# Patient Record
Sex: MALE | Race: WHITE | NOT HISPANIC OR LATINO | Employment: UNEMPLOYED | URBAN - METROPOLITAN AREA
[De-identification: names, ages, dates, MRNs, and addresses within clinical notes are randomized per-mention and may not be internally consistent; named-entity substitution may affect disease eponyms.]

---

## 2020-12-16 ENCOUNTER — APPOINTMENT (OUTPATIENT)
Dept: CT IMAGING | Facility: CLINIC | Age: 60
End: 2020-12-16
Attending: NURSE PRACTITIONER
Payer: COMMERCIAL

## 2020-12-16 ENCOUNTER — APPOINTMENT (OUTPATIENT)
Dept: ULTRASOUND IMAGING | Facility: CLINIC | Age: 60
End: 2020-12-16
Attending: NURSE PRACTITIONER
Payer: COMMERCIAL

## 2020-12-16 ENCOUNTER — HOSPITAL ENCOUNTER (OUTPATIENT)
Facility: CLINIC | Age: 60
Setting detail: OBSERVATION
Discharge: HOME | End: 2020-12-18
Attending: NURSE PRACTITIONER | Admitting: INTERNAL MEDICINE
Payer: COMMERCIAL

## 2020-12-16 DIAGNOSIS — Z20.828 EXPOSURE TO SARS-ASSOCIATED CORONAVIRUS: ICD-10-CM

## 2020-12-16 DIAGNOSIS — G89.18 POSTOPERATIVE PAIN: Primary | ICD-10-CM

## 2020-12-16 DIAGNOSIS — K81.0 ACUTE CHOLECYSTITIS: ICD-10-CM

## 2020-12-16 LAB
ALBUMIN SERPL-MCNC: 3.6 G/DL (ref 3.4–5)
ALBUMIN UR-MCNC: NEGATIVE MG/DL
ALP SERPL-CCNC: 83 U/L (ref 40–150)
ALT SERPL W P-5'-P-CCNC: 21 U/L (ref 0–70)
ANION GAP SERPL CALCULATED.3IONS-SCNC: 3 MMOL/L (ref 3–14)
APPEARANCE UR: CLEAR
AST SERPL W P-5'-P-CCNC: 15 U/L (ref 0–45)
BASOPHILS # BLD AUTO: 0.1 10E9/L (ref 0–0.2)
BASOPHILS NFR BLD AUTO: 0.6 %
BILIRUB SERPL-MCNC: 0.6 MG/DL (ref 0.2–1.3)
BILIRUB UR QL STRIP: NEGATIVE
BUN SERPL-MCNC: 10 MG/DL (ref 7–30)
CALCIUM SERPL-MCNC: 8.7 MG/DL (ref 8.5–10.1)
CHLORIDE SERPL-SCNC: 105 MMOL/L (ref 94–109)
CO2 SERPL-SCNC: 27 MMOL/L (ref 20–32)
COLOR UR AUTO: YELLOW
CREAT SERPL-MCNC: 0.96 MG/DL (ref 0.66–1.25)
DIFFERENTIAL METHOD BLD: ABNORMAL
EOSINOPHIL # BLD AUTO: 0.1 10E9/L (ref 0–0.7)
EOSINOPHIL NFR BLD AUTO: 0.9 %
ERYTHROCYTE [DISTWIDTH] IN BLOOD BY AUTOMATED COUNT: 11.7 % (ref 10–15)
GFR SERPL CREATININE-BSD FRML MDRD: 86 ML/MIN/{1.73_M2}
GLUCOSE SERPL-MCNC: 104 MG/DL (ref 70–99)
GLUCOSE UR STRIP-MCNC: NEGATIVE MG/DL
HCT VFR BLD AUTO: 48 % (ref 40–53)
HGB BLD-MCNC: 16.3 G/DL (ref 13.3–17.7)
HGB UR QL STRIP: ABNORMAL
IMM GRANULOCYTES # BLD: 0.1 10E9/L (ref 0–0.4)
IMM GRANULOCYTES NFR BLD: 0.4 %
KETONES UR STRIP-MCNC: NEGATIVE MG/DL
LEUKOCYTE ESTERASE UR QL STRIP: NEGATIVE
LIPASE SERPL-CCNC: 53 U/L (ref 73–393)
LYMPHOCYTES # BLD AUTO: 2 10E9/L (ref 0.8–5.3)
LYMPHOCYTES NFR BLD AUTO: 14.3 %
MCH RBC QN AUTO: 31.9 PG (ref 26.5–33)
MCHC RBC AUTO-ENTMCNC: 34 G/DL (ref 31.5–36.5)
MCV RBC AUTO: 94 FL (ref 78–100)
MONOCYTES # BLD AUTO: 1.3 10E9/L (ref 0–1.3)
MONOCYTES NFR BLD AUTO: 9.1 %
MUCOUS THREADS #/AREA URNS LPF: PRESENT /LPF
NEUTROPHILS # BLD AUTO: 10.4 10E9/L (ref 1.6–8.3)
NEUTROPHILS NFR BLD AUTO: 74.7 %
NITRATE UR QL: NEGATIVE
NRBC # BLD AUTO: 0 10*3/UL
NRBC BLD AUTO-RTO: 0 /100
PH UR STRIP: 5 PH (ref 5–7)
PLATELET # BLD AUTO: 200 10E9/L (ref 150–450)
POTASSIUM SERPL-SCNC: 3.5 MMOL/L (ref 3.4–5.3)
PROT SERPL-MCNC: 7.5 G/DL (ref 6.8–8.8)
RBC # BLD AUTO: 5.11 10E12/L (ref 4.4–5.9)
RBC #/AREA URNS AUTO: 1 /HPF (ref 0–2)
SODIUM SERPL-SCNC: 135 MMOL/L (ref 133–144)
SOURCE: ABNORMAL
SP GR UR STRIP: 1.01 (ref 1–1.03)
TROPONIN I SERPL-MCNC: <0.015 UG/L (ref 0–0.04)
UROBILINOGEN UR STRIP-MCNC: 0 MG/DL (ref 0–2)
WBC # BLD AUTO: 13.9 10E9/L (ref 4–11)
WBC #/AREA URNS AUTO: 2 /HPF (ref 0–5)

## 2020-12-16 PROCEDURE — 74177 CT ABD & PELVIS W/CONTRAST: CPT

## 2020-12-16 PROCEDURE — 83690 ASSAY OF LIPASE: CPT | Performed by: NURSE PRACTITIONER

## 2020-12-16 PROCEDURE — 85025 COMPLETE CBC W/AUTO DIFF WBC: CPT | Performed by: NURSE PRACTITIONER

## 2020-12-16 PROCEDURE — 96376 TX/PRO/DX INJ SAME DRUG ADON: CPT | Performed by: EMERGENCY MEDICINE

## 2020-12-16 PROCEDURE — U0003 INFECTIOUS AGENT DETECTION BY NUCLEIC ACID (DNA OR RNA); SEVERE ACUTE RESPIRATORY SYNDROME CORONAVIRUS 2 (SARS-COV-2) (CORONAVIRUS DISEASE [COVID-19]), AMPLIFIED PROBE TECHNIQUE, MAKING USE OF HIGH THROUGHPUT TECHNOLOGIES AS DESCRIBED BY CMS-2020-01-R: HCPCS | Performed by: NURSE PRACTITIONER

## 2020-12-16 PROCEDURE — 99285 EMERGENCY DEPT VISIT HI MDM: CPT | Mod: 25 | Performed by: EMERGENCY MEDICINE

## 2020-12-16 PROCEDURE — 84484 ASSAY OF TROPONIN QUANT: CPT | Performed by: NURSE PRACTITIONER

## 2020-12-16 PROCEDURE — 76705 ECHO EXAM OF ABDOMEN: CPT

## 2020-12-16 PROCEDURE — 250N000013 HC RX MED GY IP 250 OP 250 PS 637: Performed by: INTERNAL MEDICINE

## 2020-12-16 PROCEDURE — 250N000011 HC RX IP 250 OP 636: Performed by: NURSE PRACTITIONER

## 2020-12-16 PROCEDURE — G0378 HOSPITAL OBSERVATION PER HR: HCPCS

## 2020-12-16 PROCEDURE — 99219 PR INITIAL OBSERVATION CARE,LEVEL II: CPT | Performed by: INTERNAL MEDICINE

## 2020-12-16 PROCEDURE — 87086 URINE CULTURE/COLONY COUNT: CPT | Performed by: NURSE PRACTITIONER

## 2020-12-16 PROCEDURE — 96365 THER/PROPH/DIAG IV INF INIT: CPT | Mod: 59 | Performed by: EMERGENCY MEDICINE

## 2020-12-16 PROCEDURE — 93010 ELECTROCARDIOGRAM REPORT: CPT | Performed by: EMERGENCY MEDICINE

## 2020-12-16 PROCEDURE — 93005 ELECTROCARDIOGRAM TRACING: CPT | Performed by: EMERGENCY MEDICINE

## 2020-12-16 PROCEDURE — 81001 URINALYSIS AUTO W/SCOPE: CPT | Performed by: NURSE PRACTITIONER

## 2020-12-16 PROCEDURE — 96376 TX/PRO/DX INJ SAME DRUG ADON: CPT

## 2020-12-16 PROCEDURE — 80053 COMPREHEN METABOLIC PANEL: CPT | Performed by: NURSE PRACTITIONER

## 2020-12-16 PROCEDURE — 258N000003 HC RX IP 258 OP 636: Performed by: NURSE PRACTITIONER

## 2020-12-16 PROCEDURE — 258N000003 HC RX IP 258 OP 636: Performed by: INTERNAL MEDICINE

## 2020-12-16 PROCEDURE — 96375 TX/PRO/DX INJ NEW DRUG ADDON: CPT | Performed by: EMERGENCY MEDICINE

## 2020-12-16 PROCEDURE — C9803 HOPD COVID-19 SPEC COLLECT: HCPCS | Performed by: EMERGENCY MEDICINE

## 2020-12-16 PROCEDURE — 250N000011 HC RX IP 250 OP 636: Performed by: INTERNAL MEDICINE

## 2020-12-16 RX ORDER — PROCHLORPERAZINE 25 MG
25 SUPPOSITORY, RECTAL RECTAL EVERY 12 HOURS PRN
Status: DISCONTINUED | OUTPATIENT
Start: 2020-12-16 | End: 2020-12-18 | Stop reason: HOSPADM

## 2020-12-16 RX ORDER — SODIUM CHLORIDE 9 MG/ML
INJECTION, SOLUTION INTRAVENOUS CONTINUOUS
Status: DISCONTINUED | OUTPATIENT
Start: 2020-12-16 | End: 2020-12-18 | Stop reason: HOSPADM

## 2020-12-16 RX ORDER — SODIUM CHLORIDE, SODIUM LACTATE, POTASSIUM CHLORIDE, CALCIUM CHLORIDE 600; 310; 30; 20 MG/100ML; MG/100ML; MG/100ML; MG/100ML
INJECTION, SOLUTION INTRAVENOUS CONTINUOUS
Status: DISCONTINUED | OUTPATIENT
Start: 2020-12-16 | End: 2020-12-18 | Stop reason: HOSPADM

## 2020-12-16 RX ORDER — POLYETHYLENE GLYCOL 3350 17 G/17G
17 POWDER, FOR SOLUTION ORAL DAILY
Status: DISCONTINUED | OUTPATIENT
Start: 2020-12-17 | End: 2020-12-18 | Stop reason: HOSPADM

## 2020-12-16 RX ORDER — HYDROCODONE BITARTRATE AND ACETAMINOPHEN 5; 325 MG/1; MG/1
1-2 TABLET ORAL EVERY 4 HOURS PRN
Status: DISCONTINUED | OUTPATIENT
Start: 2020-12-16 | End: 2020-12-18 | Stop reason: HOSPADM

## 2020-12-16 RX ORDER — ONDANSETRON 4 MG/1
4 TABLET, ORALLY DISINTEGRATING ORAL EVERY 6 HOURS PRN
Status: DISCONTINUED | OUTPATIENT
Start: 2020-12-16 | End: 2020-12-16

## 2020-12-16 RX ORDER — HYDROMORPHONE HYDROCHLORIDE 1 MG/ML
0.2 INJECTION, SOLUTION INTRAMUSCULAR; INTRAVENOUS; SUBCUTANEOUS
Status: DISCONTINUED | OUTPATIENT
Start: 2020-12-16 | End: 2020-12-18 | Stop reason: HOSPADM

## 2020-12-16 RX ORDER — AMOXICILLIN 250 MG
2 CAPSULE ORAL 2 TIMES DAILY
Status: DISCONTINUED | OUTPATIENT
Start: 2020-12-16 | End: 2020-12-18 | Stop reason: HOSPADM

## 2020-12-16 RX ORDER — ONDANSETRON 2 MG/ML
4 INJECTION INTRAMUSCULAR; INTRAVENOUS EVERY 6 HOURS PRN
Status: DISCONTINUED | OUTPATIENT
Start: 2020-12-16 | End: 2020-12-16

## 2020-12-16 RX ORDER — LORAZEPAM 0.5 MG/1
0.5 TABLET ORAL EVERY 4 HOURS PRN
Status: DISCONTINUED | OUTPATIENT
Start: 2020-12-16 | End: 2020-12-18 | Stop reason: HOSPADM

## 2020-12-16 RX ORDER — ACETAMINOPHEN 325 MG/1
650 TABLET ORAL EVERY 4 HOURS PRN
Status: DISCONTINUED | OUTPATIENT
Start: 2020-12-16 | End: 2020-12-18 | Stop reason: HOSPADM

## 2020-12-16 RX ORDER — ONDANSETRON 4 MG/1
4 TABLET, ORALLY DISINTEGRATING ORAL EVERY 6 HOURS PRN
Status: DISCONTINUED | OUTPATIENT
Start: 2020-12-16 | End: 2020-12-18 | Stop reason: HOSPADM

## 2020-12-16 RX ORDER — HYDROMORPHONE HYDROCHLORIDE 1 MG/ML
0.5 INJECTION, SOLUTION INTRAMUSCULAR; INTRAVENOUS; SUBCUTANEOUS
Status: COMPLETED | OUTPATIENT
Start: 2020-12-16 | End: 2020-12-16

## 2020-12-16 RX ORDER — PROCHLORPERAZINE MALEATE 5 MG
10 TABLET ORAL EVERY 6 HOURS PRN
Status: DISCONTINUED | OUTPATIENT
Start: 2020-12-16 | End: 2020-12-18 | Stop reason: HOSPADM

## 2020-12-16 RX ORDER — NALOXONE HYDROCHLORIDE 0.4 MG/ML
0.4 INJECTION, SOLUTION INTRAMUSCULAR; INTRAVENOUS; SUBCUTANEOUS
Status: DISCONTINUED | OUTPATIENT
Start: 2020-12-16 | End: 2020-12-18 | Stop reason: HOSPADM

## 2020-12-16 RX ORDER — IOPAMIDOL 755 MG/ML
79 INJECTION, SOLUTION INTRAVASCULAR ONCE
Status: COMPLETED | OUTPATIENT
Start: 2020-12-16 | End: 2020-12-16

## 2020-12-16 RX ORDER — NALOXONE HYDROCHLORIDE 0.4 MG/ML
0.2 INJECTION, SOLUTION INTRAMUSCULAR; INTRAVENOUS; SUBCUTANEOUS
Status: DISCONTINUED | OUTPATIENT
Start: 2020-12-16 | End: 2020-12-18 | Stop reason: HOSPADM

## 2020-12-16 RX ORDER — ONDANSETRON 2 MG/ML
4 INJECTION INTRAMUSCULAR; INTRAVENOUS EVERY 6 HOURS PRN
Status: DISCONTINUED | OUTPATIENT
Start: 2020-12-16 | End: 2020-12-18 | Stop reason: HOSPADM

## 2020-12-16 RX ORDER — KETOROLAC TROMETHAMINE 15 MG/ML
15 INJECTION, SOLUTION INTRAMUSCULAR; INTRAVENOUS ONCE
Status: COMPLETED | OUTPATIENT
Start: 2020-12-16 | End: 2020-12-16

## 2020-12-16 RX ORDER — ACETAMINOPHEN 650 MG/1
650 SUPPOSITORY RECTAL EVERY 4 HOURS PRN
Status: DISCONTINUED | OUTPATIENT
Start: 2020-12-16 | End: 2020-12-18 | Stop reason: HOSPADM

## 2020-12-16 RX ORDER — ONDANSETRON 2 MG/ML
4 INJECTION INTRAMUSCULAR; INTRAVENOUS EVERY 30 MIN PRN
Status: DISCONTINUED | OUTPATIENT
Start: 2020-12-16 | End: 2020-12-16

## 2020-12-16 RX ORDER — AMOXICILLIN 250 MG
1 CAPSULE ORAL 2 TIMES DAILY
Status: DISCONTINUED | OUTPATIENT
Start: 2020-12-16 | End: 2020-12-18 | Stop reason: HOSPADM

## 2020-12-16 RX ORDER — LORAZEPAM 2 MG/ML
0.5 INJECTION INTRAMUSCULAR EVERY 4 HOURS PRN
Status: DISCONTINUED | OUTPATIENT
Start: 2020-12-16 | End: 2020-12-18 | Stop reason: HOSPADM

## 2020-12-16 RX ADMIN — IOPAMIDOL 79 ML: 755 INJECTION, SOLUTION INTRAVENOUS at 16:39

## 2020-12-16 RX ADMIN — HYDROMORPHONE HYDROCHLORIDE 0.5 MG: 1 INJECTION, SOLUTION INTRAMUSCULAR; INTRAVENOUS; SUBCUTANEOUS at 17:34

## 2020-12-16 RX ADMIN — ONDANSETRON 4 MG: 2 INJECTION INTRAMUSCULAR; INTRAVENOUS at 14:43

## 2020-12-16 RX ADMIN — HYDROCODONE BITARTRATE AND ACETAMINOPHEN 1 TABLET: 5; 325 TABLET ORAL at 21:39

## 2020-12-16 RX ADMIN — KETOROLAC TROMETHAMINE 15 MG: 15 INJECTION, SOLUTION INTRAMUSCULAR; INTRAVENOUS at 14:45

## 2020-12-16 RX ADMIN — SODIUM CHLORIDE, POTASSIUM CHLORIDE, SODIUM LACTATE AND CALCIUM CHLORIDE 1000 ML: 600; 310; 30; 20 INJECTION, SOLUTION INTRAVENOUS at 20:55

## 2020-12-16 RX ADMIN — HYDROMORPHONE HYDROCHLORIDE 0.5 MG: 1 INJECTION, SOLUTION INTRAMUSCULAR; INTRAVENOUS; SUBCUTANEOUS at 19:47

## 2020-12-16 RX ADMIN — SODIUM CHLORIDE: 9 INJECTION, SOLUTION INTRAVENOUS at 21:41

## 2020-12-16 RX ADMIN — SODIUM CHLORIDE, POTASSIUM CHLORIDE, SODIUM LACTATE AND CALCIUM CHLORIDE 1000 ML: 600; 310; 30; 20 INJECTION, SOLUTION INTRAVENOUS at 18:00

## 2020-12-16 RX ADMIN — HYDROMORPHONE HYDROCHLORIDE 0.5 MG: 1 INJECTION, SOLUTION INTRAMUSCULAR; INTRAVENOUS; SUBCUTANEOUS at 16:59

## 2020-12-16 RX ADMIN — TAZOBACTAM SODIUM AND PIPERACILLIN SODIUM 3.38 G: 375; 3 INJECTION, SOLUTION INTRAVENOUS at 17:49

## 2020-12-16 RX ADMIN — TAZOBACTAM SODIUM AND PIPERACILLIN SODIUM 3.38 G: 375; 3 INJECTION, SOLUTION INTRAVENOUS at 23:19

## 2020-12-16 ASSESSMENT — MIFFLIN-ST. JEOR
SCORE: 1499.5
SCORE: 1492.58

## 2020-12-16 NOTE — ED PROVIDER NOTES
History     Chief Complaint   Patient presents with     Abdominal Pain     c/o mid abdominal pain that radiates to his back     HPI  Iam Villanueva is a 60 year old male with past with past medical history of tobacco use who presents to the emergency department with acute onset of right and left sided upper abdominal pain last evening at 11 PM with no previous history of similar pain.  Patient reports the abdominal pain started off as sharp and steady and intermittent and rated the pain a 6 out of 10.  Patient states as the night went on the pain has become constant and severe with his sharpness and rates the pain a 9 out of 10.    Patient states that he has one episode of vomiting last evening but otherwise specifically denies diaphoresis, fevers, aches, chills, dysuria, hematuria, bright red rectal bleeding, diarrhea, constipation chest pain, shortness of air, difficulty breathing, mental confusion, dizziness, lightheadedness.  Patient reports that he tried MiraLAX this morning at 11 AM as he had a mild sensation of rectal urgency with no results.  Patient denies history of ulcerative colitis, cholecystitis, liver disease, kidney disease, Crohn's disease, diverticulitis.  Patient reports his last colonoscopy was 3 years ago and remarkable for some polyps.  Patient reports occasional to rare alcohol use and denies recreational drug use.    Patient denies ear pain, eye pain, throat pain, speech difficulty, left or right-sided body weakness, mental confusion, thoughts of harming self.    Allergies:  No Known Allergies    Problem List:    Patient Active Problem List    Diagnosis Date Noted     Acute cholecystitis 12/16/2020     Priority: Medium        Past Medical History:    No past medical history on file.    Past Surgical History:    No past surgical history on file.    Family History:    No family history on file.    Social History:  Marital Status:   [2]  Social History     Tobacco Use     Smoking  "status: Not on file   Substance Use Topics     Alcohol use: Not on file     Drug use: Not on file        Medications:    No current outpatient medications on file.      Review of Systems  As mentioned above in the history present illness. All other systems were reviewed and are negative.    Physical Exam   BP: (!) 157/85  Pulse: 75  Temp: 98.1  F (36.7  C)  Resp: 16  Height: 167.6 cm (5' 6\")  Weight: 73.5 kg (162 lb)  SpO2: 98 %      Physical Exam  Vitals signs and nursing note reviewed.   Constitutional:       General: He is in acute distress (mild distress).      Appearance: Normal appearance. He is well-developed and normal weight. He is not ill-appearing, toxic-appearing or diaphoretic.   HENT:      Head: Normocephalic and atraumatic.      Right Ear: Tympanic membrane, ear canal and external ear normal.      Left Ear: Tympanic membrane, ear canal and external ear normal.      Nose: Nose normal.      Mouth/Throat:      Pharynx: Uvula midline.   Eyes:      General:         Right eye: No discharge.         Left eye: No discharge.      Conjunctiva/sclera: Conjunctivae normal.   Neck:      Thyroid: No thyromegaly.   Cardiovascular:      Rate and Rhythm: Normal rate and regular rhythm.      Heart sounds: Normal heart sounds. No murmur. No friction rub. No gallop.    Pulmonary:      Effort: Pulmonary effort is normal.      Breath sounds: Normal breath sounds. No stridor. No wheezing.   Abdominal:      General: Abdomen is flat. Bowel sounds are normal. There is no distension.      Palpations: Abdomen is soft. There is no mass.      Tenderness: There is abdominal tenderness in the periumbilical area. There is right CVA tenderness and guarding (RUQ). There is no left CVA tenderness or rebound (LUQ). Negative signs include psoas sign.   Lymphadenopathy:      Cervical: No cervical adenopathy.   Skin:     General: Skin is warm.      Capillary Refill: Capillary refill takes less than 2 seconds.      Findings: No rash. "   Neurological:      General: No focal deficit present.      Mental Status: He is alert and oriented to person, place, and time.   Psychiatric:         Mood and Affect: Mood normal.         Behavior: Behavior is cooperative.         ED Course     ED Course as of Dec 16 2053   Wed Dec 16, 2020   1532 Reassessed patient.  Reviewed white blood cell count, troponin, EKG, comprehensive metabolic panel.  Urinalysis obtained and discussed if urinalysis reveals blood I would recommend a CT without contrast but if it does not have blood would recommend a CT with contrast.  Patient verbalized understanding.  Explained the differentials.  Patient states his pain level is currently 5 out of 10.      1945 Phone call to surgeon, Dr. Doss, reviewed case with him and requested surgical consultation.  He agreed that gallbladder needs to be removed.  He request overnight admission with antibiotics and pain control with surgery in the morning.  Hospitalist paged        Procedures       EKG Interpretation:      EKG Number: 1  Interpreted by Gisela Gao, BRIAN CNP, Norberto Rosenberg MD  Symptoms at time of EKG: abdominal pain, mid upper abdomen   Rhythm: Normal sinus   Rate: Normal  Axis: Normal  Ectopy: None  Conduction: Normal and Left anterior fasciclar block  ST Segments/ T Waves: No ST-T wave changes, No acute ischemic changes and Poor R wave progression  Q Waves: None  Comparison to prior: No old EKG available    Clinical Impression: no acute changes and non-specific EKG      Results for orders placed or performed during the hospital encounter of 12/16/20 (from the past 24 hour(s))   CBC with platelets differential   Result Value Ref Range    WBC 13.9 (H) 4.0 - 11.0 10e9/L    RBC Count 5.11 4.4 - 5.9 10e12/L    Hemoglobin 16.3 13.3 - 17.7 g/dL    Hematocrit 48.0 40.0 - 53.0 %    MCV 94 78 - 100 fl    MCH 31.9 26.5 - 33.0 pg    MCHC 34.0 31.5 - 36.5 g/dL    RDW 11.7 10.0 - 15.0 %    Platelet Count 200 150 - 450 10e9/L     Diff Method Automated Method     % Neutrophils 74.7 %    % Lymphocytes 14.3 %    % Monocytes 9.1 %    % Eosinophils 0.9 %    % Basophils 0.6 %    % Immature Granulocytes 0.4 %    Nucleated RBCs 0 0 /100    Absolute Neutrophil 10.4 (H) 1.6 - 8.3 10e9/L    Absolute Lymphocytes 2.0 0.8 - 5.3 10e9/L    Absolute Monocytes 1.3 0.0 - 1.3 10e9/L    Absolute Eosinophils 0.1 0.0 - 0.7 10e9/L    Absolute Basophils 0.1 0.0 - 0.2 10e9/L    Abs Immature Granulocytes 0.1 0 - 0.4 10e9/L    Absolute Nucleated RBC 0.0    Comprehensive metabolic panel   Result Value Ref Range    Sodium 135 133 - 144 mmol/L    Potassium 3.5 3.4 - 5.3 mmol/L    Chloride 105 94 - 109 mmol/L    Carbon Dioxide 27 20 - 32 mmol/L    Anion Gap 3 3 - 14 mmol/L    Glucose 104 (H) 70 - 99 mg/dL    Urea Nitrogen 10 7 - 30 mg/dL    Creatinine 0.96 0.66 - 1.25 mg/dL    GFR Estimate 86 >60 mL/min/[1.73_m2]    GFR Estimate If Black >90 >60 mL/min/[1.73_m2]    Calcium 8.7 8.5 - 10.1 mg/dL    Bilirubin Total 0.6 0.2 - 1.3 mg/dL    Albumin 3.6 3.4 - 5.0 g/dL    Protein Total 7.5 6.8 - 8.8 g/dL    Alkaline Phosphatase 83 40 - 150 U/L    ALT 21 0 - 70 U/L    AST 15 0 - 45 U/L   Troponin I   Result Value Ref Range    Troponin I ES <0.015 0.000 - 0.045 ug/L   Lipase   Result Value Ref Range    Lipase 53 (L) 73 - 393 U/L   UA reflex to Microscopic   Result Value Ref Range    Color Urine Yellow     Appearance Urine Clear     Glucose Urine Negative NEG^Negative mg/dL    Bilirubin Urine Negative NEG^Negative    Ketones Urine Negative NEG^Negative mg/dL    Specific Gravity Urine 1.015 1.003 - 1.035    Blood Urine Small (A) NEG^Negative    pH Urine 5.0 5.0 - 7.0 pH    Protein Albumin Urine Negative NEG^Negative mg/dL    Urobilinogen mg/dL 0.0 0.0 - 2.0 mg/dL    Nitrite Urine Negative NEG^Negative    Leukocyte Esterase Urine Negative NEG^Negative    Source Midstream Urine     RBC Urine 1 0 - 2 /HPF    WBC Urine 2 0 - 5 /HPF    Mucous Urine Present (A) NEG^Negative /LPF   CT  Abdomen Pelvis w Contrast    Narrative    CT ABDOMEN AND PELVIS WITH CONTRAST 12/16/2020 4:48 PM    CLINICAL HISTORY: Abdominal pain.    TECHNIQUE: CT scan of the abdomen and pelvis was performed following  injection of IV contrast. Multiplanar reformats were obtained. Dose  reduction techniques were used.    CONTRAST: 79 mL Isovue 370.    COMPARISON: None.    FINDINGS:   LOWER CHEST: Mild scarring and/or atelectasis at both lung bases  posteriorly. The visualized lung bases are otherwise clear. Small  hiatal hernia.    HEPATOBILIARY: There are multiple gallstones within the gallbladder.  The gallbladder appears distended, and the gallbladder wall is likely  thickened. There is mild pericholecystic inflammatory stranding.  Findings are suspicious for acute cholecystitis. The liver is  unremarkable.    PANCREAS: Normal.    SPLEEN: Normal.    ADRENAL GLANDS: Normal.    KIDNEYS/BLADDER: Unremarkable. No hydronephrosis.    BOWEL: Colonic diverticulosis. No convincing evidence for  diverticulitis. No bowel obstruction. Unremarkable appendix.    PELVIC ORGANS: Mild prostatic enlargement and central calcification.    LYMPH NODES: No enlarged lymph nodes are identified in the abdomen or  pelvis.    VASCULATURE: Mild atherosclerotic aortoiliac calcification.    ADDITIONAL FINDINGS: None.    MUSCULOSKELETAL: Unremarkable.      Impression    IMPRESSION:   1.  Gallbladder findings are suspicious for acute cholecystitis.  Consider right upper quadrant ultrasound for further characterization.  2.  Colonic diverticulosis, without convincing evidence for  diverticulitis.    MANUEL TELLEZ MD   US Abdomen Limited    Narrative    US ABDOMEN LIMITED 12/16/2020 7:26 PM    CLINICAL HISTORY: Abdominal pain. Abnormal gallbladder on CT.    TECHNIQUE: Limited abdominal ultrasound.    COMPARISON: CT of the abdomen and pelvis performed 12/16/2020.    FINDINGS:    GALLBLADDER: Stones and sludge are noted within the gallbladder.  The  gallbladder wall is mildly thickened at 0.3 cm. No pericholecystic  fluid is identified. The sonographer reports a positive sonographic  Alonso's sign. Findings are suspicious for acute cholecystitis.    BILE DUCTS: There is no biliary dilatation. The common bile duct  measures 6 mm. The entire common duct could not be visualized due to  overlying bowel gas.    LIVER: Unremarkable. No evidence for fatty infiltration of the liver.  No focal hepatic masses.    RIGHT KIDNEY: Unremarkable. No hydronephrosis.    PANCREAS: The visualized portions of the pancreas are normal.    No ascites.      Impression    IMPRESSION: Findings are suspicious for acute cholecystitis.    MANUEL TELLEZ MD       Medications   ondansetron (ZOFRAN) injection 4 mg (4 mg Intravenous Given 12/16/20 1443)   sodium chloride 0.9 % bag 500mL for CT scan flush use (has no administration in time range)   piperacillin-tazobactam (ZOSYN) infusion 3.375 g (0 g Intravenous Not Given 12/16/20 2037)   lactated ringers BOLUS 1,000 mL (has no administration in time range)     Followed by   lactated ringers BOLUS 1,000 mL (has no administration in time range)     Followed by   lactated ringers infusion (has no administration in time range)   ketorolac (TORADOL) injection 15 mg (15 mg Intravenous Given 12/16/20 1445)   iopamidol (ISOVUE-370) solution 79 mL (79 mLs Intravenous Given 12/16/20 1639)   HYDROmorphone (PF) (DILAUDID) injection 0.5 mg (0.5 mg Intravenous Given 12/16/20 1947)   piperacillin-tazobactam (ZOSYN) infusion 3.375 g (0 g Intravenous Stopped 12/16/20 1918)       Assessments & Plan (with Medical Decision Making)  Iam Villanueva is a 60 year old male with past with past medical history of tobacco use who presents to the emergency department with acute onset of right and left sided upper abdominal pain last evening at 11 PM with no previous history of similar pain without fevers, diarrhea and no history of ulcerative colitis,  diverticulitis, previous abdominal surgeries, liver disease or renal disease.  Patient on exam appears severely uncomfortable, and has pain in his right upper quadrant and right CVA there is no rebound or guarding noted.  Differential to include ureterolithiasis, pyelonephritis, infected ureteral lithiasis, cholecystitis with or without common bile duct obstruction pancreatitis with common bile duct obstruction, STEMI, NSTEMI.  CBC completed reveals slightly elevated white blood cell count and comprehensive metabolic panel completed and is unremarkable.  Lipase obtained and is no sign of pancreatitis.  Urinalysis obtained and has small blood noted but otherwise unremarkable.  CT with contrast obtained and reveals suspicion for acute cholecystitis.  Ultrasound completed and confirms cholecystitis.  Intervally patient given Zofran 3.375 g.  Surgical consultation completed and Dr. Doss recommends observation admission plans to complete surgery tomorrow after patient has had a few doses of IV antibiotics.  Dr. Ren hospitalist agrees to observation admission.  Orders placed.  Collaborative physician in care of this patient is Dr. Fredy Lezama.     I have reviewed the nursing notes.    I have reviewed the findings, diagnosis, plan and need for follow up with the patient.    New Prescriptions    No medications on file       Final diagnoses:   Acute cholecystitis       12/16/2020   [facility] EMERGENCY DEPT     Gisela Gao, APRN CNP  12/16/20 2053

## 2020-12-16 NOTE — ED NOTES
Spoke with daughter: Pt doctors at [facility]. Decreased blood flow to abdominal aorta and iliac arteries.     Daughter Padmaja - [phone] waiting in the car, she has his cell phone.

## 2020-12-17 ENCOUNTER — APPOINTMENT (OUTPATIENT)
Dept: GENERAL RADIOLOGY | Facility: CLINIC | Age: 60
End: 2020-12-17
Attending: FAMILY MEDICINE
Payer: COMMERCIAL

## 2020-12-17 ENCOUNTER — ANESTHESIA (OUTPATIENT)
Dept: SURGERY | Facility: CLINIC | Age: 60
End: 2020-12-17
Payer: COMMERCIAL

## 2020-12-17 ENCOUNTER — ANESTHESIA EVENT (OUTPATIENT)
Dept: SURGERY | Facility: CLINIC | Age: 60
End: 2020-12-17
Payer: COMMERCIAL

## 2020-12-17 LAB
BACTERIA SPEC CULT: NO GROWTH
BASOPHILS # BLD AUTO: 0.1 10E9/L (ref 0–0.2)
BASOPHILS NFR BLD AUTO: 0.6 %
DIFFERENTIAL METHOD BLD: NORMAL
EOSINOPHIL # BLD AUTO: 0.1 10E9/L (ref 0–0.7)
EOSINOPHIL NFR BLD AUTO: 0.9 %
ERYTHROCYTE [DISTWIDTH] IN BLOOD BY AUTOMATED COUNT: 11.8 % (ref 10–15)
HCT VFR BLD AUTO: 45.1 % (ref 40–53)
HGB BLD-MCNC: 15.5 G/DL (ref 13.3–17.7)
IMM GRANULOCYTES # BLD: 0.1 10E9/L (ref 0–0.4)
IMM GRANULOCYTES NFR BLD: 0.8 %
LABORATORY COMMENT REPORT: NORMAL
LYMPHOCYTES # BLD AUTO: 1.1 10E9/L (ref 0.8–5.3)
LYMPHOCYTES NFR BLD AUTO: 10.8 %
Lab: NORMAL
MCH RBC QN AUTO: 32.3 PG (ref 26.5–33)
MCHC RBC AUTO-ENTMCNC: 34.4 G/DL (ref 31.5–36.5)
MCV RBC AUTO: 94 FL (ref 78–100)
MONOCYTES # BLD AUTO: 1.1 10E9/L (ref 0–1.3)
MONOCYTES NFR BLD AUTO: 10.5 %
NEUTROPHILS # BLD AUTO: 8.1 10E9/L (ref 1.6–8.3)
NEUTROPHILS NFR BLD AUTO: 76.4 %
NRBC # BLD AUTO: 0 10*3/UL
NRBC BLD AUTO-RTO: 0 /100
PLATELET # BLD AUTO: 171 10E9/L (ref 150–450)
RBC # BLD AUTO: 4.8 10E12/L (ref 4.4–5.9)
SARS-COV-2 RNA SPEC QL NAA+PROBE: NEGATIVE
SARS-COV-2 RNA SPEC QL NAA+PROBE: NORMAL
SPECIMEN SOURCE: NORMAL
WBC # BLD AUTO: 10.5 10E9/L (ref 4–11)

## 2020-12-17 PROCEDURE — 250N000013 HC RX MED GY IP 250 OP 250 PS 637: Performed by: INTERNAL MEDICINE

## 2020-12-17 PROCEDURE — 250N000011 HC RX IP 250 OP 636: Performed by: NURSE ANESTHETIST, CERTIFIED REGISTERED

## 2020-12-17 PROCEDURE — 271N000001 HC OR GENERAL SUPPLY NON-STERILE: Performed by: SURGERY

## 2020-12-17 PROCEDURE — 258N000003 HC RX IP 258 OP 636: Performed by: NURSE ANESTHETIST, CERTIFIED REGISTERED

## 2020-12-17 PROCEDURE — 272N000001 HC OR GENERAL SUPPLY STERILE: Performed by: SURGERY

## 2020-12-17 PROCEDURE — 250N000011 HC RX IP 250 OP 636: Performed by: INTERNAL MEDICINE

## 2020-12-17 PROCEDURE — 36415 COLL VENOUS BLD VENIPUNCTURE: CPT | Performed by: NURSE PRACTITIONER

## 2020-12-17 PROCEDURE — 370N000002 HC ANESTHESIA TECHNICAL FEE, EACH ADDTL 15 MIN: Performed by: SURGERY

## 2020-12-17 PROCEDURE — 250N000009 HC RX 250: Performed by: SURGERY

## 2020-12-17 PROCEDURE — 360N000020 HC SURGERY LEVEL 3 1ST 30 MIN: Performed by: SURGERY

## 2020-12-17 PROCEDURE — 47562 LAPAROSCOPIC CHOLECYSTECTOMY: CPT | Performed by: SURGERY

## 2020-12-17 PROCEDURE — 250N000011 HC RX IP 250 OP 636: Performed by: SURGERY

## 2020-12-17 PROCEDURE — 370N000001 HC ANESTHESIA TECHNICAL FEE, 1ST 30 MIN: Performed by: SURGERY

## 2020-12-17 PROCEDURE — 761N000001 HC RECOVERY PHASE 1 LEVEL 1 FIRST HR: Performed by: SURGERY

## 2020-12-17 PROCEDURE — 99226 PR SUBSEQUENT OBSERVATION CARE,LEVEL III: CPT | Performed by: FAMILY MEDICINE

## 2020-12-17 PROCEDURE — 47562 LAPAROSCOPIC CHOLECYSTECTOMY: CPT | Mod: AS | Performed by: PHYSICIAN ASSISTANT

## 2020-12-17 PROCEDURE — 258N000003 HC RX IP 258 OP 636: Performed by: SURGERY

## 2020-12-17 PROCEDURE — 96375 TX/PRO/DX INJ NEW DRUG ADDON: CPT

## 2020-12-17 PROCEDURE — 250N000003 HC SEVOFLURANE, EA 15 MIN: Performed by: SURGERY

## 2020-12-17 PROCEDURE — 258N000001 HC RX 258: Performed by: SURGERY

## 2020-12-17 PROCEDURE — 999N000136 HC STATISTIC PRE PROC ASSESS II: Performed by: SURGERY

## 2020-12-17 PROCEDURE — 360N000021 HC SURGERY LEVEL 3 EA 15 ADDTL MIN: Performed by: SURGERY

## 2020-12-17 PROCEDURE — 250N000011 HC RX IP 250 OP 636: Performed by: NURSE PRACTITIONER

## 2020-12-17 PROCEDURE — 272N000004 HC RX 272: Performed by: SURGERY

## 2020-12-17 PROCEDURE — 250N000009 HC RX 250: Performed by: NURSE ANESTHETIST, CERTIFIED REGISTERED

## 2020-12-17 PROCEDURE — 85025 COMPLETE CBC W/AUTO DIFF WBC: CPT | Performed by: NURSE PRACTITIONER

## 2020-12-17 PROCEDURE — 258N000003 HC RX IP 258 OP 636: Performed by: INTERNAL MEDICINE

## 2020-12-17 PROCEDURE — 71045 X-RAY EXAM CHEST 1 VIEW: CPT

## 2020-12-17 PROCEDURE — 88304 TISSUE EXAM BY PATHOLOGIST: CPT | Mod: TC | Performed by: SURGERY

## 2020-12-17 PROCEDURE — G0378 HOSPITAL OBSERVATION PER HR: HCPCS

## 2020-12-17 PROCEDURE — 88304 TISSUE EXAM BY PATHOLOGIST: CPT | Mod: 26 | Performed by: PATHOLOGY

## 2020-12-17 PROCEDURE — 99207 PR CDG-CODE CATEGORY CHANGED: CPT | Performed by: FAMILY MEDICINE

## 2020-12-17 PROCEDURE — 96376 TX/PRO/DX INJ SAME DRUG ADON: CPT

## 2020-12-17 RX ORDER — NALOXONE HYDROCHLORIDE 0.4 MG/ML
0.2 INJECTION, SOLUTION INTRAMUSCULAR; INTRAVENOUS; SUBCUTANEOUS
Status: ACTIVE | OUTPATIENT
Start: 2020-12-17 | End: 2020-12-18

## 2020-12-17 RX ORDER — MEPERIDINE HYDROCHLORIDE 25 MG/ML
12.5 INJECTION INTRAMUSCULAR; INTRAVENOUS; SUBCUTANEOUS EVERY 5 MIN PRN
Status: DISCONTINUED | OUTPATIENT
Start: 2020-12-17 | End: 2020-12-18 | Stop reason: HOSPADM

## 2020-12-17 RX ORDER — NALOXONE HYDROCHLORIDE 0.4 MG/ML
0.4 INJECTION, SOLUTION INTRAMUSCULAR; INTRAVENOUS; SUBCUTANEOUS
Status: ACTIVE | OUTPATIENT
Start: 2020-12-17 | End: 2020-12-18

## 2020-12-17 RX ORDER — GABAPENTIN 300 MG/1
300 CAPSULE ORAL ONCE
Status: DISCONTINUED | OUTPATIENT
Start: 2020-12-17 | End: 2020-12-17 | Stop reason: HOSPADM

## 2020-12-17 RX ORDER — KETOROLAC TROMETHAMINE 30 MG/ML
INJECTION, SOLUTION INTRAMUSCULAR; INTRAVENOUS PRN
Status: DISCONTINUED | OUTPATIENT
Start: 2020-12-17 | End: 2020-12-17

## 2020-12-17 RX ORDER — DIMENHYDRINATE 50 MG/ML
25 INJECTION, SOLUTION INTRAMUSCULAR; INTRAVENOUS
Status: DISCONTINUED | OUTPATIENT
Start: 2020-12-17 | End: 2020-12-18 | Stop reason: HOSPADM

## 2020-12-17 RX ORDER — ONDANSETRON 2 MG/ML
4 INJECTION INTRAMUSCULAR; INTRAVENOUS EVERY 30 MIN PRN
Status: DISCONTINUED | OUTPATIENT
Start: 2020-12-17 | End: 2020-12-18 | Stop reason: HOSPADM

## 2020-12-17 RX ORDER — SODIUM CHLORIDE, SODIUM LACTATE, POTASSIUM CHLORIDE, CALCIUM CHLORIDE 600; 310; 30; 20 MG/100ML; MG/100ML; MG/100ML; MG/100ML
INJECTION, SOLUTION INTRAVENOUS CONTINUOUS
Status: DISCONTINUED | OUTPATIENT
Start: 2020-12-17 | End: 2020-12-18 | Stop reason: HOSPADM

## 2020-12-17 RX ORDER — FENTANYL CITRATE 50 UG/ML
25-50 INJECTION, SOLUTION INTRAMUSCULAR; INTRAVENOUS EVERY 5 MIN PRN
Status: DISCONTINUED | OUTPATIENT
Start: 2020-12-17 | End: 2020-12-18 | Stop reason: HOSPADM

## 2020-12-17 RX ORDER — ACETAMINOPHEN 325 MG/1
975 TABLET ORAL ONCE
Status: DISCONTINUED | OUTPATIENT
Start: 2020-12-17 | End: 2020-12-17 | Stop reason: HOSPADM

## 2020-12-17 RX ORDER — ONDANSETRON 4 MG/1
4 TABLET, ORALLY DISINTEGRATING ORAL EVERY 30 MIN PRN
Status: DISCONTINUED | OUTPATIENT
Start: 2020-12-17 | End: 2020-12-18 | Stop reason: HOSPADM

## 2020-12-17 RX ORDER — LIDOCAINE HYDROCHLORIDE 10 MG/ML
INJECTION, SOLUTION INFILTRATION; PERINEURAL PRN
Status: DISCONTINUED | OUTPATIENT
Start: 2020-12-17 | End: 2020-12-17

## 2020-12-17 RX ORDER — BUPIVACAINE HYDROCHLORIDE 5 MG/ML
INJECTION, SOLUTION PERINEURAL PRN
Status: DISCONTINUED | OUTPATIENT
Start: 2020-12-17 | End: 2020-12-17 | Stop reason: HOSPADM

## 2020-12-17 RX ORDER — SODIUM CHLORIDE, SODIUM LACTATE, POTASSIUM CHLORIDE, CALCIUM CHLORIDE 600; 310; 30; 20 MG/100ML; MG/100ML; MG/100ML; MG/100ML
INJECTION, SOLUTION INTRAVENOUS CONTINUOUS
Status: DISCONTINUED | OUTPATIENT
Start: 2020-12-17 | End: 2020-12-17 | Stop reason: HOSPADM

## 2020-12-17 RX ORDER — HYDROMORPHONE HYDROCHLORIDE 1 MG/ML
.3-.5 INJECTION, SOLUTION INTRAMUSCULAR; INTRAVENOUS; SUBCUTANEOUS EVERY 5 MIN PRN
Status: DISCONTINUED | OUTPATIENT
Start: 2020-12-17 | End: 2020-12-18 | Stop reason: HOSPADM

## 2020-12-17 RX ORDER — DEXAMETHASONE SODIUM PHOSPHATE 4 MG/ML
INJECTION, SOLUTION INTRA-ARTICULAR; INTRALESIONAL; INTRAMUSCULAR; INTRAVENOUS; SOFT TISSUE PRN
Status: DISCONTINUED | OUTPATIENT
Start: 2020-12-17 | End: 2020-12-17

## 2020-12-17 RX ORDER — HYDROCODONE BITARTRATE AND ACETAMINOPHEN 5; 325 MG/1; MG/1
1-2 TABLET ORAL EVERY 4 HOURS PRN
Status: DISCONTINUED | OUTPATIENT
Start: 2020-12-17 | End: 2020-12-18 | Stop reason: HOSPADM

## 2020-12-17 RX ORDER — ONDANSETRON 4 MG/1
4 TABLET, ORALLY DISINTEGRATING ORAL EVERY 6 HOURS PRN
Status: DISCONTINUED | OUTPATIENT
Start: 2020-12-17 | End: 2020-12-18 | Stop reason: HOSPADM

## 2020-12-17 RX ORDER — METOCLOPRAMIDE HYDROCHLORIDE 5 MG/ML
10 INJECTION INTRAMUSCULAR; INTRAVENOUS EVERY 6 HOURS
Status: DISCONTINUED | OUTPATIENT
Start: 2020-12-17 | End: 2020-12-18 | Stop reason: HOSPADM

## 2020-12-17 RX ORDER — FENTANYL CITRATE 50 UG/ML
INJECTION, SOLUTION INTRAMUSCULAR; INTRAVENOUS PRN
Status: DISCONTINUED | OUTPATIENT
Start: 2020-12-17 | End: 2020-12-17

## 2020-12-17 RX ORDER — ONDANSETRON 2 MG/ML
4 INJECTION INTRAMUSCULAR; INTRAVENOUS EVERY 6 HOURS PRN
Status: DISCONTINUED | OUTPATIENT
Start: 2020-12-17 | End: 2020-12-18 | Stop reason: HOSPADM

## 2020-12-17 RX ORDER — PROPOFOL 10 MG/ML
INJECTION, EMULSION INTRAVENOUS PRN
Status: DISCONTINUED | OUTPATIENT
Start: 2020-12-17 | End: 2020-12-17

## 2020-12-17 RX ORDER — LIDOCAINE 40 MG/G
CREAM TOPICAL
Status: DISCONTINUED | OUTPATIENT
Start: 2020-12-17 | End: 2020-12-17 | Stop reason: HOSPADM

## 2020-12-17 RX ORDER — ONDANSETRON 2 MG/ML
INJECTION INTRAMUSCULAR; INTRAVENOUS PRN
Status: DISCONTINUED | OUTPATIENT
Start: 2020-12-17 | End: 2020-12-17

## 2020-12-17 RX ADMIN — HYDROMORPHONE HYDROCHLORIDE 0.2 MG: 1 INJECTION, SOLUTION INTRAMUSCULAR; INTRAVENOUS; SUBCUTANEOUS at 10:42

## 2020-12-17 RX ADMIN — TAZOBACTAM SODIUM AND PIPERACILLIN SODIUM 3.38 G: 375; 3 INJECTION, SOLUTION INTRAVENOUS at 21:08

## 2020-12-17 RX ADMIN — ROCURONIUM BROMIDE 10 MG: 10 INJECTION INTRAVENOUS at 13:07

## 2020-12-17 RX ADMIN — FENTANYL CITRATE 100 MCG: 50 INJECTION, SOLUTION INTRAMUSCULAR; INTRAVENOUS at 13:07

## 2020-12-17 RX ADMIN — METOCLOPRAMIDE HYDROCHLORIDE 10 MG: 5 INJECTION INTRAMUSCULAR; INTRAVENOUS at 22:34

## 2020-12-17 RX ADMIN — TAZOBACTAM SODIUM AND PIPERACILLIN SODIUM 3.38 G: 375; 3 INJECTION, SOLUTION INTRAVENOUS at 05:23

## 2020-12-17 RX ADMIN — HYDROCODONE BITARTRATE AND ACETAMINOPHEN 2 TABLET: 5; 325 TABLET ORAL at 01:34

## 2020-12-17 RX ADMIN — SODIUM CHLORIDE, POTASSIUM CHLORIDE, SODIUM LACTATE AND CALCIUM CHLORIDE: 600; 310; 30; 20 INJECTION, SOLUTION INTRAVENOUS at 12:31

## 2020-12-17 RX ADMIN — PROPOFOL 100 MG: 10 INJECTION, EMULSION INTRAVENOUS at 12:37

## 2020-12-17 RX ADMIN — METOCLOPRAMIDE HYDROCHLORIDE 10 MG: 5 INJECTION INTRAMUSCULAR; INTRAVENOUS at 16:27

## 2020-12-17 RX ADMIN — FENTANYL CITRATE 100 MCG: 50 INJECTION, SOLUTION INTRAMUSCULAR; INTRAVENOUS at 12:37

## 2020-12-17 RX ADMIN — HYDROCODONE BITARTRATE AND ACETAMINOPHEN 2 TABLET: 5; 325 TABLET ORAL at 05:30

## 2020-12-17 RX ADMIN — SODIUM CHLORIDE, POTASSIUM CHLORIDE, SODIUM LACTATE AND CALCIUM CHLORIDE: 600; 310; 30; 20 INJECTION, SOLUTION INTRAVENOUS at 18:37

## 2020-12-17 RX ADMIN — SODIUM CHLORIDE: 9 INJECTION, SOLUTION INTRAVENOUS at 05:21

## 2020-12-17 RX ADMIN — ONDANSETRON 4 MG: 2 INJECTION INTRAMUSCULAR; INTRAVENOUS at 13:24

## 2020-12-17 RX ADMIN — ROCURONIUM BROMIDE 40 MG: 10 INJECTION INTRAVENOUS at 12:37

## 2020-12-17 RX ADMIN — LIDOCAINE HYDROCHLORIDE 70 MG: 10 INJECTION, SOLUTION INFILTRATION; PERINEURAL at 12:37

## 2020-12-17 RX ADMIN — MIDAZOLAM 2 MG: 1 INJECTION INTRAMUSCULAR; INTRAVENOUS at 12:31

## 2020-12-17 RX ADMIN — TAZOBACTAM SODIUM AND PIPERACILLIN SODIUM 3.38 G: 375; 3 INJECTION, SOLUTION INTRAVENOUS at 15:29

## 2020-12-17 RX ADMIN — SUGAMMADEX 150 MG: 100 INJECTION, SOLUTION INTRAVENOUS at 13:42

## 2020-12-17 RX ADMIN — DEXAMETHASONE SODIUM PHOSPHATE 8 MG: 4 INJECTION, SOLUTION INTRA-ARTICULAR; INTRALESIONAL; INTRAMUSCULAR; INTRAVENOUS; SOFT TISSUE at 12:37

## 2020-12-17 RX ADMIN — KETOROLAC TROMETHAMINE 15 MG: 30 INJECTION, SOLUTION INTRAMUSCULAR at 13:24

## 2020-12-17 RX ADMIN — TAZOBACTAM SODIUM AND PIPERACILLIN SODIUM 3.38 G: 375; 3 INJECTION, SOLUTION INTRAVENOUS at 10:37

## 2020-12-17 RX ADMIN — DOCUSATE SODIUM AND SENNOSIDES 2 TABLET: 8.6; 5 TABLET ORAL at 19:54

## 2020-12-17 RX ADMIN — FENTANYL CITRATE 50 MCG: 50 INJECTION, SOLUTION INTRAMUSCULAR; INTRAVENOUS at 13:25

## 2020-12-17 RX ADMIN — HYDROCODONE BITARTRATE AND ACETAMINOPHEN 2 TABLET: 5; 325 TABLET ORAL at 22:55

## 2020-12-17 SDOH — HEALTH STABILITY: MENTAL HEALTH: CURRENT SMOKER: 0

## 2020-12-17 ASSESSMENT — ENCOUNTER SYMPTOMS: DYSRHYTHMIAS: 1

## 2020-12-17 NOTE — H&P
[facility] History and Physical    Iam Villanueva MRN# [id number]   Age: 60 year old YOB: 1960     Date of Admission:  12/16/2020    Home clinic: [facility]  Primary care provider: Catherine Johnson          Chief Complaint:   Abdominal pain    History is obtained from the patient          History of Present Illness:   Gives similar hx as to ED. Per ED hx  Pt presents to the emergency department with acute onset of abdominal pain last evening at 11 PM with no previous history of similar pain.  Patient reports the abdominal pain started off as sharp and steady and intermittent and rated the pain a 6 out of 10.  Patient states as the night went on the pain has become constant and severe with his sharpness and rates the pain a 9 out of 10.    Patient states that he has one episode of vomiting last evening but otherwise specifically denies diaphoresis, fevers, aches, chills, dysuria, hematuria, bright red rectal bleeding, diarrhea, constipation chest pain, shortness of air, difficulty breathing, mental confusion, dizziness, lightheadedness.  Patient reports that he tried MiraLAX this morning at 11 AM as he had a mild sensation of rectal urgency with no results.           Past Medical History:     Patient Active Problem List    Diagnosis Date Noted     Acute cholecystitis 12/16/2020     Priority: Medium               Past Surgical History:    No past surgical history on file.   None        Social History:     Social History     Socioeconomic History     Marital status:      Spouse name: Not on file     Number of children: Not on file     Years of education: Not on file     Highest education level: Not on file   Occupational History     Not on file   Social Needs     Financial resource strain: Not on file     Food insecurity     Worry: Not on file     Inability: Not on file     Transportation needs     Medical: Not on file     Non-medical: Not on file   Tobacco Use     Smoking  "status: Not on file   Substance and Sexual Activity     Alcohol use: Not on file     Drug use: Not on file     Sexual activity: Not on file   Lifestyle     Physical activity     Days per week: Not on file     Minutes per session: Not on file     Stress: Not on file   Relationships     Social connections     Talks on phone: Not on file     Gets together: Not on file     Attends Synagogue service: Not on file     Active member of club or organization: Not on file     Attends meetings of clubs or organizations: Not on file     Relationship status: Not on file     Intimate partner violence     Fear of current or ex partner: Not on file     Emotionally abused: Not on file     Physically abused: Not on file     Forced sexual activity: Not on file   Other Topics Concern     Not on file   Social History Narrative     Not on file             Family History:   No family history on file.   Negative for contributing issues         Allergies:   No Known Allergies          Medications:     Prior to Admission medications    Not on File            Review of Systems:   The Review of Systems is negative in ALL other than noted in the HPI          Physical Exam:   Blood pressure 99/71, pulse 80, temperature 98.1  F (36.7  C), temperature source Temporal, resp. rate 20, height 1.676 m (5' 6\"), weight 73.5 kg (162 lb), SpO2 94 %.  GENERAL APPEARANCE: healthy, alert and no distress  EYES: conjunctiva clear, eyes grossly normal  HENT: external ears and nose normal   NECK: supple, no masses or adenopathy  RESP: lungs clear to auscultation - no rales, rhonchi or wheezes  CV: regular rate and rhythm, normal S1 S2, no S3 or S4 and no murmur, click or rub   ABDOMEN: soft, moderate tenderness RUQ, no HSM or masses and bowel sounds normal  MS: no clubbing, cyanosis; no edema  SKIN: clear without significant rashes or lesions  NEURO: Normal strength and tone, sensory exam grossly normal, mentation intact and speech normal         Data:     Lab " "Results   Component Value Date    WBC 13.9 (H) 12/16/2020    HGB 16.3 12/16/2020    HCT 48.0 12/16/2020    MCV 94 12/16/2020     12/16/2020     Lab Results   Component Value Date     12/16/2020    CO2 27 12/16/2020     Lab Results   Component Value Date    BUN 10 12/16/2020     No components found for: SEDRATE  No components found for: DDIMER  No results found for: BNP  No results found for: TSH  No results found for: TROPONIN  UA RESULTS:  Recent Labs   Lab Test 12/16/20  1528   COLOR Yellow   APPEARANCE Clear   URINEGLC Negative   URINEBILI Negative   URINEKETONE Negative   SG 1.015   UBLD Small*   URINEPH 5.0   PROTEIN Negative   NITRITE Negative   LEUKEST Negative   RBCU 1   WBCU 2     Liver Function Studies -   Recent Labs   Lab Test 12/16/20  1434   PROTTOTAL 7.5   ALBUMIN 3.6   BILITOTAL 0.6   ALKPHOS 83   AST 15   ALT 21       EKG results:  SR    RADIOLOGY:  US-GALLBLADDER: Stones and sludge are noted within the gallbladder. The  gallbladder wall is mildly thickened at 0.3 cm. No pericholecystic  fluid is identified. The sonographer reports a positive sonographic  Alonso's sign. Findings are suspicious for acute cholecystitis.    CT ABD-IMPRESSION:   1.  Gallbladder findings are suspicious for acute cholecystitis.  Consider right upper quadrant ultrasound for further characterization.  2.  Colonic diverticulosis, without convincing evidence for  diverticulitis.     A/P  Acute cholecystitis  Has moderate pain. LFTs nl. Mildly elevated WBC. No fever. Started on zosyn in ED. Will continue antbx, po/iv narcs for pain control, surgery to see in AM    Asymptomatic covid testing  For hospital admission. Low risk-if negative, will not retest    ? H/o afib 25 yrs ago  No records. Per pt he was told he may have \"mild leaky valve\" and had ? \" afib'--no issues last 25 yrs. Not seen any MD. Has been in good health. EKG shows SR with LAFB.  No intervention.     DVT PROF-ambulate     Luz Siddiqui, " MD  [phone]

## 2020-12-17 NOTE — OP NOTE
Preop diagnosis: Acute cholecystitis     postop diagnosis: Same    Procedure: Laparoscopic cholecystectomy with placement of Frantz-Hernández drain    Surgeon: Gagan    Assistant surgeon: Lalit Gross PA-C (needed for expertise in camera operation retraction hemostasis wound closure and suctioning)    Anesthesia: General endotracheal Reash CRNA    Procedure: Patient was placed in the supine position and general endotracheal anesthesia was induced.  His abdomen was clipped of extraneous hair and cleaned and draped in sterile manner.  1/4% Marcaine plain was used to anesthetize all port sites.  Small subumbilical curvilinear incision made and subcutaneous tissues dissected to fascia.  Fascia opened sharply and 12 mm blunt trocar inserted.  Carbon dioxide insufflated to 15 mmHg.  Patient placed to reverse Trendelenburg left side down.  Under direct vision, subxiphoid 11 mm trocar placed as were 2 right-sided 5 mm trochars.  Prior to the completion of the case a third right-sided 5 mm trocar was placed for retraction.  The gallbladder was located in the right upper quadrant.  It was distended tense hemorrhagic and necrotic.  A needle was inserted and 30 cc of thick bilious fluid was expressed.  The gallbladder was then grasped and elevated.  There was edema and scarring all the way from the fundus down to the neck of the gallbladder.  With dissection using blunt dissection and a little bit of electrocautery the cystic duct was isolated.  It was encircled, clipped twice proximally once distally and ligated.  In a similar fashion 2 branches of the cystic artery were located clipped and ligated.  The gallbladder was then removed from liver bed using electrocautery.  This took about half hour because the gallbladder was densely adhered to the liver and all tissue planes were obliterated.  There was quite a bit of scarring in the area as well.  When complete the gallbladder was placed into an Endo Catch bag and brought out  through the subxiphoid port which had to be enlarged to accommodate the stones.  2 L of warm normal saline solution was used to irrigate out the abdominal cavity and this was sucked free until the effluent was clear.  Several small bleeding areas on the liver bed were controlled with electrocautery.  The liver bed and zach hepatis were in covered with FloSeal and finally a piece of Surgicel dressing was placed over the liver bed for additional hemostasis.  Lastly a 10 mm flat Frantz-Hernández drain was placed into Morison's pouch and brought out through one of the 5 mm port sites.  Finally all trochars were removed under direct vision and air allowed to desufflate.  The fascial defect of the subumbilical port was closed using an 0 Vicryl suture in a figure-of-eight fashion and injected with Marcaine.  The fascial defect of the subxiphoid port was also closed using an 0 Vicryl suture in a figure-of-eight fashion and injected with Marcaine.  All wounds were irrigated with normal saline and the skin closed using 4-0 Vicryl running subcuticular stitches and dressed with Dermabond.  The NYDIA drain was placed to bulb suction.    Estimated blood loss: 20 mL    IV fluid: 800 mL

## 2020-12-17 NOTE — PROGRESS NOTES
Pt got up to brush teeth and then called saying he was lightheaded.  Bed alarms placed and instructed not to get up by self.  Oxygen checked and pt sat was 88% on room air.  Placed on 2 liters per nasal canula and saturation was 92%.   Pt feels sweaty.  Cool rag given and instructed on incentive spirometer

## 2020-12-17 NOTE — ANESTHESIA CARE TRANSFER NOTE
Patient: Iam Villanueva    Procedure(s):  CHOLECYSTECTOMY, LAPAROSCOPIC    Diagnosis: Acute cholecystitis [K81.0]  Diagnosis Additional Information: No value filed.    Anesthesia Type:   No value filed.     Note:  Airway :Nasal Cannula  Patient transferred to:PACU  Comments: Patient's VSS. Spontaneous respirations. Patient awake and oriented. IV patent. Report to TANJA Olson.Handoff Report: Identifed the Patient, Identified the Reponsible Provider, Reviewed the pertinent medical history, Discussed the surgical course, Reviewed Intra-OP anesthesia mangement and issues during anesthesia, Set expectations for post-procedure period and Allowed opportunity for questions and acknowledgement of understanding      Vitals: (Last set prior to Anesthesia Care Transfer)    CRNA VITALS  12/17/2020 1325 - 12/17/2020 1425      12/17/2020             Pulse:  105    SpO2:  98 %                Electronically Signed By: BRIAN Zepeda CRNA  December 17, 2020  2:43 PM

## 2020-12-17 NOTE — ANESTHESIA PREPROCEDURE EVALUATION
"Anesthesia Pre-Procedure Evaluation    Patient: Iam Villanueva   MRN: [id number] : 1960          Preoperative Diagnosis: Acute cholecystitis [K81.0]    Procedure(s):  CHOLECYSTECTOMY, LAPAROSCOPIC    No past medical history on file.  No past surgical history on file.    Anesthesia Evaluation     . Pt has had prior anesthetic. Type: General and MAC    No history of anesthetic complications          ROS/MED HX    ENT/Pulmonary: Comment: Hypoxic- on NC O2 w/ sats in mid       Neurologic:  - neg neurologic ROS     Cardiovascular:     (+) Dyslipidemia, ----. : . . . :. dysrhythmias (hx of 25 yrs ago) a-fib, valvular problems/murmurs (pt was told approx 25 yrs ago he has a \"mild leaky valve\"- no intervention and no records available) . Previous cardiac testing date:results:date: results:ECG reviewed date:20 results:Sinus Rhythm   -Left axis -anterior fascicular block.     ABNORMAL    date: results:          METS/Exercise Tolerance:  >4 METS   Hematologic:  - neg hematologic  ROS       Musculoskeletal:  - neg musculoskeletal ROS       GI/Hepatic:     (+) hiatal hernia, cholecystitis/cholelithiasis,       Renal/Genitourinary:  - ROS Renal section negative       Endo:  - neg endo ROS       Psychiatric:  - neg psychiatric ROS       Infectious Disease:  - neg infectious disease ROS       Malignancy:      - no malignancy   Other:                          Physical Exam  Normal systems: cardiovascular and pulmonary    Airway   Mallampati: II  TM distance: >3 FB  Neck ROM: full    Dental   Comment: Edentulous top; missing teeth on bottom    Cardiovascular       Pulmonary             Lab Results   Component Value Date    WBC 10.5 2020    HGB 15.5 2020    HCT 45.1 2020     2020     2020    POTASSIUM 3.5 2020    CHLORIDE 105 2020    CO2 27 2020    BUN 10 2020    CR 0.96 2020     (H) 2020    MELECIO 8.7 2020    ALBUMIN 3.6 " "12/16/2020    PROTTOTAL 7.5 12/16/2020    ALT 21 12/16/2020    AST 15 12/16/2020    ALKPHOS 83 12/16/2020    BILITOTAL 0.6 12/16/2020    LIPASE 53 (L) 12/16/2020       Preop Vitals  BP Readings from Last 3 Encounters:   12/17/20 103/60    Pulse Readings from Last 3 Encounters:   12/17/20 95      Resp Readings from Last 3 Encounters:   12/17/20 16    SpO2 Readings from Last 3 Encounters:   12/17/20 93%      Temp Readings from Last 1 Encounters:   12/17/20 36.5  C (97.7  F) (Oral)    Ht Readings from Last 1 Encounters:   12/16/20 1.676 m (5' 5.98\")      Wt Readings from Last 1 Encounters:   12/16/20 74.2 kg (163 lb 9.3 oz)    Estimated body mass index is 26.42 kg/m  as calculated from the following:    Height as of this encounter: 1.676 m (5' 5.98\").    Weight as of this encounter: 74.2 kg (163 lb 9.3 oz).       Anesthesia Plan      History & Physical Review  History and physical reviewed and following examination; no interval change.    ASA Status:  2 .    NPO Status:  > 8 hours    Plan for General with Intravenous and Propofol induction. Maintenance will be Balanced.    PONV prophylaxis:  Ondansetron (or other 5HT-3) and Dexamethasone or Solumedrol    The patient is not a current smoker      Postoperative Care  Postoperative pain management:  IV analgesics, Oral pain medications and Multi-modal analgesia.      Consents  Anesthetic plan, risks, benefits and alternatives discussed with:  Patient.  Use of blood products discussed: Awilda .   BRIAN Jimenes CRNA  "

## 2020-12-17 NOTE — ANESTHESIA POSTPROCEDURE EVALUATION
Patient: Iam Villanueva    Procedure(s):  CHOLECYSTECTOMY, LAPAROSCOPIC    Diagnosis:Acute cholecystitis [K81.0]  Diagnosis Additional Information: No value filed.    Anesthesia Type:  No value filed.    Note:  Anesthesia Post Evaluation    Patient location during evaluation: Bedside  Patient participation: Able to fully participate in evaluation  Level of consciousness: awake and alert  Pain management: adequate  Airway patency: patent  Cardiovascular status: acceptable  Respiratory status: acceptable  Hydration status: acceptable  PONV: none     Anesthetic complications: None          Last vitals:  Vitals:    12/17/20 1400 12/17/20 1415 12/17/20 1430   BP: 123/73 110/76 122/76   Pulse: 85 82 82   Resp: 9 12 8   Temp:  36.9  C (98.4  F)    SpO2: 95% 93% 93%         Electronically Signed By: BRIAN Zepeda CRNA  December 17, 2020  2:44 PM

## 2020-12-17 NOTE — PROGRESS NOTES
"[facility] Hospitalist Service      Subjective:  Continued right upper quadrant pain.  Felt dizzy this morning-O2 sat checked and found to be 88%.  Now on 2 L of oxygen.    Reports pain with deep breath and right upper quadrant.      Review of Systems:  CONSTITUTIONAL: Dizzy earlier  INTEGUMENTARY/SKIN: NEGATIVE for worrisome rashes, moles or lesions  EYES: NEGATIVE for vision changes or irritation  ENT/MOUTH: NEGATIVE for ear, mouth and throat problems  RESP: NEGATIVE for significant cough or SOB  BREAST: NEGATIVE for masses, tenderness or discharge  CV: NEGATIVE for chest pain, palpitations or peripheral edema  GI: Pain right upper quadrant worse with breathing  : NEGATIVE for frequency, dysuria, or hematuria  MUSCULOSKELETAL: NEGATIVE for significant arthralgias or myalgia  NEURO: NEGATIVE for weakness, dizziness or paresthesias  ENDOCRINE: NEGATIVE for temperature intolerance, skin/hair changes  HEME: NEGATIVE for bleeding problems  PSYCHIATRIC: NEGATIVE for changes in mood or affect    Physical Exam:  Vitals Were Reviewed    Patient Vitals for the past 16 hrs:   BP Temp Temp src Pulse Resp SpO2 Height Weight   12/17/20 0928 -- -- -- -- -- 93 % -- --   12/17/20 0912 -- -- -- -- -- 94 % -- --   12/17/20 0855 -- -- -- -- -- (!) 88 % -- --   12/17/20 0854 -- 97.7  F (36.5  C) Oral -- 16 -- -- --   12/17/20 0649 103/60 98.8  F (37.1  C) Oral 95 18 90 % -- --   12/16/20 2332 111/60 98.3  F (36.8  C) Oral 73 18 91 % -- --   12/16/20 2129 133/67 98.3  F (36.8  C) Oral 68 18 92 % 1.676 m (5' 5.98\") 74.2 kg (163 lb 9.3 oz)   12/16/20 2100 (!) 141/107 -- -- 66 -- 95 % -- --   12/16/20 2045 -- -- -- -- -- 90 % -- --   12/16/20 2030 128/72 -- -- 70 -- 91 % -- --   12/16/20 2015 -- -- -- -- -- 93 % -- --   12/16/20 2000 99/71 -- -- 80 -- 94 % -- --   12/16/20 1945 -- -- -- -- -- 95 % -- --   12/16/20 1930 134/76 -- -- 67 -- 94 % -- --   12/16/20 1915 -- -- -- -- -- 93 % -- --   12/16/20 1900 124/71 -- -- 76 -- 95 % -- " --   12/16/20 1845 -- -- -- -- -- 92 % -- --   12/16/20 1830 130/67 -- -- 67 -- 94 % -- --         Intake/Output Summary (Last 24 hours) at 12/17/2020 1020  Last data filed at 12/17/2020 0521  Gross per 24 hour   Intake 803 ml   Output --   Net 803 ml       GENERAL APPEARANCE: healthy, alert and no distress  EYES: conjunctiva clear, eyes grossly normal  RESP: Some crackles both bases, appears to splint to some degree.  CV: regular rate and rhythm, normal S1 S2, no S3 or S4 and no murmur, click or rub   ABDOMEN: Tender right upper quadrant  MS: no clubbing, cyanosis; no edema  SKIN: clear without significant rashes or lesions    Lab:  Recent Labs   Lab Test 12/16/20  1434      POTASSIUM 3.5   CHLORIDE 105   CO2 27   ANIONGAP 3   *   BUN 10   CR 0.96   MELECIO 8.7     CBC RESULTS:   Recent Labs   Lab Test 12/17/20  0500 12/16/20  1434   WBC 10.5 13.9*   RBC 4.80 5.11   HGB 15.5 16.3   HCT 45.1 48.0    200       Results for orders placed or performed during the hospital encounter of 12/16/20 (from the past 24 hour(s))   CBC with platelets differential   Result Value Ref Range    WBC 13.9 (H) 4.0 - 11.0 10e9/L    RBC Count 5.11 4.4 - 5.9 10e12/L    Hemoglobin 16.3 13.3 - 17.7 g/dL    Hematocrit 48.0 40.0 - 53.0 %    MCV 94 78 - 100 fl    MCH 31.9 26.5 - 33.0 pg    MCHC 34.0 31.5 - 36.5 g/dL    RDW 11.7 10.0 - 15.0 %    Platelet Count 200 150 - 450 10e9/L    Diff Method Automated Method     % Neutrophils 74.7 %    % Lymphocytes 14.3 %    % Monocytes 9.1 %    % Eosinophils 0.9 %    % Basophils 0.6 %    % Immature Granulocytes 0.4 %    Nucleated RBCs 0 0 /100    Absolute Neutrophil 10.4 (H) 1.6 - 8.3 10e9/L    Absolute Lymphocytes 2.0 0.8 - 5.3 10e9/L    Absolute Monocytes 1.3 0.0 - 1.3 10e9/L    Absolute Eosinophils 0.1 0.0 - 0.7 10e9/L    Absolute Basophils 0.1 0.0 - 0.2 10e9/L    Abs Immature Granulocytes 0.1 0 - 0.4 10e9/L    Absolute Nucleated RBC 0.0    Comprehensive metabolic panel   Result Value Ref  Range    Sodium 135 133 - 144 mmol/L    Potassium 3.5 3.4 - 5.3 mmol/L    Chloride 105 94 - 109 mmol/L    Carbon Dioxide 27 20 - 32 mmol/L    Anion Gap 3 3 - 14 mmol/L    Glucose 104 (H) 70 - 99 mg/dL    Urea Nitrogen 10 7 - 30 mg/dL    Creatinine 0.96 0.66 - 1.25 mg/dL    GFR Estimate 86 >60 mL/min/[1.73_m2]    GFR Estimate If Black >90 >60 mL/min/[1.73_m2]    Calcium 8.7 8.5 - 10.1 mg/dL    Bilirubin Total 0.6 0.2 - 1.3 mg/dL    Albumin 3.6 3.4 - 5.0 g/dL    Protein Total 7.5 6.8 - 8.8 g/dL    Alkaline Phosphatase 83 40 - 150 U/L    ALT 21 0 - 70 U/L    AST 15 0 - 45 U/L   Troponin I   Result Value Ref Range    Troponin I ES <0.015 0.000 - 0.045 ug/L   Lipase   Result Value Ref Range    Lipase 53 (L) 73 - 393 U/L   UA reflex to Microscopic   Result Value Ref Range    Color Urine Yellow     Appearance Urine Clear     Glucose Urine Negative NEG^Negative mg/dL    Bilirubin Urine Negative NEG^Negative    Ketones Urine Negative NEG^Negative mg/dL    Specific Gravity Urine 1.015 1.003 - 1.035    Blood Urine Small (A) NEG^Negative    pH Urine 5.0 5.0 - 7.0 pH    Protein Albumin Urine Negative NEG^Negative mg/dL    Urobilinogen mg/dL 0.0 0.0 - 2.0 mg/dL    Nitrite Urine Negative NEG^Negative    Leukocyte Esterase Urine Negative NEG^Negative    Source Midstream Urine     RBC Urine 1 0 - 2 /HPF    WBC Urine 2 0 - 5 /HPF    Mucous Urine Present (A) NEG^Negative /LPF   Urine Culture    Specimen: Midstream Urine   Result Value Ref Range    Specimen Description Midstream Urine     Special Requests Specimen received in preservative     Culture Micro PENDING    CT Abdomen Pelvis w Contrast    Narrative    CT ABDOMEN AND PELVIS WITH CONTRAST 12/16/2020 4:48 PM    CLINICAL HISTORY: Abdominal pain.    TECHNIQUE: CT scan of the abdomen and pelvis was performed following  injection of IV contrast. Multiplanar reformats were obtained. Dose  reduction techniques were used.    CONTRAST: 79 mL Isovue 370.    COMPARISON: None.    FINDINGS:    LOWER CHEST: Mild scarring and/or atelectasis at both lung bases  posteriorly. The visualized lung bases are otherwise clear. Small  hiatal hernia.    HEPATOBILIARY: There are multiple gallstones within the gallbladder.  The gallbladder appears distended, and the gallbladder wall is likely  thickened. There is mild pericholecystic inflammatory stranding.  Findings are suspicious for acute cholecystitis. The liver is  unremarkable.    PANCREAS: Normal.    SPLEEN: Normal.    ADRENAL GLANDS: Normal.    KIDNEYS/BLADDER: Unremarkable. No hydronephrosis.    BOWEL: Colonic diverticulosis. No convincing evidence for  diverticulitis. No bowel obstruction. Unremarkable appendix.    PELVIC ORGANS: Mild prostatic enlargement and central calcification.    LYMPH NODES: No enlarged lymph nodes are identified in the abdomen or  pelvis.    VASCULATURE: Mild atherosclerotic aortoiliac calcification.    ADDITIONAL FINDINGS: None.    MUSCULOSKELETAL: Unremarkable.      Impression    IMPRESSION:   1.  Gallbladder findings are suspicious for acute cholecystitis.  Consider right upper quadrant ultrasound for further characterization.  2.  Colonic diverticulosis, without convincing evidence for  diverticulitis.    MANUEL TELLEZ MD   US Abdomen Limited    Narrative    US ABDOMEN LIMITED 12/16/2020 7:26 PM    CLINICAL HISTORY: Abdominal pain. Abnormal gallbladder on CT.    TECHNIQUE: Limited abdominal ultrasound.    COMPARISON: CT of the abdomen and pelvis performed 12/16/2020.    FINDINGS:    GALLBLADDER: Stones and sludge are noted within the gallbladder. The  gallbladder wall is mildly thickened at 0.3 cm. No pericholecystic  fluid is identified. The sonographer reports a positive sonographic  Alonso's sign. Findings are suspicious for acute cholecystitis.    BILE DUCTS: There is no biliary dilatation. The common bile duct  measures 6 mm. The entire common duct could not be visualized due to  overlying bowel gas.    LIVER:  Unremarkable. No evidence for fatty infiltration of the liver.  No focal hepatic masses.    RIGHT KIDNEY: Unremarkable. No hydronephrosis.    PANCREAS: The visualized portions of the pancreas are normal.    No ascites.      Impression    IMPRESSION: Findings are suspicious for acute cholecystitis.    MANUEL TELLEZ MD   Asymptomatic COVID-19 Virus (Coronavirus) by PCR    Specimen: Nasopharyngeal   Result Value Ref Range    COVID-19 Virus PCR to [facility] - Source Nasopharyngeal     COVID-19 Virus PCR to [facility] - Result       Test received-See reflex to IDDL test SARS CoV2 (COVID-19) Virus RT-PCR   SARS-CoV-2 COVID-19 Virus (Coronavirus) RT-PCR Nasopharyngeal    Specimen: Nasopharyngeal   Result Value Ref Range    SARS-CoV-2 Virus Specimen Source Nasopharyngeal     SARS-CoV-2 PCR Result NEGATIVE     SARS-CoV-2 PCR Comment       Testing was performed using the Xpert Xpress SARS-CoV-2 Assay on the Cepheid Gene-Xpert   Instrument Systems. Additional information about this Emergency Use Authorization (EUA)   assay can be found via the Lab Guide.     CBC with platelets differential   Result Value Ref Range    WBC 10.5 4.0 - 11.0 10e9/L    RBC Count 4.80 4.4 - 5.9 10e12/L    Hemoglobin 15.5 13.3 - 17.7 g/dL    Hematocrit 45.1 40.0 - 53.0 %    MCV 94 78 - 100 fl    MCH 32.3 26.5 - 33.0 pg    MCHC 34.4 31.5 - 36.5 g/dL    RDW 11.8 10.0 - 15.0 %    Platelet Count 171 150 - 450 10e9/L    Diff Method Automated Method     % Neutrophils 76.4 %    % Lymphocytes 10.8 %    % Monocytes 10.5 %    % Eosinophils 0.9 %    % Basophils 0.6 %    % Immature Granulocytes 0.8 %    Nucleated RBCs 0 0 /100    Absolute Neutrophil 8.1 1.6 - 8.3 10e9/L    Absolute Lymphocytes 1.1 0.8 - 5.3 10e9/L    Absolute Monocytes 1.1 0.0 - 1.3 10e9/L    Absolute Eosinophils 0.1 0.0 - 0.7 10e9/L    Absolute Basophils 0.1 0.0 - 0.2 10e9/L    Abs Immature Granulocytes 0.1 0 - 0.4 10e9/L    Absolute Nucleated RBC 0.0        Assessment and Plan:    Acute  "cholecystitis  Has moderate pain. LFTs nl. Mildly elevated WBC that has now resolved. No fever. Started on zosyn in ED. Plan is for pt to go to OR at 12:30 PM     Neg covid 19 pcr  No precautions    Mild hypoxia  Sat to 88 this AM. May be from atelectasis or pain meds. Checking CXR prior to OR  On two liters currently.  Rales present on exam bases.     ? H/o afib 25 yrs ago  No records. Per pt he was told he may have \"mild leaky valve\" and had ? \" afib'--no issues last 25 yrs. Not seen any MD. Has been in good health. EKG shows SR with LAFB.  No intervention.      DVT PROF-ambulate    Plan- checking cxr, to OR today.  Unclear if patient would be able to discharge today.  Will need to monitor O2 status.    12:28 PM cxr with right base atelectasis vs infiltrate. Assume atelectasis vs infiltrate.  "

## 2020-12-17 NOTE — PROGRESS NOTES
"[place] [facility] ADMISSION NOTE    Patient admitted to room 2309 at approximately 2115 via cart from emergency room. Patient was accompanied by transport tech.     Verbal SBAR report received from TANJA Bautista prior to patient arrival.     Patient ambulated to bed independently. Patient alert and oriented X 3. Pain is controlled with current analgesics.  Medication(s) being used: narcotic analgesics including hydromorphone (Dilaudid). 0-10 Pain Scale: 4. Admission vital signs: Blood pressure 133/67, pulse 68, temperature 98.3  F (36.8  C), temperature source Oral, resp. rate 18, height 1.676 m (5' 5.98\"), weight 74.2 kg (163 lb 9.3 oz), SpO2 92 %. Patient was oriented to plan of care, call light, bed controls, tv, telephone, bathroom and visiting hours.     Risk Assessment    The following safety risks were identified during admission: none. Yellow risk band applied: NO.     Skin Initial Assessment    This writer admitted this patient and was unable to complete a full skin assessment (pt refused) and Constantino score in the Adult PCS flowsheet. Appropriate interventions initiated as needed.     Secondary skin check completed by Pt refused skin assessment.         Education    Patient has a Martha to Observation order: Yes  Observation education completed and documented: Yes      Siri Dias RN    "

## 2020-12-17 NOTE — PLAN OF CARE
"Pt alert, oriented, independent. NaCl running at 100 ml/hr. On RA. Prn norco given for abdominal pain. NPO since midnight. LS clear. Denies nausea, SOB. /60 (BP Location: Right arm)   Pulse 73   Temp 98.3  F (36.8  C) (Oral)   Resp 18   Ht 1.676 m (5' 5.98\")   Wt 74.2 kg (163 lb 9.3 oz)   SpO2 91%   BMI 26.42 kg/m      "

## 2020-12-17 NOTE — ANESTHESIA PROCEDURE NOTES
Airway   Date/Time: 12/17/2020 12:40 PM   Patient location during procedure: OR    Staff -   CRNA: Aidee Naidu APRN CRNA  Performed By: CRNA    Consent for Airway   Urgency: elective    Indications and Patient Condition  Indications for airway management: yifan-procedural  Induction type:intravenousMask difficulty assessment: 2 - vent by mask + OA or adjuvant +/- NMBA    Final Airway Details  Final airway type: endotracheal airway  Successful airway:ETT - single  Endotracheal Airway Details   ETT size (mm): 7.5  Cuffed: yes  Cuff volume (mL): 8  Successful intubation technique: direct laryngoscopy  Grade View of Cords: 1  Adjucts: stylet  Measured from: lips  Secured at (cm): 24  Secured with: silk tape  Bite block used: None    Post intubation assessment   Placement verified by: capnometry, equal breath sounds and chest rise   Number of attempts at approach: 1  Number of other approaches attempted: 0  Secured with:silk tape  Ease of procedure: easy  Dentition: Intact and Unchanged (edentulous top)

## 2020-12-18 VITALS
TEMPERATURE: 98.3 F | WEIGHT: 163.58 LBS | RESPIRATION RATE: 16 BRPM | OXYGEN SATURATION: 91 % | HEART RATE: 74 BPM | HEIGHT: 66 IN | SYSTOLIC BLOOD PRESSURE: 101 MMHG | DIASTOLIC BLOOD PRESSURE: 56 MMHG | BODY MASS INDEX: 26.29 KG/M2

## 2020-12-18 LAB
ALBUMIN SERPL-MCNC: 2.6 G/DL (ref 3.4–5)
ALP SERPL-CCNC: 111 U/L (ref 40–150)
ALT SERPL W P-5'-P-CCNC: 169 U/L (ref 0–70)
ANION GAP SERPL CALCULATED.3IONS-SCNC: 3 MMOL/L (ref 3–14)
AST SERPL W P-5'-P-CCNC: 111 U/L (ref 0–45)
BASOPHILS # BLD AUTO: 0 10E9/L (ref 0–0.2)
BASOPHILS NFR BLD AUTO: 0.1 %
BILIRUB SERPL-MCNC: 1.4 MG/DL (ref 0.2–1.3)
BUN SERPL-MCNC: 15 MG/DL (ref 7–30)
CALCIUM SERPL-MCNC: 8.4 MG/DL (ref 8.5–10.1)
CHLORIDE SERPL-SCNC: 108 MMOL/L (ref 94–109)
CO2 SERPL-SCNC: 26 MMOL/L (ref 20–32)
CREAT SERPL-MCNC: 0.96 MG/DL (ref 0.66–1.25)
DIFFERENTIAL METHOD BLD: ABNORMAL
EOSINOPHIL # BLD AUTO: 0 10E9/L (ref 0–0.7)
EOSINOPHIL NFR BLD AUTO: 0.1 %
ERYTHROCYTE [DISTWIDTH] IN BLOOD BY AUTOMATED COUNT: 11.9 % (ref 10–15)
GFR SERPL CREATININE-BSD FRML MDRD: 86 ML/MIN/{1.73_M2}
GLUCOSE SERPL-MCNC: 125 MG/DL (ref 70–99)
HCT VFR BLD AUTO: 40.3 % (ref 40–53)
HGB BLD-MCNC: 13.8 G/DL (ref 13.3–17.7)
IMM GRANULOCYTES # BLD: 0.1 10E9/L (ref 0–0.4)
IMM GRANULOCYTES NFR BLD: 0.8 %
LYMPHOCYTES # BLD AUTO: 0.7 10E9/L (ref 0.8–5.3)
LYMPHOCYTES NFR BLD AUTO: 4.3 %
MCH RBC QN AUTO: 32.2 PG (ref 26.5–33)
MCHC RBC AUTO-ENTMCNC: 34.2 G/DL (ref 31.5–36.5)
MCV RBC AUTO: 94 FL (ref 78–100)
MONOCYTES # BLD AUTO: 1.4 10E9/L (ref 0–1.3)
MONOCYTES NFR BLD AUTO: 8.9 %
NEUTROPHILS # BLD AUTO: 13.2 10E9/L (ref 1.6–8.3)
NEUTROPHILS NFR BLD AUTO: 85.8 %
NRBC # BLD AUTO: 0 10*3/UL
NRBC BLD AUTO-RTO: 0 /100
PLATELET # BLD AUTO: 160 10E9/L (ref 150–450)
POTASSIUM SERPL-SCNC: 4.4 MMOL/L (ref 3.4–5.3)
PROT SERPL-MCNC: 6.2 G/DL (ref 6.8–8.8)
RBC # BLD AUTO: 4.29 10E12/L (ref 4.4–5.9)
SODIUM SERPL-SCNC: 137 MMOL/L (ref 133–144)
WBC # BLD AUTO: 15.4 10E9/L (ref 4–11)

## 2020-12-18 PROCEDURE — 85025 COMPLETE CBC W/AUTO DIFF WBC: CPT | Performed by: SURGERY

## 2020-12-18 PROCEDURE — 80053 COMPREHEN METABOLIC PANEL: CPT | Performed by: SURGERY

## 2020-12-18 PROCEDURE — G0378 HOSPITAL OBSERVATION PER HR: HCPCS

## 2020-12-18 PROCEDURE — 250N000011 HC RX IP 250 OP 636: Performed by: SURGERY

## 2020-12-18 PROCEDURE — 250N000013 HC RX MED GY IP 250 OP 250 PS 637: Performed by: SURGERY

## 2020-12-18 PROCEDURE — 96376 TX/PRO/DX INJ SAME DRUG ADON: CPT

## 2020-12-18 PROCEDURE — 250N000013 HC RX MED GY IP 250 OP 250 PS 637: Performed by: INTERNAL MEDICINE

## 2020-12-18 PROCEDURE — 36415 COLL VENOUS BLD VENIPUNCTURE: CPT | Performed by: SURGERY

## 2020-12-18 RX ORDER — HYDROCODONE BITARTRATE AND ACETAMINOPHEN 5; 325 MG/1; MG/1
1-2 TABLET ORAL EVERY 6 HOURS PRN
Qty: 20 TABLET | Refills: 0 | Status: ON HOLD | OUTPATIENT
Start: 2020-12-18 | End: 2020-12-23

## 2020-12-18 RX ADMIN — HYDROCODONE BITARTRATE AND ACETAMINOPHEN 2 TABLET: 5; 325 TABLET ORAL at 03:10

## 2020-12-18 RX ADMIN — TAZOBACTAM SODIUM AND PIPERACILLIN SODIUM 3.38 G: 375; 3 INJECTION, SOLUTION INTRAVENOUS at 09:08

## 2020-12-18 RX ADMIN — HYDROCODONE BITARTRATE AND ACETAMINOPHEN 2 TABLET: 5; 325 TABLET ORAL at 11:17

## 2020-12-18 RX ADMIN — HYDROCODONE BITARTRATE AND ACETAMINOPHEN 2 TABLET: 5; 325 TABLET ORAL at 07:01

## 2020-12-18 RX ADMIN — METOCLOPRAMIDE HYDROCHLORIDE 10 MG: 5 INJECTION INTRAMUSCULAR; INTRAVENOUS at 10:34

## 2020-12-18 RX ADMIN — TAZOBACTAM SODIUM AND PIPERACILLIN SODIUM 3.38 G: 375; 3 INJECTION, SOLUTION INTRAVENOUS at 03:09

## 2020-12-18 RX ADMIN — METOCLOPRAMIDE HYDROCHLORIDE 10 MG: 5 INJECTION INTRAMUSCULAR; INTRAVENOUS at 06:09

## 2020-12-18 NOTE — PROGRESS NOTES
POD#1    Tolerating diet. Pain controlled.    I/O last 3 completed shifts:  In: 1280 [P.O.:480; I.V.:800]  Out: 195 [Drains:175; Blood:20]    Patient Vitals for the past 24 hrs:   BP Temp Temp src Pulse Resp SpO2   12/18/20 0718 106/52 96.5  F (35.8  C) Oral 69 18 91 %   12/17/20 2308 -- -- -- -- -- 91 %   12/17/20 2248 (!) 151/58 98  F (36.7  C) Oral 87 18 92 %   12/17/20 2100 -- -- -- -- -- 93 %   12/17/20 1955 125/64 -- -- 85 -- --   12/17/20 1927 124/69 98.4  F (36.9  C) Oral 84 18 94 %   12/17/20 1832 119/64 -- -- 88 -- 94 %   12/17/20 1621 130/70 -- -- -- -- --   12/17/20 1535 (!) 140/68 -- -- 90 -- 93 %   12/17/20 1455 132/74 -- -- -- -- 92 %   12/17/20 1444 (!) 142/74 -- -- 94 16 --   12/17/20 1433 -- -- -- 80 12 93 %   12/17/20 1430 122/76 -- -- 82 8 93 %   12/17/20 1415 110/76 98.4  F (36.9  C) Oral 82 12 93 %   12/17/20 1400 123/73 -- -- 85 9 95 %   12/17/20 1357 121/73 99.4  F (37.4  C) Oral 86 13 94 %   12/17/20 1157 111/68 -- -- -- 16 95 %   12/17/20 1132 111/65 98.3  F (36.8  C) Oral 87 16 95 %   12/17/20 1042 -- -- -- -- 16 --   12/17/20 0928 -- -- -- -- -- 93 %   12/17/20 0912 -- -- -- -- -- 94 %     CBC  Recent Labs   Lab Test 12/18/20  0449   WBC 15.4*   RBC 4.29*   HGB 13.8   HCT 40.3   MCV 94   MCH 32.2   MCHC 34.2   RDW 11.9          BMP  Recent Labs   Lab Test 12/18/20 0449      POTASSIUM 4.4   MELECIO 8.4*   CHLORIDE 108   CO2 26   BUN 15   CR 0.96   *       LFTs  Recent Labs   Lab Test 12/18/20 0449   PROTTOTAL 6.2*   ALBUMIN 2.6*   BILITOTAL 1.4*   ALKPHOS 111   *   *     Exam:  AXO3 NAD  Neuro - Strength 5/5 all major groups, sensation intact, PERRL  Lungs - CTA  CV - RRR  Abd - Soft, non-distended, non-tender, +BS, wounds clean, dry, and intact with no erythema. NYDIA with serosanguinous drainage  Extr - No edema    A/P: s/p lap jumana doing well.  Home today.    Qasim Farah MD

## 2020-12-18 NOTE — PLAN OF CARE
AOx4, up independently in room. NYDIA drain with 15ml out this shift, prn norco #2 x 2 for abd discomfort. Weaned to RA this am, sats 92%, IV SL, eating and drinking well. 4 lap sites SIERRA, no drainage. Rested comfortably throughout shift.

## 2020-12-18 NOTE — PLAN OF CARE
[facility] [facility] DISCHARGE NOTE    Patient discharged to home at 2:31 PM via wheel chair. Accompanied by spouse and staff. Discharge instructions reviewed with patient, opportunity offered to ask questions. Prescriptions filled and sent with patient upon discharge. All belongings sent with patient.    Miroslava Lomeli RN

## 2020-12-18 NOTE — PLAN OF CARE
Patient is alert and orientated x4. Ind in room. Oxygen weaned down to .5L and is saturating at 91-93%. Patient dips below 90% when on RA. Lung sounds are clear. Patient utilizing incentive spirometer. ABD incision sites intact with no drainage present. Total NYDIA drainage of 110cc this shift. Patient denies pain.

## 2020-12-18 NOTE — DISCHARGE INSTRUCTIONS
DISCHARGE INSTRUCTIONS     1. Empty drain as necessary. No need to keep track of volume of output.    2. No heavy lifting (>30lbs)  for 1 month.    3. You will have some discomfort at the incision sites. This is expected. This should improve over the next 2-3 days. Ice and pain medication will help with this pain. Use prescribed pain medication as instructed.     4. Some bruising and mild swelling is normal after surgery. The area below and around the incision(s) may be hard and elevated. This is part of the normal healing process. This will resolve slowly over the next several months.     5. Your wounds are covered with glue. The glue is water tight and so you can shower or bathe immediately following surgery.     6. Use the following medications (in addition to your normal meds) as shown:      Tylenol (acetaminophen) 500 mg every 6 hours as needed for pain.    Do not take more than 1000 mg of Tylenol every 6 hours -OR- 4g in a day    Motrin (ibuprophen) 600 mg every 6 hours as needed for pain. Take with food.     8. Notify Clinic at [phone] if:     Your discomfort is not relieved by your pain medication     You have signs of infection such as temperature above 100.4 degrees orally,  chills, or increasing daily discomfort.     Incision site is becoming more red and/or there is purulent drainage.      9. Follow up with Dr Farah in next Wednesday for drain removal.

## 2020-12-18 NOTE — DISCHARGE SUMMARY
Physician Discharge Summary     Patient ID:  Iam Villanueva  [id number]  60 year old  1960    Admit date: 12/16/2020    Discharge date and time: 12/18/2020     Admitting Physician: Luz Siddiqui MD     Discharge Physician: Gagan PATEL    Admission Diagnoses: Acute cholecystitis [K81.0]    Discharge Diagnoses: Acute cholecystitsi    Admission Condition: poor    Discharged Condition: fair    Indication for Admission: RUQ abd pain    Hospital Course: Admitted to to OR for lap jumana. Kept over for abx and pain control.  Sent home the following day.    Consults: none    Significant Diagnostic Studies: none    Treatments: surgery: lap jumana    Discharge Exam:  See daily progress notes    Disposition: home    Patient Instructions:   Current Discharge Medication List      START taking these medications    Details   HYDROcodone-acetaminophen (NORCO) 5-325 MG tablet Take 1-2 tablets by mouth every 6 hours as needed for pain  Qty: 20 tablet, Refills: 0    Associated Diagnoses: Postoperative pain           Activity: no heavy lifting for 4 weeks  Diet: regular diet  Wound Care: keep wound clean and dry    Follow-up with Dr Farah in 1 week.    Signed:  Qasim Farah MD  12/18/2020  9:00 AM

## 2020-12-21 LAB — COPATH REPORT: NORMAL

## 2020-12-22 ENCOUNTER — HOSPITAL ENCOUNTER (INPATIENT)
Facility: CLINIC | Age: 60
LOS: 2 days | Discharge: HOME | DRG: 446 | End: 2020-12-24
Attending: INTERNAL MEDICINE | Admitting: STUDENT IN AN ORGANIZED HEALTH CARE EDUCATION/TRAINING PROGRAM
Payer: COMMERCIAL

## 2020-12-22 ENCOUNTER — HOSPITAL ENCOUNTER (EMERGENCY)
Facility: CLINIC | Age: 60
Discharge: HOME | End: 2020-12-22
Attending: EMERGENCY MEDICINE | Admitting: EMERGENCY MEDICINE
Payer: COMMERCIAL

## 2020-12-22 ENCOUNTER — OFFICE VISIT (OUTPATIENT)
Dept: SURGERY | Facility: CLINIC | Age: 60
End: 2020-12-22
Payer: COMMERCIAL

## 2020-12-22 VITALS
SYSTOLIC BLOOD PRESSURE: 104 MMHG | WEIGHT: 163.58 LBS | HEART RATE: 77 BPM | DIASTOLIC BLOOD PRESSURE: 68 MMHG | HEIGHT: 66 IN | TEMPERATURE: 96.3 F | BODY MASS INDEX: 26.29 KG/M2

## 2020-12-22 VITALS
SYSTOLIC BLOOD PRESSURE: 106 MMHG | OXYGEN SATURATION: 93 % | BODY MASS INDEX: 25.71 KG/M2 | HEART RATE: 77 BPM | DIASTOLIC BLOOD PRESSURE: 71 MMHG | HEIGHT: 66 IN | TEMPERATURE: 96.6 F | RESPIRATION RATE: 16 BRPM | WEIGHT: 160 LBS

## 2020-12-22 DIAGNOSIS — R10.84 ABDOMINAL PAIN, GENERALIZED: ICD-10-CM

## 2020-12-22 DIAGNOSIS — K91.89 BILE LEAK, POSTOPERATIVE: ICD-10-CM

## 2020-12-22 DIAGNOSIS — K83.8 BILE LEAK, POSTOPERATIVE: Primary | ICD-10-CM

## 2020-12-22 DIAGNOSIS — G89.18 POSTOPERATIVE PAIN: ICD-10-CM

## 2020-12-22 DIAGNOSIS — K91.89 BILE LEAK, POSTOPERATIVE: Primary | ICD-10-CM

## 2020-12-22 DIAGNOSIS — K83.8 BILE LEAK, POSTOPERATIVE: ICD-10-CM

## 2020-12-22 PROBLEM — K83.9 BILE LEAK: Status: ACTIVE | Noted: 2020-12-22

## 2020-12-22 LAB
ALBUMIN SERPL-MCNC: 3.4 G/DL (ref 3.4–5)
ALP SERPL-CCNC: 101 U/L (ref 40–150)
ALT SERPL W P-5'-P-CCNC: 74 U/L (ref 0–70)
ANION GAP SERPL CALCULATED.3IONS-SCNC: 6 MMOL/L (ref 3–14)
AST SERPL W P-5'-P-CCNC: 31 U/L (ref 0–45)
BASOPHILS # BLD AUTO: 0.1 10E9/L (ref 0–0.2)
BASOPHILS NFR BLD AUTO: 0.8 %
BILIRUB SERPL-MCNC: 0.9 MG/DL (ref 0.2–1.3)
BUN SERPL-MCNC: 17 MG/DL (ref 7–30)
CALCIUM SERPL-MCNC: 9.2 MG/DL (ref 8.5–10.1)
CHLORIDE SERPL-SCNC: 101 MMOL/L (ref 94–109)
CO2 SERPL-SCNC: 26 MMOL/L (ref 20–32)
CREAT SERPL-MCNC: 0.93 MG/DL (ref 0.66–1.25)
DIFFERENTIAL METHOD BLD: NORMAL
EOSINOPHIL # BLD AUTO: 0.1 10E9/L (ref 0–0.7)
EOSINOPHIL NFR BLD AUTO: 1.2 %
ERYTHROCYTE [DISTWIDTH] IN BLOOD BY AUTOMATED COUNT: 11.9 % (ref 10–15)
GFR SERPL CREATININE-BSD FRML MDRD: 89 ML/MIN/{1.73_M2}
GLUCOSE SERPL-MCNC: 107 MG/DL (ref 70–99)
HCT VFR BLD AUTO: 46.7 % (ref 40–53)
HGB BLD-MCNC: 16 G/DL (ref 13.3–17.7)
IMM GRANULOCYTES # BLD: 0.1 10E9/L (ref 0–0.4)
IMM GRANULOCYTES NFR BLD: 1 %
INR PPP: 1.11 (ref 0.86–1.14)
LABORATORY COMMENT REPORT: NORMAL
LIPASE SERPL-CCNC: 41 U/L (ref 73–393)
LYMPHOCYTES # BLD AUTO: 1.4 10E9/L (ref 0.8–5.3)
LYMPHOCYTES NFR BLD AUTO: 13 %
MCH RBC QN AUTO: 32.4 PG (ref 26.5–33)
MCHC RBC AUTO-ENTMCNC: 34.3 G/DL (ref 31.5–36.5)
MCV RBC AUTO: 95 FL (ref 78–100)
MONOCYTES # BLD AUTO: 1.3 10E9/L (ref 0–1.3)
MONOCYTES NFR BLD AUTO: 11.4 %
NEUTROPHILS # BLD AUTO: 8 10E9/L (ref 1.6–8.3)
NEUTROPHILS NFR BLD AUTO: 72.6 %
NRBC # BLD AUTO: 0 10*3/UL
NRBC BLD AUTO-RTO: 0 /100
PLATELET # BLD AUTO: 247 10E9/L (ref 150–450)
POTASSIUM SERPL-SCNC: 4.3 MMOL/L (ref 3.4–5.3)
PROT SERPL-MCNC: 8.3 G/DL (ref 6.8–8.8)
RBC # BLD AUTO: 4.94 10E12/L (ref 4.4–5.9)
SARS-COV-2 RNA SPEC QL NAA+PROBE: NEGATIVE
SODIUM SERPL-SCNC: 133 MMOL/L (ref 133–144)
SPECIMEN SOURCE: NORMAL
WBC # BLD AUTO: 11 10E9/L (ref 4–11)

## 2020-12-22 PROCEDURE — 96376 TX/PRO/DX INJ SAME DRUG ADON: CPT | Performed by: EMERGENCY MEDICINE

## 2020-12-22 PROCEDURE — 99024 POSTOP FOLLOW-UP VISIT: CPT | Performed by: SURGERY

## 2020-12-22 PROCEDURE — 36415 COLL VENOUS BLD VENIPUNCTURE: CPT | Performed by: STUDENT IN AN ORGANIZED HEALTH CARE EDUCATION/TRAINING PROGRAM

## 2020-12-22 PROCEDURE — 96375 TX/PRO/DX INJ NEW DRUG ADDON: CPT | Performed by: EMERGENCY MEDICINE

## 2020-12-22 PROCEDURE — 85610 PROTHROMBIN TIME: CPT | Performed by: STUDENT IN AN ORGANIZED HEALTH CARE EDUCATION/TRAINING PROGRAM

## 2020-12-22 PROCEDURE — 99285 EMERGENCY DEPT VISIT HI MDM: CPT | Mod: 25 | Performed by: EMERGENCY MEDICINE

## 2020-12-22 PROCEDURE — 99222 1ST HOSP IP/OBS MODERATE 55: CPT | Mod: AI | Performed by: STUDENT IN AN ORGANIZED HEALTH CARE EDUCATION/TRAINING PROGRAM

## 2020-12-22 PROCEDURE — 83690 ASSAY OF LIPASE: CPT | Performed by: EMERGENCY MEDICINE

## 2020-12-22 PROCEDURE — 99285 EMERGENCY DEPT VISIT HI MDM: CPT | Performed by: EMERGENCY MEDICINE

## 2020-12-22 PROCEDURE — 85025 COMPLETE CBC W/AUTO DIFF WBC: CPT | Performed by: EMERGENCY MEDICINE

## 2020-12-22 PROCEDURE — 250N000013 HC RX MED GY IP 250 OP 250 PS 637: Performed by: STUDENT IN AN ORGANIZED HEALTH CARE EDUCATION/TRAINING PROGRAM

## 2020-12-22 PROCEDURE — 96365 THER/PROPH/DIAG IV INF INIT: CPT | Performed by: EMERGENCY MEDICINE

## 2020-12-22 PROCEDURE — 120N000002 HC R&B MED SURG/OB UMMC

## 2020-12-22 PROCEDURE — C9803 HOPD COVID-19 SPEC COLLECT: HCPCS | Performed by: EMERGENCY MEDICINE

## 2020-12-22 PROCEDURE — 80053 COMPREHEN METABOLIC PANEL: CPT | Performed by: EMERGENCY MEDICINE

## 2020-12-22 PROCEDURE — 250N000011 HC RX IP 250 OP 636: Performed by: EMERGENCY MEDICINE

## 2020-12-22 PROCEDURE — 250N000011 HC RX IP 250 OP 636: Performed by: STUDENT IN AN ORGANIZED HEALTH CARE EDUCATION/TRAINING PROGRAM

## 2020-12-22 PROCEDURE — 999N000128 HC STATISTIC PERIPHERAL IV START W/O US GUIDANCE

## 2020-12-22 PROCEDURE — 87635 SARS-COV-2 COVID-19 AMP PRB: CPT | Performed by: EMERGENCY MEDICINE

## 2020-12-22 RX ORDER — NALOXONE HYDROCHLORIDE 0.4 MG/ML
0.4 INJECTION, SOLUTION INTRAMUSCULAR; INTRAVENOUS; SUBCUTANEOUS
Status: DISCONTINUED | OUTPATIENT
Start: 2020-12-22 | End: 2020-12-24 | Stop reason: HOSPADM

## 2020-12-22 RX ORDER — LIDOCAINE 40 MG/G
CREAM TOPICAL
Status: DISCONTINUED | OUTPATIENT
Start: 2020-12-22 | End: 2020-12-24 | Stop reason: HOSPADM

## 2020-12-22 RX ORDER — PIPERACILLIN SODIUM, TAZOBACTAM SODIUM 4; .5 G/20ML; G/20ML
4.5 INJECTION, POWDER, LYOPHILIZED, FOR SOLUTION INTRAVENOUS EVERY 6 HOURS
Status: DISCONTINUED | OUTPATIENT
Start: 2020-12-22 | End: 2020-12-24 | Stop reason: HOSPADM

## 2020-12-22 RX ORDER — ONDANSETRON 2 MG/ML
4 INJECTION INTRAMUSCULAR; INTRAVENOUS EVERY 30 MIN PRN
Status: DISCONTINUED | OUTPATIENT
Start: 2020-12-22 | End: 2020-12-22 | Stop reason: HOSPADM

## 2020-12-22 RX ORDER — AMOXICILLIN 250 MG
1 CAPSULE ORAL 2 TIMES DAILY PRN
Status: DISCONTINUED | OUTPATIENT
Start: 2020-12-22 | End: 2020-12-24 | Stop reason: HOSPADM

## 2020-12-22 RX ORDER — OXYCODONE HYDROCHLORIDE 5 MG/1
5 TABLET ORAL EVERY 4 HOURS PRN
Status: DISCONTINUED | OUTPATIENT
Start: 2020-12-22 | End: 2020-12-24 | Stop reason: HOSPADM

## 2020-12-22 RX ORDER — AMPICILLIN AND SULBACTAM 2; 1 G/1; G/1
3 INJECTION, POWDER, FOR SOLUTION INTRAMUSCULAR; INTRAVENOUS EVERY 6 HOURS
Status: DISCONTINUED | OUTPATIENT
Start: 2020-12-22 | End: 2020-12-22 | Stop reason: HOSPADM

## 2020-12-22 RX ORDER — AMOXICILLIN 250 MG
2 CAPSULE ORAL 2 TIMES DAILY PRN
Status: DISCONTINUED | OUTPATIENT
Start: 2020-12-22 | End: 2020-12-24 | Stop reason: HOSPADM

## 2020-12-22 RX ORDER — HYDROMORPHONE HYDROCHLORIDE 1 MG/ML
0.5 INJECTION, SOLUTION INTRAMUSCULAR; INTRAVENOUS; SUBCUTANEOUS
Status: DISCONTINUED | OUTPATIENT
Start: 2020-12-22 | End: 2020-12-22 | Stop reason: HOSPADM

## 2020-12-22 RX ORDER — ACETAMINOPHEN 650 MG/1
650 SUPPOSITORY RECTAL EVERY 4 HOURS PRN
Status: DISCONTINUED | OUTPATIENT
Start: 2020-12-22 | End: 2020-12-24 | Stop reason: HOSPADM

## 2020-12-22 RX ORDER — NALOXONE HYDROCHLORIDE 0.4 MG/ML
0.2 INJECTION, SOLUTION INTRAMUSCULAR; INTRAVENOUS; SUBCUTANEOUS
Status: DISCONTINUED | OUTPATIENT
Start: 2020-12-22 | End: 2020-12-24 | Stop reason: HOSPADM

## 2020-12-22 RX ORDER — OXYCODONE HYDROCHLORIDE 5 MG/1
5 TABLET ORAL ONCE
Status: COMPLETED | OUTPATIENT
Start: 2020-12-22 | End: 2020-12-22

## 2020-12-22 RX ORDER — ACETAMINOPHEN 325 MG/1
650 TABLET ORAL EVERY 4 HOURS PRN
Status: DISCONTINUED | OUTPATIENT
Start: 2020-12-22 | End: 2020-12-24 | Stop reason: HOSPADM

## 2020-12-22 RX ADMIN — ONDANSETRON 4 MG: 2 INJECTION INTRAMUSCULAR; INTRAVENOUS at 11:50

## 2020-12-22 RX ADMIN — PIPERACILLIN AND TAZOBACTAM 4.5 G: 4; .5 INJECTION, POWDER, FOR SOLUTION INTRAVENOUS at 22:59

## 2020-12-22 RX ADMIN — OXYCODONE HYDROCHLORIDE 5 MG: 5 TABLET ORAL at 23:00

## 2020-12-22 RX ADMIN — HYDROMORPHONE HYDROCHLORIDE 0.5 MG: 1 INJECTION, SOLUTION INTRAMUSCULAR; INTRAVENOUS; SUBCUTANEOUS at 11:49

## 2020-12-22 RX ADMIN — AMPICILLIN SODIUM AND SULBACTAM SODIUM 3 G: 2; 1 INJECTION, POWDER, FOR SOLUTION INTRAMUSCULAR; INTRAVENOUS at 13:11

## 2020-12-22 RX ADMIN — HYDROMORPHONE HYDROCHLORIDE 0.5 MG: 1 INJECTION, SOLUTION INTRAMUSCULAR; INTRAVENOUS; SUBCUTANEOUS at 17:28

## 2020-12-22 ASSESSMENT — MIFFLIN-ST. JEOR
SCORE: 1499.43
SCORE: 1483.51
SCORE: 1479.75

## 2020-12-22 ASSESSMENT — ENCOUNTER SYMPTOMS
WOUND: 1
ABDOMINAL PAIN: 1
HEADACHES: 0
CHILLS: 0
FEVER: 0
SHORTNESS OF BREATH: 0
SORE THROAT: 0
DYSURIA: 0
COUGH: 0
COLOR CHANGE: 1
MYALGIAS: 0

## 2020-12-22 NOTE — PROGRESS NOTES
[facility]  Transfer Triage Note    Date of call: 12/22/20  Time of call: 12:16 PM    Is pandemic COVID-19 a concern? NO    Reason for transfer: Procedure can be done here and not at referring hospital   Diagnosis: bile leak     Outside Records: Not available  Additional records requested to be faxed to [phone].    Stability of Patient: Patient is vitally stable, with no critical labs, and will likely remain stable throughout the transfer process  ICU: No    Expected Time of Arrival for Transfer: 8-24 hours    Arrival Location:  [place] [address] Phone: [phone]    Recommendations for Management and Stabilization: Given    Additional Comments patient underwent laparoscopic cholecystectomy for necrotizing cholecystitis at [facility] 5 days ago presented today to for follow-up in clinic found to have a bile leak outpatient surgery discussed case with Dr. Soren MCGEE from the  recommended transfer to [facility] for possible ERCP. patient currently is hemodynamically stable and no concern for cholangitis.  Patient accepted to [facility].  Preprocedure Covid testing pending    Parag Greenfield MD

## 2020-12-22 NOTE — PROGRESS NOTES
"General Surgery Post Op    Pt returns for follow up visit s/p Laparoscopic cholecystectomy on 12/17/20.    Patient is doing poorly as of yesterday with increased pain, nausea, and drain output.  Poor appetite.  He had a bowel movement, small for him.    Physical exam: Vitals: /68 (BP Location: Right arm, Patient Position: Sitting, Cuff Size: Adult Regular)   Pulse 77   Temp 96.3  F (35.7  C) (Tympanic)   Ht 1.676 m (5' 5.98\")   Wt 74.2 kg (163 lb 9.3 oz)   BMI 26.42 kg/m    BMI= Body mass index is 26.42 kg/m .  Exam:  Constitutional: alert and moderate distress  Cardiovascular: negative  Respiratory: negative  Gastrointestinal: Mild distension, moderate TTP RUQ, RLQ.  NYDIA with bilious drainage    Path:  FINAL DIAGNOSIS:   Gallbladder, resection   - Necrotizing cholecystitis with cholelithiasis     Assessment:     ICD-10-CM    1. Bile leak, postoperative  K91.89     K83.8      Plan: Mr. Villanueva presents after laparoscopic cholecystectomy for necrotizing cholecystitis.  He is doing poorly with likely bile leak on exam today.  I reached out to on call GI at [facility], Dr. Doty.  He is agreeable to accepting the patient at [facility] for ERCP.  Patient will need COVID testing prior.  I am awaiting call back from hospitalist at [facility] for accepting direct admission rather than admitting patient at our facility and transferring.  Patient is hemodynamically stable at this time.      Theodore Dahl, DO on 12/22/2020 at 10:00 AM      "

## 2020-12-22 NOTE — LETTER
"    12/22/2020         RE: Iam Villanueva  [address] [facility] [address]        Dear Colleague,    Thank you for referring your patient, Iam Villanueva, to the [facility]. Please see a copy of my visit note below.    General Surgery Post Op    Pt returns for follow up visit s/p Laparoscopic cholecystectomy on 12/17/20.    Patient is doing poorly as of yesterday with increased pain, nausea, and drain output.  Poor appetite.  He had a bowel movement, small for him.    Physical exam: Vitals: /68 (BP Location: Right arm, Patient Position: Sitting, Cuff Size: Adult Regular)   Pulse 77   Temp 96.3  F (35.7  C) (Tympanic)   Ht 1.676 m (5' 5.98\")   Wt 74.2 kg (163 lb 9.3 oz)   BMI 26.42 kg/m    BMI= Body mass index is 26.42 kg/m .  Exam:  Constitutional: alert and moderate distress  Cardiovascular: negative  Respiratory: negative  Gastrointestinal: Mild distension, moderate TTP RUQ, RLQ.  NYDIA with bilious drainage    Path:  FINAL DIAGNOSIS:   Gallbladder, resection   - Necrotizing cholecystitis with cholelithiasis     Assessment:     ICD-10-CM    1. Bile leak, postoperative  K91.89     K83.8      Plan: Mr. Villanueva presents after laparoscopic cholecystectomy for necrotizing cholecystitis.  He is doing poorly with likely bile leak on exam today.  I reached out to on call GI at [facility], Dr. Doty.  He is agreeable to accepting the patient at [facility] for ERCP.  Patient will need COVID testing prior.  I am awaiting call back from hospitalist at [facility] for accepting direct admission rather than admitting patient at our facility and transferring.  Patient is hemodynamically stable at this time.      Theodore Dahl, DO on 12/22/2020 at 10:00 AM          Again, thank you for allowing me to participate in the care of your patient.        Sincerely,        Theodore Dahl, DO    "

## 2020-12-22 NOTE — ED TRIAGE NOTES
bile leak, coming from clinic for pain, antibiotics and transfer for ERCP; had surgery done on Wed after gallstones; left the hospital Sat. Plan is to transfer to where they can do an ERCP, clinic [facility] to get [facility]

## 2020-12-22 NOTE — NURSING NOTE
"Initial /68 (BP Location: Right arm, Patient Position: Sitting, Cuff Size: Adult Regular)   Pulse 77   Temp 96.3  F (35.7  C) (Tympanic)   Ht 1.676 m (5' 5.98\")   Wt 74.2 kg (163 lb 9.3 oz)   BMI 26.42 kg/m   Estimated body mass index is 26.42 kg/m  as calculated from the following:    Height as of this encounter: 1.676 m (5' 5.98\").    Weight as of this encounter: 74.2 kg (163 lb 9.3 oz). .    Jovanna Miles MA    "

## 2020-12-22 NOTE — ED PROVIDER NOTES
History     Chief Complaint   Patient presents with     Post-op Problem     bile leak, coming from clinic for pain, antibiotics and transfer for ERCP     HPI  Iam Villanueva is a 60 year old male with history significant for laparoscopic cholecystectomy for necrotizing cholecystitis on 12/17/2020.  Patient tolerated procedure well and was discharged to home.  He started to have abdominal pain yesterday which went across his abdomen.  He had follow-up in clinic today and was found to have significant amount of bile in his NYDIA drain.  Dr. Dahl from general surgery accompanying the patient over to the emergency department.  He spoke with Dr. Doty with gastroenterology with plans for ERCP and tentative plans for transfer to [facility].  Patient reports that episode yesterday he was feeling well.  No fever, chills, nausea, vomiting or diarrhea.  Pain began yesterday across his lower abdomen is moderate.  Says it has been getting progressively worse.  No cough, shortness of breath, sore throat, headache.    The patient's PMHx, Surgical Hx, Allergies, and Medications were all reviewed with the patient.    Allergies:  No Known Allergies    Problem List:    Patient Active Problem List    Diagnosis Date Noted     Acute cholecystitis 12/16/2020     Priority: Medium        Past Medical History:    No past medical history on file.    Past Surgical History:    Past Surgical History:   Procedure Laterality Date     LAPAROSCOPIC CHOLECYSTECTOMY N/A 12/17/2020    Procedure: CHOLECYSTECTOMY, LAPAROSCOPIC;  Surgeon: Qasim Farah MD;  Location: [facility] OR       Family History:    No family history on file.    Social History:  Marital Status:   [2]  Social History     Tobacco Use     Smoking status: Light Tobacco Smoker     Types: Cigarettes     Smokeless tobacco: Never Used   Substance Use Topics     Alcohol use: Not Currently     Drug use: Never        Medications:         HYDROcodone-acetaminophen (NORCO)  "5-325 MG tablet          Review of Systems   Constitutional: Negative for chills and fever.   HENT: Negative for sore throat.    Eyes: Negative for visual disturbance.   Respiratory: Negative for cough and shortness of breath.    Cardiovascular: Negative for chest pain.   Gastrointestinal: Positive for abdominal pain.   Genitourinary: Negative for dysuria.   Musculoskeletal: Negative for myalgias.   Skin: Positive for color change and wound (surgical).   Neurological: Negative for headaches.       Physical Exam   BP: 108/66  Pulse: 77  Temp: 96.6  F (35.9  C)  Resp: 16  Height: 167.6 cm (5' 6\")  Weight: 72.6 kg (160 lb)  SpO2: 98 %    Physical Exam  GEN: Awake, alert, and cooperative.  Appears mildly distressed  HENT: MMM. External ears and nose normal bilaterally.  EYES: EOM intact. Conjunctiva clear. No discharge.   NECK: Supple, symmetric.  CV : Regular rate and rhythm.  PULM: Normal effort.  Speaking in full sentences with no audible wheezing  ABD: Soft, nondistended.  Mild generalized tenderness.  No peritoneal signs.  Ecchymosis across abdomen.  NYDIA drain intact with bilious drainage.  Surgical wounds clean dry and intact.  NEURO: Normal speech. Following commands.  Answering questions and interacting appropriately.   EXT: No gross deformity.  No pitting lower extremity edema.  INT: Warm and dry.        ED Course        Procedures           Critical Care time:  none               Results for orders placed or performed during the hospital encounter of 12/22/20 (from the past 24 hour(s))   CBC with platelets differential   Result Value Ref Range    WBC 11.0 4.0 - 11.0 10e9/L    RBC Count 4.94 4.4 - 5.9 10e12/L    Hemoglobin 16.0 13.3 - 17.7 g/dL    Hematocrit 46.7 40.0 - 53.0 %    MCV 95 78 - 100 fl    MCH 32.4 26.5 - 33.0 pg    MCHC 34.3 31.5 - 36.5 g/dL    RDW 11.9 10.0 - 15.0 %    Platelet Count 247 150 - 450 10e9/L    Diff Method Automated Method     % Neutrophils 72.6 %    % Lymphocytes 13.0 %    % Monocytes " 11.4 %    % Eosinophils 1.2 %    % Basophils 0.8 %    % Immature Granulocytes 1.0 %    Nucleated RBCs 0 0 /100    Absolute Neutrophil 8.0 1.6 - 8.3 10e9/L    Absolute Lymphocytes 1.4 0.8 - 5.3 10e9/L    Absolute Monocytes 1.3 0.0 - 1.3 10e9/L    Absolute Eosinophils 0.1 0.0 - 0.7 10e9/L    Absolute Basophils 0.1 0.0 - 0.2 10e9/L    Abs Immature Granulocytes 0.1 0 - 0.4 10e9/L    Absolute Nucleated RBC 0.0    Comprehensive metabolic panel   Result Value Ref Range    Sodium 133 133 - 144 mmol/L    Potassium 4.3 3.4 - 5.3 mmol/L    Chloride 101 94 - 109 mmol/L    Carbon Dioxide 26 20 - 32 mmol/L    Anion Gap 6 3 - 14 mmol/L    Glucose 107 (H) 70 - 99 mg/dL    Urea Nitrogen 17 7 - 30 mg/dL    Creatinine 0.93 0.66 - 1.25 mg/dL    GFR Estimate 89 >60 mL/min/[1.73_m2]    GFR Estimate If Black >90 >60 mL/min/[1.73_m2]    Calcium 9.2 8.5 - 10.1 mg/dL    Bilirubin Total 0.9 0.2 - 1.3 mg/dL    Albumin 3.4 3.4 - 5.0 g/dL    Protein Total 8.3 6.8 - 8.8 g/dL    Alkaline Phosphatase 101 40 - 150 U/L    ALT 74 (H) 0 - 70 U/L    AST 31 0 - 45 U/L   Lipase   Result Value Ref Range    Lipase 41 (L) 73 - 393 U/L   Asymptomatic SARS-CoV-2 COVID-19 Virus (Coronavirus) by PCR    Specimen: Nasopharyngeal   Result Value Ref Range    SARS-CoV-2 Virus Specimen Source Nasopharyngeal     SARS-CoV-2 PCR Result NEGATIVE     SARS-CoV-2 PCR Comment (Note)        Medications   HYDROmorphone (PF) (DILAUDID) injection 0.5 mg (0.5 mg Intravenous Given 12/22/20 1149)   ondansetron (ZOFRAN) injection 4 mg (4 mg Intravenous Given 12/22/20 1150)   ampicillin-sulbactam (UNASYN) 3 g vial to attach to  mL bag (3 g Intravenous New Bag 12/22/20 1311)       Assessments & Plan (with Medical Decision Making)   60 year old male with past medical history significant for laparoscopic cholecystectomy 5 days prior with 2 days of progressive worsening abdominal pain and NYDIA drain with bilious discharge.  Patient seen in surgery clinic for follow-up and sent to the  emergency department.  Patient was started on Unasyn empirically.  Lab work grossly normal no leukocytosis.  Given need for likely procedure repeat SARS-CoV-2 testing obtained and negative.  Lipase 41.  Generalized abdominal tenderness but no peritoneal signs.  Bilious drainage from NYDIA drain.  I discussed the case with  from the hospitalist triage service and plan to transfer to [facility] when a bed is available.  He was given 0.5 mg IV hydromorphone for pain.  He had moderate improvement in his pain.  On subsequent remeasuring approximate hour later he started to have mild recurrence of pain.  Patient understands plan for transfer and expresses agreement and understanding of plan.    Patient was still in [facility] emergency department at the end of my shift and care of patient signed out to  for any further cares while in the emergency department..  Plan is still to transfer to [facility] pending bed availability.      I have reviewed the nursing notes.         New Prescriptions    No medications on file       Final diagnoses:   Bile leak, postoperative   Abdominal pain, generalized     Norberto Rosenberg MD    12/22/2020   [facility] EMERGENCY DEPT    Disclaimer: This note consists of words and symbols derived from keyboarding and dictation using voice recognition software.  As a result, there may be errors that have gone undetected.  Please consider this when interpreting information found in this note.             Norberto Rosenberg MD  12/22/20 4769

## 2020-12-23 ENCOUNTER — ANESTHESIA EVENT (OUTPATIENT)
Dept: SURGERY | Facility: CLINIC | Age: 60
DRG: 446 | End: 2020-12-23
Payer: COMMERCIAL

## 2020-12-23 ENCOUNTER — APPOINTMENT (OUTPATIENT)
Dept: GENERAL RADIOLOGY | Facility: CLINIC | Age: 60
DRG: 446 | End: 2020-12-23
Attending: INTERNAL MEDICINE
Payer: COMMERCIAL

## 2020-12-23 ENCOUNTER — ANESTHESIA (OUTPATIENT)
Dept: SURGERY | Facility: CLINIC | Age: 60
DRG: 446 | End: 2020-12-23
Payer: COMMERCIAL

## 2020-12-23 LAB — GLUCOSE BLDC GLUCOMTR-MCNC: 99 MG/DL (ref 70–99)

## 2020-12-23 PROCEDURE — 74328 X-RAY BILE DUCT ENDOSCOPY: CPT | Mod: 26 | Performed by: INTERNAL MEDICINE

## 2020-12-23 PROCEDURE — 3E0J8GC INTRODUCTION OF OTHER THERAPEUTIC SUBSTANCE INTO BILIARY AND PANCREATIC TRACT, VIA NATURAL OR ARTIFICIAL OPENING ENDOSCOPIC: ICD-10-PCS | Performed by: INTERNAL MEDICINE

## 2020-12-23 PROCEDURE — 999N000179 XR SURGERY CARM FLUORO LESS THAN 5 MIN W STILLS: Mod: TC

## 2020-12-23 PROCEDURE — 761N000003 HC RECOVERY PHASE 1 LEVEL 2 FIRST HR: Performed by: INTERNAL MEDICINE

## 2020-12-23 PROCEDURE — 250N000011 HC RX IP 250 OP 636: Performed by: STUDENT IN AN ORGANIZED HEALTH CARE EDUCATION/TRAINING PROGRAM

## 2020-12-23 PROCEDURE — 258N000003 HC RX IP 258 OP 636: Performed by: INTERNAL MEDICINE

## 2020-12-23 PROCEDURE — C1769 GUIDE WIRE: HCPCS | Performed by: INTERNAL MEDICINE

## 2020-12-23 PROCEDURE — 250N000013 HC RX MED GY IP 250 OP 250 PS 637: Performed by: STUDENT IN AN ORGANIZED HEALTH CARE EDUCATION/TRAINING PROGRAM

## 2020-12-23 PROCEDURE — 370N000001 HC ANESTHESIA TECHNICAL FEE, 1ST 30 MIN: Performed by: INTERNAL MEDICINE

## 2020-12-23 PROCEDURE — 43274 ERCP DUCT STENT PLACEMENT: CPT | Performed by: INTERNAL MEDICINE

## 2020-12-23 PROCEDURE — 258N000003 HC RX IP 258 OP 636: Performed by: STUDENT IN AN ORGANIZED HEALTH CARE EDUCATION/TRAINING PROGRAM

## 2020-12-23 PROCEDURE — 370N000002 HC ANESTHESIA TECHNICAL FEE, EACH ADDTL 15 MIN: Performed by: INTERNAL MEDICINE

## 2020-12-23 PROCEDURE — 120N000002 HC R&B MED SURG/OB UMMC

## 2020-12-23 PROCEDURE — 360N000025 HC SURGERY LEVEL 3 W FLUORO 1ST 30 MIN - UMMC: Performed by: INTERNAL MEDICINE

## 2020-12-23 PROCEDURE — C1877 STENT, NON-COAT/COV W/O DEL: HCPCS | Performed by: INTERNAL MEDICINE

## 2020-12-23 PROCEDURE — 999N000139 HC STATISTIC PRE-PROCEDURE ASSESSMENT II: Performed by: INTERNAL MEDICINE

## 2020-12-23 PROCEDURE — 255N000002 HC RX 255 OP 636: Performed by: INTERNAL MEDICINE

## 2020-12-23 PROCEDURE — 272N000001 HC OR GENERAL SUPPLY STERILE: Performed by: INTERNAL MEDICINE

## 2020-12-23 PROCEDURE — 999N001017 HC STATISTIC GLUCOSE BY METER IP

## 2020-12-23 PROCEDURE — 250N000009 HC RX 250: Performed by: INTERNAL MEDICINE

## 2020-12-23 PROCEDURE — 360N000023 HC SURGERY LEVEL 3 EA 15 ADDTL MIN UMMC: Performed by: INTERNAL MEDICINE

## 2020-12-23 PROCEDURE — 0F798DZ DILATION OF COMMON BILE DUCT WITH INTRALUMINAL DEVICE, VIA NATURAL OR ARTIFICIAL OPENING ENDOSCOPIC: ICD-10-PCS | Performed by: INTERNAL MEDICINE

## 2020-12-23 PROCEDURE — 250N000011 HC RX IP 250 OP 636: Performed by: INTERNAL MEDICINE

## 2020-12-23 PROCEDURE — 250N000003 HC SEVOFLURANE, EA 15 MIN: Performed by: INTERNAL MEDICINE

## 2020-12-23 PROCEDURE — 99232 SBSQ HOSP IP/OBS MODERATE 35: CPT | Performed by: INTERNAL MEDICINE

## 2020-12-23 PROCEDURE — 761N000004 HC RECOVERY PHASE 1 LEVEL 2 EA ADDTL HR: Performed by: INTERNAL MEDICINE

## 2020-12-23 PROCEDURE — 250N000009 HC RX 250: Performed by: STUDENT IN AN ORGANIZED HEALTH CARE EDUCATION/TRAINING PROGRAM

## 2020-12-23 PROCEDURE — C1726 CATH, BAL DIL, NON-VASCULAR: HCPCS | Performed by: INTERNAL MEDICINE

## 2020-12-23 PROCEDURE — 99222 1ST HOSP IP/OBS MODERATE 55: CPT | Mod: 25 | Performed by: INTERNAL MEDICINE

## 2020-12-23 DEVICE — IMPLANTABLE DEVICE
Type: IMPLANTABLE DEVICE | Site: BILE DUCT | Status: NON-FUNCTIONAL
Removed: 2021-02-16

## 2020-12-23 RX ORDER — HYDROCODONE BITARTRATE AND ACETAMINOPHEN 5; 325 MG/1; MG/1
1-2 TABLET ORAL EVERY 6 HOURS PRN
Qty: 10 TABLET | Refills: 0 | Status: SHIPPED | OUTPATIENT
Start: 2020-12-23 | End: 2021-02-12

## 2020-12-23 RX ORDER — INDOMETHACIN 50 MG/1
SUPPOSITORY RECTAL PRN
Status: DISCONTINUED | OUTPATIENT
Start: 2020-12-23 | End: 2020-12-23 | Stop reason: HOSPADM

## 2020-12-23 RX ORDER — KETAMINE HYDROCHLORIDE 10 MG/ML
INJECTION INTRAMUSCULAR; INTRAVENOUS PRN
Status: DISCONTINUED | OUTPATIENT
Start: 2020-12-23 | End: 2020-12-23

## 2020-12-23 RX ORDER — FENTANYL CITRATE 50 UG/ML
INJECTION, SOLUTION INTRAMUSCULAR; INTRAVENOUS PRN
Status: DISCONTINUED | OUTPATIENT
Start: 2020-12-23 | End: 2020-12-23

## 2020-12-23 RX ORDER — SODIUM CHLORIDE, SODIUM LACTATE, POTASSIUM CHLORIDE, CALCIUM CHLORIDE 600; 310; 30; 20 MG/100ML; MG/100ML; MG/100ML; MG/100ML
INJECTION, SOLUTION INTRAVENOUS CONTINUOUS PRN
Status: DISCONTINUED | OUTPATIENT
Start: 2020-12-23 | End: 2020-12-23

## 2020-12-23 RX ORDER — PROPOFOL 10 MG/ML
INJECTION, EMULSION INTRAVENOUS PRN
Status: DISCONTINUED | OUTPATIENT
Start: 2020-12-23 | End: 2020-12-23

## 2020-12-23 RX ORDER — LIDOCAINE HYDROCHLORIDE 20 MG/ML
INJECTION, SOLUTION INFILTRATION; PERINEURAL PRN
Status: DISCONTINUED | OUTPATIENT
Start: 2020-12-23 | End: 2020-12-23

## 2020-12-23 RX ORDER — DEXAMETHASONE SODIUM PHOSPHATE 4 MG/ML
INJECTION, SOLUTION INTRA-ARTICULAR; INTRALESIONAL; INTRAMUSCULAR; INTRAVENOUS; SOFT TISSUE PRN
Status: DISCONTINUED | OUTPATIENT
Start: 2020-12-23 | End: 2020-12-23

## 2020-12-23 RX ORDER — ONDANSETRON 2 MG/ML
INJECTION INTRAMUSCULAR; INTRAVENOUS PRN
Status: DISCONTINUED | OUTPATIENT
Start: 2020-12-23 | End: 2020-12-23

## 2020-12-23 RX ORDER — IOPAMIDOL 510 MG/ML
INJECTION, SOLUTION INTRAVASCULAR PRN
Status: DISCONTINUED | OUTPATIENT
Start: 2020-12-23 | End: 2020-12-23 | Stop reason: HOSPADM

## 2020-12-23 RX ADMIN — FENTANYL CITRATE 25 MCG: 50 INJECTION, SOLUTION INTRAMUSCULAR; INTRAVENOUS at 11:27

## 2020-12-23 RX ADMIN — LIDOCAINE HYDROCHLORIDE 100 MG: 20 INJECTION, SOLUTION INFILTRATION; PERINEURAL at 11:23

## 2020-12-23 RX ADMIN — OXYCODONE HYDROCHLORIDE 5 MG: 5 TABLET ORAL at 03:56

## 2020-12-23 RX ADMIN — PHENYLEPHRINE HYDROCHLORIDE 200 MCG: 10 INJECTION INTRAVENOUS at 11:51

## 2020-12-23 RX ADMIN — ONDANSETRON 4 MG: 2 INJECTION INTRAMUSCULAR; INTRAVENOUS at 11:55

## 2020-12-23 RX ADMIN — SODIUM CHLORIDE, POTASSIUM CHLORIDE, SODIUM LACTATE AND CALCIUM CHLORIDE: 600; 310; 30; 20 INJECTION, SOLUTION INTRAVENOUS at 11:15

## 2020-12-23 RX ADMIN — MIDAZOLAM 2 MG: 1 INJECTION INTRAMUSCULAR; INTRAVENOUS at 11:15

## 2020-12-23 RX ADMIN — PHENYLEPHRINE HYDROCHLORIDE 100 MCG: 10 INJECTION INTRAVENOUS at 11:38

## 2020-12-23 RX ADMIN — SUGAMMADEX 200 MG: 100 INJECTION, SOLUTION INTRAVENOUS at 12:16

## 2020-12-23 RX ADMIN — PHENYLEPHRINE HYDROCHLORIDE 200 MCG: 10 INJECTION INTRAVENOUS at 12:02

## 2020-12-23 RX ADMIN — PROPOFOL 50 MG: 10 INJECTION, EMULSION INTRAVENOUS at 11:23

## 2020-12-23 RX ADMIN — DEXAMETHASONE SODIUM PHOSPHATE 4 MG: 4 INJECTION, SOLUTION INTRA-ARTICULAR; INTRALESIONAL; INTRAMUSCULAR; INTRAVENOUS; SOFT TISSUE at 11:33

## 2020-12-23 RX ADMIN — PIPERACILLIN AND TAZOBACTAM 4.5 G: 4; .5 INJECTION, POWDER, FOR SOLUTION INTRAVENOUS at 03:57

## 2020-12-23 RX ADMIN — PROPOFOL 40 MG: 10 INJECTION, EMULSION INTRAVENOUS at 12:15

## 2020-12-23 RX ADMIN — ROCURONIUM BROMIDE 40 MG: 10 INJECTION INTRAVENOUS at 11:23

## 2020-12-23 RX ADMIN — PIPERACILLIN AND TAZOBACTAM 4.5 G: 4; .5 INJECTION, POWDER, FOR SOLUTION INTRAVENOUS at 16:44

## 2020-12-23 RX ADMIN — Medication 30 MG: at 11:23

## 2020-12-23 RX ADMIN — OXYCODONE HYDROCHLORIDE 5 MG: 5 TABLET ORAL at 09:21

## 2020-12-23 RX ADMIN — PHENYLEPHRINE HYDROCHLORIDE 100 MCG: 10 INJECTION INTRAVENOUS at 11:46

## 2020-12-23 RX ADMIN — FENTANYL CITRATE 25 MCG: 50 INJECTION, SOLUTION INTRAMUSCULAR; INTRAVENOUS at 11:23

## 2020-12-23 RX ADMIN — PIPERACILLIN AND TAZOBACTAM 4.5 G: 4; .5 INJECTION, POWDER, FOR SOLUTION INTRAVENOUS at 22:00

## 2020-12-23 RX ADMIN — PROPOFOL 20 MG: 10 INJECTION, EMULSION INTRAVENOUS at 11:28

## 2020-12-23 RX ADMIN — PIPERACILLIN AND TAZOBACTAM 4.5 G: 4; .5 INJECTION, POWDER, FOR SOLUTION INTRAVENOUS at 09:51

## 2020-12-23 RX ADMIN — PROPOFOL 30 MG: 10 INJECTION, EMULSION INTRAVENOUS at 11:31

## 2020-12-23 RX ADMIN — FENTANYL CITRATE 50 MCG: 50 INJECTION, SOLUTION INTRAMUSCULAR; INTRAVENOUS at 11:31

## 2020-12-23 RX ADMIN — PHENYLEPHRINE HYDROCHLORIDE 200 MCG: 10 INJECTION INTRAVENOUS at 11:40

## 2020-12-23 SDOH — HEALTH STABILITY: MENTAL HEALTH: CURRENT SMOKER: 0

## 2020-12-23 ASSESSMENT — ACTIVITIES OF DAILY LIVING (ADL)
ADLS_ACUITY_SCORE: 14
ADLS_ACUITY_SCORE: 16
ADLS_ACUITY_SCORE: 14
ADLS_ACUITY_SCORE: 16
ADLS_ACUITY_SCORE: 14

## 2020-12-23 ASSESSMENT — MIFFLIN-ST. JEOR: SCORE: 1499.38

## 2020-12-23 ASSESSMENT — ENCOUNTER SYMPTOMS: DYSRHYTHMIAS: 1

## 2020-12-23 NOTE — PLAN OF CARE
Admitted/transferred from: [place] [address] RN full   skin assessment completed by Liza Angeles, RN and Mojgan SUAZO RN.  Skin assessment finding: issues found bruising around umblicus, 3 lap sites dermabonded from procedure on 12/17, R NYDIA drain   Interventions/actions: skin interventions bandage changes for NYDIA drain, pt education on pressure injuries     Will continue to monitor.

## 2020-12-23 NOTE — UTILIZATION REVIEW
"  Admission Status; Secondary Review Determination     Admission Date: 12/22/2020  8:04 PM      Under the authority of the Utilization Management Committee, the utilization review process indicated a secondary review on the above patient.  The review outcome is based on review of the medical records, discussions with staff, and applying clinical experience noted on the date of the review.        (x)      Inpatient Status Appropriate - This patient's medical care is consistent with medical management for inpatient care and reasonable inpatient medical practice.      () Observation Status Appropriate - This patient does not meet hospital inpatient criteria and is placed in observation status. If this patient's primary payer is Medicare and was admitted as an inpatient, Condition Code 44 should be used and patient status changed to \"observation\".   () Admission Status NOT Appropriate - This patient's medical care is not consistent with medical management for Inpatient or Observation Status.          RATIONALE FOR DETERMINATION   Iam Villanueva is a 60 year old male with recent acute necrotizing cholecystitis s/p laparoscopic cholecystectomy 12/17.  He was brought to [facility] as a transfer from an outside facility on 12/22 after being found to have a bile leak at his recent cholecystectomy site.  Was diagnosed with intra-abdominal infection.  He was started on antibiotics with IV Zosyn.  He did undergo ERCP today.  Due to this patient's complication from his recent surgery, requiring IV Zosyn and plan for ERCP, it is appropriate to have him admitted to the hospital as an inpatient.      The severity of illness, intensity of service provided, expected LOS and risk for adverse outcome make the care complex, high risk and appropriate for hospital admission.        The information on this document is developed by the utilization review team in order for the business office to ensure compliance.  This only denotes the " appropriateness of proper admission status and does not reflect the quality of care rendered.         The definitions of Inpatient Status and Observation Status used in making the determination above are those provided in the CMS Coverage Manual, Chapter 1 and Chapter 6, section 70.4.      Sincerely,     Heber Porras D.O.  Utilization Review/ Case Management  [facility].

## 2020-12-23 NOTE — OR NURSING
The following page was sent to Dr. Barnard: PACU 22. Rich. Good afternoon, would you be so kind to provide Iam an intraop update via phone? My number is [phone]. Thanks! Елена AGRAWAL

## 2020-12-23 NOTE — PROGRESS NOTES
[facility]     Medicine Progress Note - Hospitalist Service, Gold 8       Date of Admission:  12/22/2020  Assessment & Plan       Iam Villanueva is a 60 year old male with recent acute necrotizing cholecystitis s/p laparoscopic cholecystectomy 12/17 admitted on 12/22/2020 as a transfer from OSH with one day history of abdominal pain c/f bile leak requiring ERCP.    Recent Acute Necrotizing Cholecystitis  S/p Laparoscopic Cholecystectomy   C/f Bile Leak  C/f Intraabdominal infection  Lap jumana performed 12/17. Subsequently discharged home without issues. Developed R-sided abdominal pain 12/21 which has been constant though intermittently increased to 10/10 pain with associated with chills and sweats. Endorses good appetite, no n/v/d, passing flatus, abdomen remains soft. On admission, afebrile, vss, WBC 11, LFTs unremarkable. Discussed with GI, will plan to perform ERCP tomorrow. Received Unasyn in the ED, will plan to broaden to Zosyn overnight for pseudomonas coverage given history of chills and sweats and recent procedure. Covid-19 negative in the ED.  - continue Zosyn 4.5 mg q6h  - NPO since midnight  - plan for ERCP today   - pain control with acetaminophen, oxycodone    PVD  [facility] On [facility]SA and statin at home but does not recall doses.   - hold ASA for procedure above  - patient will confirm statin medication    Tobacco Use Disorder  Cut down from 2 ppd to 1/2 ppd. Declined nicotine replacement at present.  - continue to encourage cessation     Diet: NPO for Medical/Clinical Reasons Except for: Meds, Ice Chips    DVT Prophylaxis: Low Risk/Ambulatory with no VTE prophylaxis indicated  Zuniga Catheter: not present  Code Status: Full Code           Disposition Plan   Expected discharge: 2 - 3 days, recommended to home once monitoring post procedure.  Entered: Qasim Spangler MD 12/23/2020, 8:29 AM       The patient's care was discussed with  the Bedside Nurse, Care Coordinator/, Patient and GI Consultant.    Qasim Spangler MD  Hospitalist Service, [address]   Contact information available via [facility] Paging/Directory  Please see sign in/sign out for up to date coverage information  ______________________________________________________________________    Interval History   Admitted overnight for abdominal pain and found to have bile leak. Admitted for procedure with GI. NPO overnight. This morning patient with moderate pain in the abdomen but no fevers, no chills, no shortness of breath, no chest pain. No recent cOVID contacts.    Otherwise 4pt ROS is negative    Data reviewed today: I reviewed all medications, new labs and imaging results over the last 24 hours. I personally reviewed no images or EKG's today.    Physical Exam   Vital Signs: Temp: 97.9  F (36.6  C) Temp src: Oral BP: 103/57 Pulse: 75   Resp: 18 SpO2: 93 % O2 Device: None (Room air)    Weight: 159 lbs 2.75 oz  Gen: NAD, sitting comfortably in bed  EOMI, conjuctiva clear  Mouth: OP clear, no lesions  CV: RRR, no murmurs, 2+ radial pulses  RESP: CTA bilaterally, no w/r/c  Abd: soft, RUQ tenderness, nondistended  Ext: no edema bilaterally    Data   Recent Labs   Lab 12/22/20  2158 12/22/20  1138 12/18/20  0449 12/17/20  0500 12/16/20  1434   WBC  --  11.0 15.4* 10.5 13.9*   HGB  --  16.0 13.8 15.5 16.3   MCV  --  95 94 94 94   PLT  --  247 160 171 200   INR 1.11  --   --   --   --    NA  --  133 137  --  135   POTASSIUM  --  4.3 4.4  --  3.5   CHLORIDE  --  101 108  --  105   CO2  --  26 26  --  27   BUN  --  17 15  --  10   CR  --  0.93 0.96  --  0.96   ANIONGAP  --  6 3  --  3   MELECIO  --  9.2 8.4*  --  8.7   GLC  --  107* 125*  --  104*   ALBUMIN  --  3.4 2.6*  --  3.6   PROTTOTAL  --  8.3 6.2*  --  7.5   BILITOTAL  --  0.9 1.4*  --  0.6   ALKPHOS  --  101 111  --  83   ALT  --  74* 169*  --  21   AST  --  31 111*  --  15    LIPASE  --  41*  --   --  53*   TROPI  --   --   --   --  <0.015

## 2020-12-23 NOTE — PLAN OF CARE
"/65 (BP Location: Right arm)   Pulse 81   Temp 98.2  F (36.8  C) (Oral)   Resp 18   Ht 1.676 m (5' 6\")   Wt 72.2 kg (159 lb 2.8 oz)   SpO2 93%   BMI 25.69 kg/m      Reason for admission: bile leak  Neuro: A&Ox4, cooperative  Cardiac: WDL, denies chest pain  Respiratory: on RA, denies SOB  GI/: urinal given to pt, LBM 12/22  Skin: bruising on abdomen, R NYDIA drain dressing CDI, 3 lap sites CDI  Pain: abd pain, PRN oxy   LDA: PIV  Activity: pt independent in room  Diet/Appetite: pt OK to eat some food - had soup and toast then NPO at midnight  Plan:  NPO at midnight, possible ERCP tomorrow, continue POC    "

## 2020-12-23 NOTE — CONSULTS
"    GASTROENTEROLOGY CONSULTATION      Date of Admission:  12/22/2020          Chief Complaint:   We were asked by Miki Dow MD of  to evaluate this patient with \"60 yom with acute necrotizing cholecystitis s/p lap jumana 12/17 p/w 1 day history abd pain, c/f bile leak. Transferred to [facility] for ERCP\".           ASSESSMENT AND RECOMMENDATIONS:   Assessment:  Iam Villanueva is a 60 year old male with a history of PVD, tobacco use disorder, recent laparoscopic cholecystectomy on 12/1720 for acute necrotizing cholecystitis who is admitted on 12/22/2020 as a transfer from OSH with one day history of abdominal pain, biliary output from NYDIA drain concerning for biliary leak.    # Biliary leak  - ERCP by Dr. 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   - Okay to discharge from GI standpoint if patient reports no pain and is able to tolerate diet.     Gastroenterology follow up recommendations: Pending clinical course.       Patient care plan discussed with Dr. Barnard, Nima Alanis MD, GI staff physician. Thank you for involving us in this patient's care. Please do not hesitate to contact the GI service with any questions or concerns.     Kathrine Bell M.D  GI fellow  [id number]  Department of Gastroenterology   -------------------------------------------------------------------------------------------------------------------   History is obtained from the patient and the medical record.          History of Present Illness:   Iam Villanueva is a 60 year old male with a history of PVD, tobacco use disorder, recent laparoscopic cholecystectomy on 12/1720 for acute necrotizing cholecystitis who is admitted on 12/22/2020 as a transfer from OSH with one day history of abdominal pain, biliary output from NYDIA drain concerning for biliary leak.  The patient " describes severe 10/10 abdominal pain, mid abdomen. He denies other symptoms of dysphagia, overt GI bleeding (hematemsis, melena, hematochezia). He describes stable drainage from NYDIA drain. He changes that 3 times a day. In the OSH, biliary output was noted.   Patient does not have history of prior abdominal surgeries or ERCP. He denies other medical comorbidities.             Past Medical History:   Reviewed and edited as appropriate  No past medical history on file.         Past Surgical History:   Reviewed and edited as appropriate   Past Surgical History:   Procedure Laterality Date     LAPAROSCOPIC CHOLECYSTECTOMY N/A 12/17/2020    Procedure: CHOLECYSTECTOMY, LAPAROSCOPIC;  Surgeon: Qasim Farah MD;  Location: [facility] OR            Previous Endoscopy:   No results found for this or any previous visit.         Social History:   Reviewed and edited as appropriate  Social History     Socioeconomic History     Marital status:      Spouse name: Not on file     Number of children: Not on file     Years of education: Not on file     Highest education level: Not on file   Occupational History     Not on file   Social Needs     Financial resource strain: Not on file     Food insecurity     Worry: Not on file     Inability: Not on file     Transportation needs     Medical: Not on file     Non-medical: Not on file   Tobacco Use     Smoking status: Light Tobacco Smoker     Types: Cigarettes     Smokeless tobacco: Never Used   Substance and Sexual Activity     Alcohol use: Not Currently     Drug use: Never     Sexual activity: Not on file   Lifestyle     Physical activity     Days per week: Not on file     Minutes per session: Not on file     Stress: Not on file   Relationships     Social connections     Talks on phone: Not on file     Gets together: Not on file     Attends Taoist service: Not on file     Active member of club or organization: Not on file     Attends meetings of clubs or organizations: Not on file      "Relationship status: Not on file     Intimate partner violence     Fear of current or ex partner: Not on file     Emotionally abused: Not on file     Physically abused: Not on file     Forced sexual activity: Not on file   Other Topics Concern     Not on file   Social History Narrative     Not on file            Family History:   Reviewed and edited as appropriate  History reviewed. No pertinent family history.        Allergies:   Reviewed and edited as appropriate   No Known Allergies         Medications:     Current Facility-Administered Medications   Medication     acetaminophen (TYLENOL) Suppository 650 mg     acetaminophen (TYLENOL) tablet 650 mg     lidocaine (LMX4) cream     lidocaine 1 % 0.1-1 mL     melatonin tablet 1 mg     naloxone (NARCAN) injection 0.2 mg    Or     naloxone (NARCAN) injection 0.4 mg    Or     naloxone (NARCAN) injection 0.2 mg    Or     naloxone (NARCAN) injection 0.4 mg     oxyCODONE (ROXICODONE) tablet 5 mg     piperacillin-tazobactam (ZOSYN) 4.5 g vial to attach to  mL bag     senna-docusate (SENOKOT-S/PERICOLACE) 8.6-50 MG per tablet 1 tablet    Or     senna-docusate (SENOKOT-S/PERICOLACE) 8.6-50 MG per tablet 2 tablet     sodium chloride (PF) 0.9% PF flush 3 mL     sodium chloride (PF) 0.9% PF flush 3 mL             Review of Systems:     A complete review of systems was performed and is negative except as noted in the HPI           Physical Exam:   /72 (BP Location: Left arm)   Pulse 69   Temp 96.8  F (36  C) (Axillary)   Resp 17   Ht 1.676 m (5' 6\")   Wt 72.2 kg (159 lb 2.8 oz)   SpO2 95%   BMI 25.69 kg/m    Wt:   Wt Readings from Last 2 Encounters:   12/22/20 72.2 kg (159 lb 2.8 oz)   12/22/20 72.6 kg (160 lb)      Constitutional: cooperative, pleasant, not dyspneic/diaphoretic, no acute distress  Eyes: Sclera anicteric/injected  Ears/nose/mouth/throat: Normal oropharynx without ulcers or exudate, mucus membranes moist, hearing intact  Neck: supple, thyroid " normal size  CV: No edema  Respiratory: Unlabored breathing  Lymph: No axillary, submandibular, supraclavicular or inguinal lymphadenopathy  Abd: Nondistended, +bs, no hepatosplenomegaly, tender, no peritoneal signs  Skin: warm, perfused, no jaundice  Neuro: AAO x 3, No asterixis  Psych: Normal affect  MSK: Normal gait         Data:   Labs and imaging below were independently reviewed and interpreted    BMP  Recent Labs   Lab 12/22/20  1138 12/18/20  0449    137   POTASSIUM 4.3 4.4   CHLORIDE 101 108   MELECIO 9.2 8.4*   CO2 26 26   BUN 17 15   CR 0.93 0.96   * 125*     CBC  Recent Labs   Lab 12/22/20  1138 12/18/20  0449 12/17/20  0500   WBC 11.0 15.4* 10.5   RBC 4.94 4.29* 4.80   HGB 16.0 13.8 15.5   HCT 46.7 40.3 45.1   MCV 95 94 94   MCH 32.4 32.2 32.3   MCHC 34.3 34.2 34.4   RDW 11.9 11.9 11.8    160 171     INR  Recent Labs   Lab 12/22/20  2158   INR 1.11     LFTs  Recent Labs   Lab 12/22/20  1138 12/18/20  0449   ALKPHOS 101 111   AST 31 111*   ALT 74* 169*   BILITOTAL 0.9 1.4*   PROTTOTAL 8.3 6.2*   ALBUMIN 3.4 2.6*      PANC  Recent Labs   Lab 12/22/20  1138   LIPASE 41*     Procedures  Impression:          - Duct of Luschka leak managed by biliary sphincterotomy                        and biliary stent placement                        - Otherwise unremarkable cholangiography with healthy                        biliary system and low inserting cystic                        - Mild ooze with sphincterotomy which resolved prior to                        completion, however superficial epinephrine was injected                        prophylactically at the sphincterotomy   Recommendation:      - General anesthesia recovery with return to the floor                        when appropriate                        - Avoid antithrombotics for at least three days                        - All other medications and diet may resume without delay                        - Discharge may be as early as this  afternoon if without                        novel symptom or suggestion of complication                        - Patient to monitor output to bulb; once less than 10cc                        or so per day, patient should reach out to Dr Farah for                        removal                        - Repeat ERCP to ensure resolution of leak and for stent                        removal in 8-10 weeks                        - The findings and recommendations were discussed with                        the patient and their family

## 2020-12-23 NOTE — OR NURSING
Dr. Barnard returned phone call and updated patient. Page sent to Dr. Barnard per daughter Padmaja's request for intraop update. Unable to reach wife at provided number, gave Dr. Barnard's Padmaja's number ([phone]).

## 2020-12-23 NOTE — PLAN OF CARE
"Shift: 2300 - 0700  VS: /55 (BP Location: Left arm)   Pulse 87   Temp 99.2  F (37.3  C) (Oral)   Resp 18   Ht 1.676 m (5' 6\")   Wt 72.2 kg (159 lb 2.8 oz)   SpO2 91%   BMI 25.69 kg/m      Neuro: A&Ox4, CMS intact  Cardiac: WDL  Resp: lung sounds clear, no SOB reported, sating well on RA  GI: passing gas, bowel sounds active, LBM 12/22  : voiding spontaneously   Skin: see skin note  Pain/Nausea: RUQ abdominal pain treated with oxy; denies nausea  LDA: R PIV SL, NYDIA to bulb sx   Diet: NPO   Mobility: up ad luis antonio  Labs: reviewed  Plan: probable ERCP, continue plan of care     "

## 2020-12-23 NOTE — ANESTHESIA PREPROCEDURE EVALUATION
"Anesthesia Pre-Procedure Evaluation    Patient: Iam Villanueva   MRN: [id number] : 1960          Preoperative Diagnosis: Acute cholecystitis [K81.0]    Procedure(s):  CHOLECYSTECTOMY, LAPAROSCOPIC    No past medical history on file.  Past Surgical History:   Procedure Laterality Date     LAPAROSCOPIC CHOLECYSTECTOMY N/A 2020    Procedure: CHOLECYSTECTOMY, LAPAROSCOPIC;  Surgeon: Qasim Farah MD;  Location: [facility] OR       Anesthesia Evaluation     . Pt has had prior anesthetic. Type: General and MAC    No history of anesthetic complications          ROS/MED HX    ENT/Pulmonary: Comment: Hypoxic- on NC O2 w/ sats in mid       Neurologic:  - neg neurologic ROS     Cardiovascular:     (+) Dyslipidemia, ----. : . . . :. dysrhythmias (hx of 25 yrs ago) a-fib, valvular problems/murmurs (pt was told approx 25 yrs ago he has a \"mild leaky valve\"- no intervention and no records available) . Previous cardiac testing date:results:date: results:ECG reviewed date:20 results:Sinus Rhythm   -Left axis -anterior fascicular block.     ABNORMAL    date: results:          METS/Exercise Tolerance:  >4 METS   Hematologic:  - neg hematologic  ROS       Musculoskeletal:  - neg musculoskeletal ROS       GI/Hepatic:     (+) hiatal hernia, cholecystitis/cholelithiasis,       Renal/Genitourinary:  - ROS Renal section negative       Endo:  - neg endo ROS       Psychiatric:  - neg psychiatric ROS       Infectious Disease:  - neg infectious disease ROS       Malignancy:      - no malignancy   Other:                          Physical Exam  Normal systems: cardiovascular and pulmonary    Airway   Mallampati: II  TM distance: >3 FB  Neck ROM: full    Dental   Comment: Edentulous top; missing teeth on bottom    Cardiovascular       Pulmonary             Lab Results   Component Value Date    WBC 11.0 2020    HGB 16.0 2020    HCT 46.7 2020     2020     2020    POTASSIUM 4.3 2020 " "   CHLORIDE 101 12/22/2020    CO2 26 12/22/2020    BUN 17 12/22/2020    CR 0.93 12/22/2020     (H) 12/22/2020    MELECIO 9.2 12/22/2020    ALBUMIN 3.4 12/22/2020    PROTTOTAL 8.3 12/22/2020    ALT 74 (H) 12/22/2020    AST 31 12/22/2020    ALKPHOS 101 12/22/2020    BILITOTAL 0.9 12/22/2020    LIPASE 41 (L) 12/22/2020    INR 1.11 12/22/2020       Preop Vitals  BP Readings from Last 3 Encounters:   12/23/20 114/60   12/22/20 106/71   12/22/20 104/68    Pulse Readings from Last 3 Encounters:   12/23/20 65   12/22/20 77   12/22/20 77      Resp Readings from Last 3 Encounters:   12/23/20 18   12/22/20 16   12/18/20 16    SpO2 Readings from Last 3 Encounters:   12/23/20 92%   12/22/20 93%   12/18/20 91%      Temp Readings from Last 1 Encounters:   12/23/20 37.2  C (98.9  F) (Oral)    Ht Readings from Last 1 Encounters:   12/22/20 1.676 m (5' 6\")      Wt Readings from Last 1 Encounters:   12/22/20 72.2 kg (159 lb 2.8 oz)    Estimated body mass index is 25.69 kg/m  as calculated from the following:    Height as of 12/22/20: 1.676 m (5' 6\").    Weight as of 12/22/20: 72.2 kg (159 lb 2.8 oz).       Anesthesia Plan      History & Physical Review  History and physical reviewed and following examination; no interval change.    ASA Status:  2 .    NPO Status:  > 8 hours    Plan for General with Intravenous and Propofol induction. Maintenance will be Balanced.    PONV prophylaxis:  Ondansetron (or other 5HT-3) and Dexamethasone or Solumedrol    The patient is not a current smoker      Postoperative Care  Postoperative pain management:  IV analgesics, Oral pain medications and Multi-modal analgesia.      Consents  Anesthetic plan, risks, benefits and alternatives discussed with:  Patient.  Use of blood products discussed: No .   .                 Júnior Hall MD        PHYSICAL EXAM:   Mental Status/Neuro: A/A/O   Airway: Facies: Feasible  Mallampati: I  Mouth/Opening: Full  TM distance: > 6 cm  Neck ROM: Full "   Respiratory: Auscultation: CTAB     Resp. Rate: Normal     Resp. Effort: Normal      CV: Rhythm: Regular  Rate: Age appropriate  Heart: Normal Sounds  Edema: None   Comments:      Dental: Normal Dentition                Assessment:   ASA SCORE: 2    H&P: History and physical reviewed and following examination; no interval change.         Plan:   Anes. Type:  General   Pre-Medication: None   Induction:  IV (Standard)   Airway: ETT; Oral   Access/Monitoring: PIV; 2nd PIV   Maintenance: Balanced     Postop Plan:   Postop Pain: Opioids  Postop Sedation/Airway: Not planned  Disposition: Outpatient     PONV Management:   Adult Risk Factors:, Postop Opioids   Prevention: Ondansetron     CONSENT: Direct conversation   Plan and risks discussed with: Patient   Blood Products: Consent Deferred (Minimal Blood Loss)

## 2020-12-23 NOTE — ANESTHESIA PROCEDURE NOTES
Airway   Date/Time: 12/23/2020 11:33 AM   Patient location during procedure: OR    Staff -   Performed By: resident    Indications and Patient Condition  Indications for airway management: yifan-procedural  Mask difficulty assessment: 1 - vent by mask    Final Airway Details  Final airway type: endotracheal airway  Successful airway:ETT - single  Endotracheal Airway Details   ETT size (mm): 8.0  Cuffed: yes  Successful intubation technique: direct laryngoscopy  Grade View of Cords: 2  Adjucts: stylet  Measured from: gums/teeth  Secured at (cm): 22  Secured with: commercial tube parra  Bite block used: None    Post intubation assessment   Placement verified by: capnometry, equal breath sounds and chest rise   Number of attempts at approach: 1  Number of other approaches attempted: 0  Secured with:commercial tube parra  Ease of procedure: easy  Dentition: Intact and Unchanged

## 2020-12-23 NOTE — H&P
[facility]     History and Physical - [facility]        Date of Admission:  12/22/2020    Assessment & Plan   Iam Villanueva is a 60 year old male with recent acute necrotizing cholecystitis s/p laparoscopic cholecystectomy 12/17 admitted on 12/22/2020 as a transfer from OSH with one day history of abdominal pain c/f bile leak requiring ERCP.    Recent Acute Necrotizing Cholecystitis  S/p Laparoscopic Cholecystectomy   C/f Bile Leak  C/f Intraabdominal infection  Lap jumana performed 12/17. Subsequently discharged home without issues. Developed R-sided abdominal pain 12/21 which has been constant though intermittently increased to 10/10 pain with associated with chills and sweats. Endorses good appetite, no n/v/d, passing flatus, abdomen remains soft. On admission, afebrile, vss, WBC 11, LFTs unremarkable. Discussed with GI, will plan to perform ERCP tomorrow. Received Unasyn in the ED, will plan to broaden to Zosyn overnight for pseudomonas coverage given history of chills and sweats and recent procedure. Covid-19 negative in the ED.  - start Zosyn 4.5 mg q6h  - NPO at midnight  - plan for ERCP tomorrow  - check INR, goal <1.6 for procedure  - pain control with acetaminophen, oxycodone    PVD  [facility]. On ASA and statin at home but does not recall doses.   - hold ASA for procedure above  - patient will confirm statin medication    Tobacco Use Disorder  Cut down from 2 ppd to 1/2 ppd. Declined nicotine replacement at present.  - continue to encourage cessation       Diet: NPO for Medical/Clinical Reasons Except for: Meds, Ice Chips  2 Gram Sodium Diet    Fluids: none  DVT Prophylaxis: Pneumatic Compression Devices  Zuniga Catheter: not present  Code Status: Full Code      Disposition Plan   Expected discharge: 2 - 3 days, recommended to prior living arrangement once suspected bile leak addressed.  Entered: Miki Dow MD  12/22/2020, 10:07 PM       The patient was staffed with Dr. Estela Vuong.    Miki Dow MD   PGY-1, Internal Medicine  [facility]   Please see sign in/sign out for up to date coverage information    ______________________________________________________________________    Chief Complaint   Abdominal pain    History is obtained from the patient    History of Present Illness   Iam Villanueva is a 60 year old male with history of PVD, tobacco use disorder, and recent acute necrotizing cholecystitis s/p laparoscopic cholecystectomy 12/17 admitted on 12/22/2020 as a transfer from OSH with one day history of abdominal pain c/f bile leak requiring ERCP.    Patient reports he was doing well after his lap jumana 12/17 until yesterday when he developed sudden onset R sided abdominal pain. The pain is localized to around the site of his NYDIA drain which has remained in place since his lap jumana. The pain is constant though intermittently becomes 10/10 with associated chills and sweating. Otherwise endorses good appetite, no n/v/d, passing flatus, abdomen remains soft. No recorded fevers. He has been taking the Norco he was prescribed post-operatively for pain control with some relief. His NYDIA drain has been putting out approximately 150 ml/day (1.5 drains) of dark brown/green fluid. No pus or blood.     Patient has history of PVD which [facility]. He takes ASA and a cholesterol pill. No known cardiac, pulmonary, hepatic conditions.     ED Course:   Received Unasyn and Dilaudid    Review of Systems    The 10 point Review of Systems is negative other than noted in the HPI or here.     Past Medical History    PVD  Tobacco Use Disorder    Past Surgical History   Laparoscopic Cholecystectomy 12/17    Social History   Tobacco: 1/2 ppd, cut down from 2 ppd previously   Alcohol: 1 drink per week  Drug: remote in 1970s, none since    Family History    Non contributory    Prior to Admission Medications   Prior to Admission Medications   Prescriptions Last Dose Informant Patient Reported? Taking?   HYDROcodone-acetaminophen (NORCO) 5-325 MG tablet  Self No No   Sig: Take 1-2 tablets by mouth every 6 hours as needed for pain      Facility-Administered Medications: None     Allergies   No Known Allergies    Physical Exam   Vital Signs: Temp: 98.2  F (36.8  C) Temp src: Oral BP: 137/65 Pulse: 81   Resp: 18 SpO2: 93 % O2 Device: None (Room air)    Weight: 159 lbs 2.75 oz    Gen: well-appearing, lying in bed, in NAD  CV: RRR, normal S1 and S2  Pulm: CTAB, no increased WOB on RA  GI: BS+, soft, ND. Tender to palpation from the umbilicus towards the R NYDIA drain site. NYDIA drain in place with brown/green fluid draining. No pus or blood. No surrounding erythema.   Skin: well healing lap port sites on abdomen  : no suprapubic tenderness  Neuro: alert, conversant, responds to questions appropriately  Ext: no bilateral peripheral extremity edema  Psych: normal affect      Data   Data reviewed today: I reviewed all medications, new labs and imaging results over the last 24 hours.     Recent Labs   Lab 12/22/20  1138 12/18/20  0449 12/17/20  0500 12/16/20  1434   WBC 11.0 15.4* 10.5 13.9*   HGB 16.0 13.8 15.5 16.3   MCV 95 94 94 94    160 171 200    137  --  135   POTASSIUM 4.3 4.4  --  3.5   CHLORIDE 101 108  --  105   CO2 26 26  --  27   BUN 17 15  --  10   CR 0.93 0.96  --  0.96   ANIONGAP 6 3  --  3   MELECIO 9.2 8.4*  --  8.7   * 125*  --  104*   ALBUMIN 3.4 2.6*  --  3.6   PROTTOTAL 8.3 6.2*  --  7.5   BILITOTAL 0.9 1.4*  --  0.6   ALKPHOS 101 111  --  83   ALT 74* 169*  --  21   AST 31 111*  --  15   LIPASE 41*  --   --  53*   TROPI  --   --   --  <0.015

## 2020-12-23 NOTE — OP NOTE
ERCP 12/23/2020 11:23 AM [facility], [address] [place] [address] [phone]     Endoscopy Department   _______________________________________________________________________________   Patient Name: Iam Villanueva           Procedure Date: 12/23/2020 11:23 AM   MRN: [id number]                       Account Number: [id number]   YOB: 1960              Admit Type: Inpatient   Age: 60                                Gender: Male   Note Status: Finalized                Attending MD: Nima Barnard MD   Total Sedation Time:                     _______________________________________________________________________________       Procedure:           ERCP   Indications:         Treatment of bile leak   Providers:           Nima Barnard MD   Patient Profile:     Mr Villanueva is a 59yo gentleman status post a                        cholecystectomy a few days back who now presents with                        evidence of bile leak per NYDIA drain for management by                        ERCP.   Referring MD:        Qasim Farah MD   Medicines:           General Anesthesia, Indomethacin 100 mg DE, Antibiotics                        as scheduled   Complications:       No immediate complications.   _______________________________________________________________________________   Procedure:           Pre-Anesthesia Assessment:                        - Prior to the procedure, a History and Physical was                        performed, and patient medications and allergies were                        reviewed. The patient is competent. The risks and                        benefits of the procedure and the sedation options and                        risks were discussed with the patient. All questions                        were answered and informed consent was obtained. Patient                        identification and proposed procedure were verified by                         the nurse in the pre-procedure area. Mental Status                        Examination: alert and oriented. Airway Examination:                        Mallampati Class II (the uvula but not tonsillar pillars                        visualized). Respiratory Examination: clear to                        auscultation. CV Examination: normal. ASA Grade                        Assessment: II - A patient with mild systemic disease.                        After reviewing the risks and benefits, the patient was                        deemed in satisfactory condition to undergo the                        procedure. The anesthesia plan was to use general                        anesthesia. Immediately prior to administration of                        medications, the patient was re-assessed for adequacy to                        receive sedatives. The heart rate, respiratory rate,                        oxygen saturations, blood pressure, adequacy of                        pulmonary ventilation, and response to care were                        monitored throughout the procedure. The physical status                        of the patient was re-assessed after the procedure.                        After obtaining informed consent, the scope was passed                        under direct vision. Throughout the procedure, the                        patient's blood pressure, pulse, and oxygen saturations                        were monitored continuously. The duodensocope was                        introduced through the mouth, and used to inject                        contrast into and used to inject contrast into the bile                        duct. The ERCP was accomplished without difficulty. The                        patient tolerated the procedure well.                                                                                     Findings:        A  film of the right upper quadrant was unremarkable save for  "the        drain. Limited white light imaging of the foregut revealed a normal        ampulla without lesio. The bile duct was selectively deeply cannulated        with the short-nosed traction sphincterotome in concert with an short        0.025\" Visiglide wire. Contrast was injected and I personally        interpreted the bile duct images. Ductal flow of contrast was adequate,        image quality was excellent and contrast extended throughout the        intahepatics. Extravasation of contrast was found originating from a        right intrahepatic branch. No other sites of extravasation were found        including interrogation of the cystic. The intrahepatics were otherwise        healthy appearing and decompressed as were the bifurcation and common.        The cystic is low inserting. A 6 mm biliary sphincterotomy was made with        a monofilament traction (standard) sphincterotome using ERBE        electrocautery. Minor bleeding from the sphincterotomy was successfully        treated with time and later with prophylactic superficial epinephrine        injection. To discover objects, the biliary tree was swept with a 12 mm        balloon starting at the bifurcation. Nothing but bile and contrast were        found. A 10 Fr by 7 cm SF stent with a single external flap and a single        internal flap was placed 6.5 cm into the common bile duct. Bile flowed        through the stent and the stent was in good position. The ventral        pancreatic duct and orifice were selectively not interrogated.                                                                                     Impression:          - Duct of Luschka leak managed by biliary sphincterotomy                        and biliary stent placement                        - Otherwise unremarkable cholangiography with healthy                        biliary system and low inserting cystic                        - Mild ooze with sphincterotomy which resolved " prior to                        completion, however superficial epinephrine was injected                        prophylactically at the sphincterotomy   Recommendation:      - General anesthesia recovery with return to the floor                        when appropriate                        - Avoid antithrombotics for at least three days                        - All other medications and diet may resume without delay                        - Discharge may be as early as this afternoon if without                        novel symptom or suggestion of complication                        - Patient to monitor output to bulb; once less than 10cc                        or so per day, patient should reach out to Dr Farah for                        removal                        - Repeat ERCP to ensure resolution of leak and for stent                        removal in 8-10 weeks                        - The findings and recommendations were discussed with                        the patient and their family                                                                                       electronically signed by KARLY Barnard

## 2020-12-23 NOTE — ANESTHESIA CARE TRANSFER NOTE
Patient: Iam Villanueva    Procedure(s):  ENDOSCOPIC RETROGRADE CHOLANGIOPANCREATOGRAPHY, biliary sphincterotomy and stent placement,    Diagnosis: Bile leak [K83.9]  Diagnosis Additional Information: No value filed.    Anesthesia Type:   General     Note:  Airway :Nasal Cannula  Patient transferred to:PACU  Comments: Patient transferred to PACU in stable condition, breathing spontaneously on NC, VSS.  Report given to RN.Handoff Report: Identifed the Patient, Identified the Reponsible Provider, Reviewed the pertinent medical history, Discussed the surgical course, Reviewed Intra-OP anesthesia mangement and issues during anesthesia, Set expectations for post-procedure period and Allowed opportunity for questions and acknowledgement of understanding      Vitals: (Last set prior to Anesthesia Care Transfer)    CRNA VITALS  12/23/2020 1202 - 12/23/2020 1236      12/23/2020             NIBP:  134/70    NIBP Mean:  86    Resp Rate (observed):  (!) 1                Electronically Signed By: BRIAN Thomason CRNA  December 23, 2020  12:36 PM

## 2020-12-23 NOTE — PLAN OF CARE
Pt received to floor from PACU at 1400. VSS, sating 94% on 1L NC. Pt denies pain and nausea. NYDIA with minimal output. 3 LS's from previous procedure intact with slight erythema. Pt tolerating sips of fluid, regular diet.

## 2020-12-23 NOTE — ANESTHESIA POSTPROCEDURE EVALUATION
Anesthesia POST Procedure Evaluation    Patient: Iam Villanueva   MRN:     [id number] Gender:   male   Age:    60 year old :      1960        Preoperative Diagnosis: Bile leak [K83.9]   Procedure(s):  ENDOSCOPIC RETROGRADE CHOLANGIOPANCREATOGRAPHY, biliary sphincterotomy and stent placement,   Postop Comments: No value filed.     Anesthesia Type: General       Disposition: Outpatient   Postop Pain Control: Uneventful            Sign Out: Well controlled pain   PONV: No   Neuro/Psych: Uneventful            Sign Out: Acceptable/Baseline neuro status   Airway/Respiratory: Uneventful            Sign Out: Acceptable/Baseline resp. status   CV/Hemodynamics: Uneventful            Sign Out: Acceptable CV status   Other NRE: NONE   DID A NON-ROUTINE EVENT OCCUR? No         Last Anesthesia Record Vitals:  CRNA VITALS  2020 1202 - 2020 1256      2020             NIBP:  122/68    Pulse:  81    NIBP Mean:  93    Temp:  36.6  C (97.9  F)    SpO2:  92 %    Resp Rate (observed):  19    EKG:  Sinus rhythm          Last PACU Vitals:  Vitals Value Taken Time   /71 20 1250   Temp 36.7  C (98  F) 20 1236   Pulse 69 20 1255   Resp 17 20 1236   SpO2 92 % 20 1255   Temp src     NIBP 122/68 20 1237   Pulse 81 20 1237   SpO2 92 % 20 1237   Resp     Temp 36.6  C (97.9  F) 20 1237   Ht Rate     Temp 2     Vitals shown include unvalidated device data.      Electronically Signed By: Júnior Hall MD, 2020, 12:56 PM

## 2020-12-24 VITALS
SYSTOLIC BLOOD PRESSURE: 121 MMHG | WEIGHT: 163.5 LBS | DIASTOLIC BLOOD PRESSURE: 65 MMHG | BODY MASS INDEX: 26.28 KG/M2 | TEMPERATURE: 96.9 F | OXYGEN SATURATION: 93 % | RESPIRATION RATE: 16 BRPM | HEIGHT: 66 IN | HEART RATE: 81 BPM

## 2020-12-24 LAB
ALBUMIN SERPL-MCNC: 2.9 G/DL (ref 3.4–5)
ALP SERPL-CCNC: 85 U/L (ref 40–150)
ALT SERPL W P-5'-P-CCNC: 43 U/L (ref 0–70)
ANION GAP SERPL CALCULATED.3IONS-SCNC: 6 MMOL/L (ref 3–14)
AST SERPL W P-5'-P-CCNC: 22 U/L (ref 0–45)
BILIRUB SERPL-MCNC: 0.6 MG/DL (ref 0.2–1.3)
BUN SERPL-MCNC: 17 MG/DL (ref 7–30)
CALCIUM SERPL-MCNC: 9.1 MG/DL (ref 8.5–10.1)
CHLORIDE SERPL-SCNC: 107 MMOL/L (ref 94–109)
CO2 SERPL-SCNC: 23 MMOL/L (ref 20–32)
CREAT SERPL-MCNC: 1.01 MG/DL (ref 0.66–1.25)
ERCP: NORMAL
ERYTHROCYTE [DISTWIDTH] IN BLOOD BY AUTOMATED COUNT: 11.7 % (ref 10–15)
GFR SERPL CREATININE-BSD FRML MDRD: 81 ML/MIN/{1.73_M2}
GLUCOSE SERPL-MCNC: 102 MG/DL (ref 70–99)
HCT VFR BLD AUTO: 42.9 % (ref 40–53)
HGB BLD-MCNC: 14.5 G/DL (ref 13.3–17.7)
INR PPP: 1.07 (ref 0.86–1.14)
MCH RBC QN AUTO: 31.8 PG (ref 26.5–33)
MCHC RBC AUTO-ENTMCNC: 33.8 G/DL (ref 31.5–36.5)
MCV RBC AUTO: 94 FL (ref 78–100)
PLATELET # BLD AUTO: 282 10E9/L (ref 150–450)
POTASSIUM SERPL-SCNC: 4.1 MMOL/L (ref 3.4–5.3)
PROT SERPL-MCNC: 7.3 G/DL (ref 6.8–8.8)
RBC # BLD AUTO: 4.56 10E12/L (ref 4.4–5.9)
SODIUM SERPL-SCNC: 136 MMOL/L (ref 133–144)
WBC # BLD AUTO: 11.5 10E9/L (ref 4–11)

## 2020-12-24 PROCEDURE — 80053 COMPREHEN METABOLIC PANEL: CPT | Performed by: STUDENT IN AN ORGANIZED HEALTH CARE EDUCATION/TRAINING PROGRAM

## 2020-12-24 PROCEDURE — 85027 COMPLETE CBC AUTOMATED: CPT | Performed by: STUDENT IN AN ORGANIZED HEALTH CARE EDUCATION/TRAINING PROGRAM

## 2020-12-24 PROCEDURE — 85610 PROTHROMBIN TIME: CPT | Performed by: STUDENT IN AN ORGANIZED HEALTH CARE EDUCATION/TRAINING PROGRAM

## 2020-12-24 PROCEDURE — 36415 COLL VENOUS BLD VENIPUNCTURE: CPT | Performed by: STUDENT IN AN ORGANIZED HEALTH CARE EDUCATION/TRAINING PROGRAM

## 2020-12-24 PROCEDURE — 250N000011 HC RX IP 250 OP 636: Performed by: STUDENT IN AN ORGANIZED HEALTH CARE EDUCATION/TRAINING PROGRAM

## 2020-12-24 PROCEDURE — 99238 HOSP IP/OBS DSCHRG MGMT 30/<: CPT | Performed by: INTERNAL MEDICINE

## 2020-12-24 RX ADMIN — PIPERACILLIN AND TAZOBACTAM 4.5 G: 4; .5 INJECTION, POWDER, FOR SOLUTION INTRAVENOUS at 04:12

## 2020-12-24 ASSESSMENT — ACTIVITIES OF DAILY LIVING (ADL)
ADLS_ACUITY_SCORE: 16

## 2020-12-24 NOTE — PLAN OF CARE
"/61 (BP Location: Left arm)   Pulse 77   Temp 96.8  F (36  C) (Axillary)   Resp 20   Ht 1.676 m (5' 6\")   Wt 74.2 kg (163 lb 8 oz)   SpO2 92%   BMI 26.39 kg/m      Reason for admission: POD#0 ERCP with stent placement   Neuro: A&Ox4, cooperative, denies numbness/tingling  Cardiac: WDL, denies chest pain  Respiratory: on 1L NC satting 90-92%, continue to wean down, denies SOB  GI/: voiding adequately via urinal/into toilet. Bowel sounds hypo, LBM 12/22  Skin: 3 lap sites from previous procedure, NYDIA drain  Pain: pt c/o dull pain on R side of abdomen - denies pain medications  LDA: PIV running TKO with intermittent abx, R NYDIA drain  Activity: SBA at most, pt mostly independent   Diet/Appetite: reg diet, tolerating well, denies N/V  Plan: probable discharge tomorrow, continue POC    "

## 2020-12-24 NOTE — PLAN OF CARE
VSS. Incisions intact. NYDIA on R abdomen with scant output. Sating well on RA. Abdomen soft and nontender.  Voiding and +BM.     Discharged to lobby at 1025.

## 2020-12-24 NOTE — DISCHARGE SUMMARY
[facility]   Hospitalist Discharge Summary      Date of Admission:  12/22/2020  Date of Discharge:  12/24/2020 10:40 AM  Discharging Provider: Qasim Spangler MD  Discharge Team: Hospitalist Service, Gold 8    Discharge Diagnoses   Bile leak  Post op hypoxia    Follow-ups Needed After Discharge   Follow-up Appointments     Adult [facility] Follow-up and recommended labs and tests      Follow up with primary care provider, Catherine Johnson, within 7 days for   hospital follow- up.  No follow up labs or test are needed.    Follow up with GI , in 8 weeks, they will contact you for bile stent   removal    Appointments on [place] and/or [facility] (with [facility] or [facility]   provider or service). Call [phone] if you haven't heard regarding   these appointments within 7 days of discharge.             Unresulted Labs Ordered in the Past 30 Days of this Admission     No orders found from 11/22/2020 to 12/23/2020.      These results will be followed up by NA    Discharge Disposition   Discharged to home  Condition at discharge: Stable    Hospital Course         Iam Villanueva is a 60 year old male with recent acute necrotizing cholecystitis s/p laparoscopic cholecystectomy 12/17 admitted on 12/22/2020 as a transfer from OSH with one day history of abdominal pain c/f bile leak requiring ERCP.    Recent Acute Necrotizing Cholecystitis  S/p Laparoscopic Cholecystectomy   C/f Bile Leak  C/f Intraabdominal infection  Lap jumana performed 12/17. Subsequently discharged home without issues. Developed R-sided abdominal pain 12/21 which has been constant though intermittently increased to 10/10 pain with associated with chills and sweats. Endorses good appetite, no n/v/d, passing flatus, abdomen remains soft. On admission, afebrile, vss, WBC 11, LFTs unremarkable. Discussed with GI, will plan to perform ERCP tomorrow. Received Unasyn in the ED, will plan to broaden to Zosyn overnight  for pseudomonas coverage given history of chills and sweats and recent procedure. Covid-19 negative in the ED.  - ERCP done with GI and stent placed, will need removal in 8 weeks  - No need to continue antibiotics  - oxycodone prescribed for pain controol    PVD  [facility] On ASA and statin at home but does not recall doses.   - hold ASA for procedure above, can restart 3 days after the procedure    Tobacco Use Disorder  Cut down from 2 ppd to 1/2 ppd. Declined nicotine replacement at present.  - continue to encourage cessation    Consultations This Hospital Stay   VASCULAR ACCESS CARE ADULT IP CONSULT  GI PANCREATICOBILIARY ADULT IP CONSULT    Code Status   Full Code    Time Spent on this Encounter   I, Qasim Spangler MD, personally saw the patient today and spent less than or equal to 30 minutes discharging this patient.       Qasim Spangler MD  [facility] [address] [address]  Phone: [phone]  ______________________________________________________________________    Physical Exam   Vital Signs: Temp: 96.9  F (36.1  C) Temp src: Oral BP: 121/65 Pulse: 81   Resp: 16 SpO2: 93 % O2 Device: None (Room air) Oxygen Delivery: 1 LPM  Weight: 163 lbs 8 oz  Gen: NAD, sitting comfortably in bed  Eyes: PERRLA, EOMI, conjuctiva clear  Mouth: OP clear, no lesions  CV: RRR, no murmurs, 2+ radial pulses  RESP: CTA bilaterally, no w/r/c  Abd: soft, nontender, nondistended, drain in place with scant drainage  Ext: no edema bilaterally  Neuro: CNII-CNXII intact        Primary Care Physician   Catherine Johnson    Discharge Orders      Reason for your hospital stay    You were admitted for ERCP for bile leakage. You will be able to go home today.     Discharge Instructions    Hold aspirin for 3 days                       - Patient to monitor output to bulb; once less than 10cc                        or so per day, patient should reach out to Dr Farah for                         removal                        - Repeat ERCP to ensure resolution of leak and for stent                        removal in 8-10 weeks     Activity    Your activity upon discharge: activity as tolerated     Adult [facility] Follow-up and recommended labs and tests    Follow up with primary care provider, Catherine Johnson, within 7 days for hospital follow- up.  No follow up labs or test are needed.    Follow up with GI , in 8 weeks, they will contact you for bile stent removal    Appointments on [place] and/or [facility] (with [facility] or [facility] provider or service). Call [phone] if you haven't heard regarding these appointments within 7 days of discharge.     Diet    Follow this diet upon discharge: General diet       Significant Results and Procedures   Most Recent 3 CBC's:  Recent Labs   Lab Test 12/24/20  0723 12/22/20  1138 12/18/20  0449   WBC 11.5* 11.0 15.4*   HGB 14.5 16.0 13.8   MCV 94 95 94    247 160     Most Recent 3 BMP's:  Recent Labs   Lab Test 12/24/20  0723 12/22/20  1138 12/18/20  0449    133 137   POTASSIUM 4.1 4.3 4.4   CHLORIDE 107 101 108   CO2 23 26 26   BUN 17 17 15   CR 1.01 0.93 0.96   ANIONGAP 6 6 3   MELECIO 9.1 9.2 8.4*   * 107* 125*     Most Recent 2 LFT's:  Recent Labs   Lab Test 12/24/20  0723 12/22/20  1138   AST 22 31   ALT 43 74*   ALKPHOS 85 101   BILITOTAL 0.6 0.9     Most Recent 3 INR's:  Recent Labs   Lab Test 12/24/20  0723 12/22/20  2158   INR 1.07 1.11   ,   Results for orders placed or performed during the hospital encounter of 12/22/20   XR Surgery RUFINA L/T 5 Min Fluoro w Stills    Narrative    This exam was marked as non-reportable because it will not be read by a   radiologist or a [place] non-radiologist provider.               Discharge Medications   Current Discharge Medication List      CONTINUE these medications which have CHANGED    Details   HYDROcodone-acetaminophen (NORCO) 5-325 MG tablet Take 1-2 tablets by mouth every 6  hours as needed for pain  Qty: 10 tablet, Refills: 0    Associated Diagnoses: Postoperative pain           Allergies   No Known Allergies

## 2020-12-24 NOTE — PROGRESS NOTES
VSS on 0-1L NC, NSR and normotensive. Up ad luis antonio, denies pain and nausea, independent w/ NYDIA care, eager for discharge.

## 2020-12-24 NOTE — PROGRESS NOTES
"CLINICAL NUTRITION SERVICES - ASSESSMENT NOTE     Nutrition Prescription    RECOMMENDATIONS FOR MDs/PROVIDERS TO ORDER:  None at this time    Malnutrition Status:    Unable to determine without NFPE    Recommendations already ordered by Registered Dietitian (RD):  None as patient and physician reported discharge today.        REASON FOR ASSESSMENT  Iam Villanueva is a/an 60 year old male assessed by the dietitian for Admission Nutrition Risk Screen for positive (unsure if he has lost weight and decreased appetite)     CLINICAL HISTORY   PVD, tobacco use disorder, recent laparoscopic cholecystectomy on 12/1720 for acute necrotizing cholecystitis who is admitted on 12/22/2020 as a transfer from OSH with one day history of abdominal pain, biliary output from NYDIA drain concerning for biliary leak.    NUTRITION HISTORY  Patient is discharging today- he denied any nutrition questions or concerns. Reported his appetite is back and does not think he has lost weight.     CURRENT NUTRITION ORDERS  Diet: Regular  Intake/Tolerance: %     LABS  Labs reviewed    MEDICATIONS  Medications reviewed    ANTHROPOMETRICS  Height: 167.6 cm (5' 6\")  Most Recent Weight: 74.2 kg (163 lb 8 oz)    IBW: 64.5 kg  BMI: Overweight BMI 25-29.9  Weight History:   Wt Readings from Last 15 Encounters:   12/23/20 74.2 kg (163 lb 8 oz)   12/22/20 72.6 kg (160 lb)   12/22/20 74.2 kg (163 lb 9.3 oz)   12/16/20 74.2 kg (163 lb 9.3 oz)     Dosing Weight: 72.2 kg    ASSESSED NUTRITION NEEDS  Estimated Energy Needs: 1863-4940 kcals/day (25 - 30 kcals/kg)  Justification: Maintenance  Estimated Protein Needs:  grams protein/day (1.2 - 1.4 grams of pro/kg)  Justification: Increased needs during hospitalization/acute illness    Estimated Fluid Needs: 2328-6382 mL/day (25 - 30 mL/kg)   Justification: Maintenance and Per provider pending fluid status    PHYSICAL FINDINGS  See malnutrition section below.    MALNUTRITION  % Intake: Decreased intake does " not meet criteria  % Weight Loss: None noted  Subcutaneous Fat Loss: Unable to assess  Muscle Loss: Unable to assess  Fluid Accumulation/Edema: None noted  Malnutrition Diagnosis: Unable to determine due to inability to complete all parameters of malnutriton    NUTRITION DIAGNOSIS  No nutrition diagnosis at this time related to patient discharging     INTERVENTIONS  Implementation  Nutrition Education: RD role in care- patient ability to schedule appointment with outpatient RD if other questions or concerns arise after discharge    Collaboration with other providers - paged nurse and Primary team regarding any patient education needs- none at this time     Goals  Patient to consume % of nutritionally adequate meal trays TID, or the equivalent with supplements/snacks.     Monitoring/Evaluation  Progress toward goals will be monitored and evaluated per protocol.    Asya Owens RD, AIDA  6B pager: [phone]

## 2020-12-26 ENCOUNTER — PATIENT OUTREACH (OUTPATIENT)
Dept: CARE COORDINATION | Facility: CLINIC | Age: 60
End: 2020-12-26

## 2020-12-28 NOTE — PROGRESS NOTES
[facility]: Post-Discharge Note  SITUATION                                                      Admission:    Admission Date: 12/22/20   Reason for Admission: Bile leak  Discharge:   Discharge Date: 12/24/20  Discharge Diagnosis: Bile leak    BACKGROUND                                                      Iam Villanueva is a 60 year old male with recent acute necrotizing cholecystitis s/p laparoscopic cholecystectomy 12/17 admitted on 12/22/2020 as a transfer from [facility] with one day history of abdominal pain c/f bile leak requiring ERCP.    ASSESSMENT      Discharge Assessment  Patient reports symptoms are: Improved  Does the patient have all of their medications?: Yes  Does patient know what their new medications are for?: Yes  Does patient have a follow-up appointment scheduled?: Yes  Does patient have any other questions or concerns?: No    Post-op  Did the patient have surgery or a procedure: Yes  Incision: healing  Drainage: No  Bleeding: none  Fever: No  Chills: No  Redness: No  Warmth: No  Swelling: No  Incision site pain: No  Eating & Drinking: eating and drinking without complaints/concerns  PO Intake: regular diet  Bowel Function: normal  Urinary Status: voiding without complaint/concerns        PLAN                                                      Outpatient Plan:  Follow-up Appointments     Adult [facility] Follow-up and recommended labs and tests      Follow up with primary care provider, Catherine Johnson, within 7 days for   hospital follow- up.  No follow up labs or test are needed.    Follow up with GI , in 8 weeks, they will contact you for bile stent   removal     Appointments on [place] and/or [facility] (with [facility] or [facility]   provider or service). Call [phone] if you haven't heard regarding   these appointments within 7 days of discharge.    Future Appointments   Date Time Provider Department Center   12/30/2020  9:15 AM Qasim Farah MD [facility]            Martha Gregory, CMA

## 2020-12-29 ENCOUNTER — CARE COORDINATION (OUTPATIENT)
Dept: GASTROENTEROLOGY | Facility: CLINIC | Age: 60
End: 2020-12-29

## 2020-12-29 ENCOUNTER — PREP FOR PROCEDURE (OUTPATIENT)
Dept: GASTROENTEROLOGY | Facility: CLINIC | Age: 60
End: 2020-12-29

## 2020-12-29 DIAGNOSIS — K83.9 BILE LEAK: Primary | ICD-10-CM

## 2020-12-29 NOTE — PROGRESS NOTES
Post ERCP (2020) with Dr. Barnard: Follow-up    Post procedure recommendations:   Repeat ERCP to ensure resolution of leak and for stent  removal in 8-10 weeks     Orders placed:   Please assist in scheduling:     Procedure/Imaging/Clinic: ERCP  Physician: Dr. Barnard  Timin-10 weeks  Procedure length:   Anesthesia:75 min  Dx: bile leak  Tier:2  Location: [facility]    Case request placed, noted in discharge summary.    ML

## 2020-12-30 ENCOUNTER — OFFICE VISIT (OUTPATIENT)
Dept: SURGERY | Facility: CLINIC | Age: 60
End: 2020-12-30
Payer: COMMERCIAL

## 2020-12-30 VITALS
HEIGHT: 66 IN | WEIGHT: 160 LBS | BODY MASS INDEX: 25.71 KG/M2 | TEMPERATURE: 97.2 F | DIASTOLIC BLOOD PRESSURE: 66 MMHG | HEART RATE: 76 BPM | SYSTOLIC BLOOD PRESSURE: 107 MMHG | RESPIRATION RATE: 12 BRPM

## 2020-12-30 DIAGNOSIS — G89.18 POSTOPERATIVE PAIN: Primary | ICD-10-CM

## 2020-12-30 DIAGNOSIS — Z09 POSTOP CHECK: ICD-10-CM

## 2020-12-30 PROCEDURE — 99024 POSTOP FOLLOW-UP VISIT: CPT | Performed by: SURGERY

## 2020-12-30 RX ORDER — HYDROCODONE BITARTRATE AND ACETAMINOPHEN 5; 325 MG/1; MG/1
1-2 TABLET ORAL EVERY 6 HOURS PRN
Qty: 20 TABLET | Refills: 0 | Status: SHIPPED | OUTPATIENT
Start: 2020-12-30 | End: 2021-02-12

## 2020-12-30 ASSESSMENT — MIFFLIN-ST. JEOR: SCORE: 1483.51

## 2020-12-30 NOTE — NURSING NOTE
"Initial /66 (BP Location: Right arm, Patient Position: Sitting, Cuff Size: Adult Regular)   Pulse 76   Temp 97.2  F (36.2  C) (Tympanic)   Resp 12   Ht 1.676 m (5' 6\")   Wt 72.6 kg (160 lb)   BMI 25.82 kg/m   Estimated body mass index is 25.82 kg/m  as calculated from the following:    Height as of this encounter: 1.676 m (5' 6\").    Weight as of this encounter: 72.6 kg (160 lb). .    Pt will need refill on Hydrocodone today.    Chichi Looney  [facility] RN  "

## 2020-12-30 NOTE — PROGRESS NOTES
"No complaints.  Pain controlled with oral pain meds.    /66 (BP Location: Right arm, Patient Position: Sitting, Cuff Size: Adult Regular)   Pulse 76   Temp 97.2  F (36.2  C) (Tympanic)   Resp 12   Ht 1.676 m (5' 6\")   Wt 72.6 kg (160 lb)   BMI 25.82 kg/m      Exam  [id number]  CTAB  RRR  S&NTND+BS, wounds - cdi s erythema  No CCE    A/P status post lap jumana complicated by bile leak.  NYDIA drain pulled today.  Patient will follow up with GI in the next month for stent removal.    Qasim Farah MD   "

## 2020-12-30 NOTE — LETTER
"    12/30/2020         RE: Iam Villanueva  [address] [facility] [address]        Dear Colleague,    Thank you for referring your patient, Iam Villanueva, to the [facility]. Please see a copy of my visit note below.    No complaints.  Pain controlled with oral pain meds.    /66 (BP Location: Right arm, Patient Position: Sitting, Cuff Size: Adult Regular)   Pulse 76   Temp 97.2  F (36.2  C) (Tympanic)   Resp 12   Ht 1.676 m (5' 6\")   Wt 72.6 kg (160 lb)   BMI 25.82 kg/m      Exam  [id number]  CTAB  RRR  S&NTND+BS, wounds - cdi s erythema  No CCE    A/P status post lap jumana complicated by bile leak.  NYDIA drain pulled today.  Patient will follow up with GI in the next month for stent removal.    Qasim Farah MD       Again, thank you for allowing me to participate in the care of your patient.        Sincerely,        Qasim Farah MD    "

## 2021-01-31 DIAGNOSIS — Z11.59 ENCOUNTER FOR SCREENING FOR OTHER VIRAL DISEASES: ICD-10-CM

## 2021-02-01 ENCOUNTER — NURSE TRIAGE (OUTPATIENT)
Dept: NURSING | Facility: CLINIC | Age: 61
End: 2021-02-01

## 2021-02-01 NOTE — TELEPHONE ENCOUNTER
Surgery on 2/16/21  Needs preop, scheduled 2/12/21  Asking for a COVID19 preprocedure test [facility]?  Patient stated he waiting an hour to schedule this test, apologies for the wait were given. Patient was warm transferred to scheduling for service recovery.   Kassi Duenas RN on 2/1/2021 at 12:35 PM                  Reason for Disposition    COVID-19 Testing, questions about    Additional Information    Negative: [1] COVID-19 infection suspected by caller or triager AND [2] mild symptoms (cough, fever, or others) AND [3] no complications or SOB    Negative: Cough present > 3 weeks    Negative: [1] COVID-19 diagnosed by positive lab test AND [2] mild symptoms (e.g., cough, fever, others) AND [3] no complications or SOB    Negative: COVID-19 Home Isolation, questions about    Negative: [1] COVID-19 diagnosed by HCP (doctor, NP or PA) AND [2] mild symptoms (e.g., cough, fever, others) AND [3] no complications or SOB    Negative: Fever present > 3 days (72 hours)    Negative: [1] Fever returns after gone for over 24 hours AND [2] symptoms worse or not improved    Negative: [1] Continuous (nonstop) coughing interferes with work or school AND [2] no improvement using cough treatment per protocol    Negative: [1] HIGH RISK patient (e.g., age > 64 years, diabetes, heart or lung disease, weak immune system) AND [2] new or worsening symptoms    Negative: [1] HIGH RISK patient AND [2] influenza is widespread in the community AND [3] ONE OR MORE respiratory symptoms: cough, sore throat, runny or stuffy nose    Negative: [1] HIGH RISK patient AND [2] influenza exposure within the last 7 days AND [3] ONE OR MORE respiratory symptoms: cough, sore throat, runny or stuffy nose    Negative: Fever > 103 F (39.4 C)    Negative: [1] Fever > 101 F (38.3 C) AND [2] age > 60    Negative: [1] Fever > 100.0 F (37.8 C) AND [2] bedridden (e.g., nursing home patient, CVA, chronic illness, recovering from surgery)    Negative:  MILD difficulty breathing (e.g., minimal/no SOB at rest, SOB with walking, pulse <100)    Negative: Chest pain or pressure    Negative: Patient sounds very sick or weak to the triager    Negative: SEVERE or constant chest pain or pressure (Exception: mild central chest pain, present only when coughing)    Negative: MODERATE difficulty breathing (e.g., speaks in phrases, SOB even at rest, pulse 100-120)    Negative: [1] Headache AND [2] stiff neck (can't touch chin to chest)    Negative: [1] COVID-19 exposure AND [2] no symptoms    Negative: [1] Lives with someone known to have influenza (flu test positive) AND [2] flu-like symptoms (e.g., cough, runny nose, sore throat, SOB; with or without fever)    Negative: [1] Adult with possible COVID-19 symptoms AND [2] triager concerned about severity of symptoms or other causes    Negative: COVID-19 vaccine reaction suspected (e.g., fever, headache, muscle aches) occurring during days 1-3 after getting vaccine    Negative: COVID-19 vaccine, questions about    Negative: COVID-19 and breastfeeding, questions about    Negative: SEVERE difficulty breathing (e.g., struggling for each breath, speaks in single words)    Negative: Difficult to awaken or acting confused (e.g., disoriented, slurred speech)    Negative: Bluish (or gray) lips or face now    Negative: Shock suspected (e.g., cold/pale/clammy skin, too weak to stand, low BP, rapid pulse)    Negative: Sounds like a life-threatening emergency to the triager    Protocols used: CORONAVIRUS (COVID-19) DIAGNOSED OR VEGBSTAMR-P-SR 1.3

## 2021-02-12 ENCOUNTER — OFFICE VISIT (OUTPATIENT)
Dept: FAMILY MEDICINE | Facility: CLINIC | Age: 61
End: 2021-02-12
Payer: COMMERCIAL

## 2021-02-12 VITALS
BODY MASS INDEX: 25.55 KG/M2 | WEIGHT: 159 LBS | RESPIRATION RATE: 16 BRPM | HEART RATE: 93 BPM | SYSTOLIC BLOOD PRESSURE: 110 MMHG | DIASTOLIC BLOOD PRESSURE: 72 MMHG | TEMPERATURE: 97.9 F | OXYGEN SATURATION: 95 % | HEIGHT: 66 IN

## 2021-02-12 DIAGNOSIS — Z11.59 ENCOUNTER FOR SCREENING FOR OTHER VIRAL DISEASES: ICD-10-CM

## 2021-02-12 DIAGNOSIS — Z01.818 PREOP GENERAL PHYSICAL EXAM: Primary | ICD-10-CM

## 2021-02-12 LAB
LABORATORY COMMENT REPORT: NORMAL
SARS-COV-2 RNA RESP QL NAA+PROBE: NEGATIVE
SARS-COV-2 RNA RESP QL NAA+PROBE: NORMAL
SPECIMEN SOURCE: NORMAL
SPECIMEN SOURCE: NORMAL

## 2021-02-12 PROCEDURE — U0005 INFEC AGEN DETEC AMPLI PROBE: HCPCS | Performed by: INTERNAL MEDICINE

## 2021-02-12 PROCEDURE — U0003 INFECTIOUS AGENT DETECTION BY NUCLEIC ACID (DNA OR RNA); SEVERE ACUTE RESPIRATORY SYNDROME CORONAVIRUS 2 (SARS-COV-2) (CORONAVIRUS DISEASE [COVID-19]), AMPLIFIED PROBE TECHNIQUE, MAKING USE OF HIGH THROUGHPUT TECHNOLOGIES AS DESCRIBED BY CMS-2020-01-R: HCPCS | Performed by: INTERNAL MEDICINE

## 2021-02-12 PROCEDURE — 99204 OFFICE O/P NEW MOD 45 MIN: CPT | Performed by: FAMILY MEDICINE

## 2021-02-12 ASSESSMENT — MIFFLIN-ST. JEOR: SCORE: 1473.97

## 2021-02-12 NOTE — PATIENT INSTRUCTIONS
Preparing for Your Surgery  Getting started  A nurse will call you to review your health history and instructions. They will give you an arrival time based on your scheduled surgery time.  Please be ready to share the following:    Your doctor's clinic name and phone number    Your medical, surgical and anesthesia history    A list of allergies and sensitivities    A list of medicines, including herbal treatments and over-the-counter drugs    Whether the patient has a legal guardian (ask how to send us the papers in advance)  If you have a child who's having surgery, please ask for a copy of Preparing for Your Child's Surgery.    Preparing for surgery    Within 30 days of surgery: Have a pre-op exam (sometimes called an H&P, or History and Physical). This can be done at a clinic or pre-operative center.  ? If you're having a , you may not need this exam. Talk to your care team    At your pre-op exam, talk to your care team about all medicines you take. If you need to stop any medicines before surgery, ask when to start taking them again.  ? We do this for your safety. Many medicines can make you bleed too much during surgery. Some change how well surgery (anesthesia) drugs work.    Call your insurance company to let them know you're having surgery. (If you don't have insurance, call [phone].)    Call your clinic if there's any change in your health. This includes signs of a cold or flu (sore throat, runny nose, cough, rash, fever). It also includes a scrape or scratch near the surgery site.    If you have questions on the day of surgery, call your hospital or surgery center.  Eating and drinking guidelines  For your safety: Unless your surgeon tells you otherwise, follow the guidelines below.    Eat and drink as usual until 8 hours before surgery. After that, no food or milk.    Drink clear liquids until 2 hours before surgery. These are liquids you can see through, like water, Gatorade and Propel  Water. You may also have black coffee and tea (no cream or milk).    Nothing by mouth within 2 hours of surgery. This includes gum, candy and breath mints.    If you drink, stop drinking alcohol the night before surgery.    If your care team tells you to take medicine on the morning of surgery, it's okay to take it with a sip of water.  Preventing infection    Shower or bathe the night before and morning of your surgery. Follow the instructions your clinic gave you. (If no instructions, use regular soap.)    Don't shave or clip hair near your surgery site. We'll remove the hair if needed.    Don't smoke or vape the morning of surgery. You may chew nicotine gum up to 2 hours before surgery. A nicotine patch is okay.  ? Note: Some surgeries require you to completely quit smoking and nicotine. Check with your surgeon.    Your care team will make every effort to keep you safe from infection. We will:  ? Clean our hands often with soap and water (or an alcohol-based hand rub).  ? Clean the skin at your surgery site with a special soap that kills germs.  ? Give you a special gown to keep you warm. (Cold raises the risk of infection.)  ? Wear special hair covers, masks, gowns and gloves during surgery.  ? Give antibiotic medicine, if prescribed. Not all surgeries need antibiotics.  What to bring on the day of surgery    Photo ID and insurance card    Copy of your health care directive, if you have one    Glasses and hearing aides (bring cases)  ? You can't wear contacts during surgery    Inhaler and eye drops, if you use them (tell us about these when you arrive)    CPAP machine or breathing device, if you use them    A few personal items, if spending the night    If you have . . .  ? A pacemaker or ICD (cardiac defibrillator): Bring the ID card.  ? An implanted stimulator: Bring the remote control.  ? A legal guardian: Bring a copy of the certified (court-stamped) guardianship papers.  Please remove any jewelry, including  body piercings. Leave jewelry and other valuables at home.  If you're going home the day of surgery  Important: If you don't follow the rules below, we must cancel your surgery.     Arrange for someone to drive you home after surgery. You may not drive, take a taxi or take public transportation by yourself (unless you'll have local anesthesia only).    Arrange for a responsible adult to stay with you overnight. If you don't, we may keep you in the hospital overnight, and you may need to pay the costs yourself.  Questions?   If you have any questions for your care team, list them here: _________________________________________________________________________________________________________________________________________________________________________________________________________________________________________________________________________________________________________________________  For informational purposes only. Not to replace the advice of your health care provider. Copyright   2003, 2019 [employer]. All rights reserved. Clinically reviewed by Fatou Kohli MD. [employer] [id number] - REV 4/20.            Thank you for choosing [facility].  You may be receiving an email and/or telephone survey request from [facility] [employer] regarding your visit today.  Please take a few minutes to respond to the survey to let us know how we are doing.      If you have questions or concerns, please contact us via [employer] or you can contact your care team at [phone].    Our Clinic hours are:  Monday 6:40 am  to 7:00 pm  Tuesday -Friday 6:40 am to 5:00 pm    The [place] outpatient lab hours are:  Monday - Friday 6:10 am to 4:45 pm  Saturdays 7:00 am to 11:00 am  Appointments are required, call [phone]    If you have clinical questions after hours or would like to schedule an appointment,  call the clinic at [phone].    Diagnostics:  No labs were ordered during this visit. He will  get the Covid test today. The future labs are ordered for the day of surgery.   No EKG required, no history of coronary heart disease, significant arrhythmia, peripheral arterial disease or other structural heart disease.    Revised Cardiac Risk Index (RCRI):  The patient has the following serious cardiovascular risks for perioperative complications:   - No serious cardiac risks = 0 points     RCRI Interpretation: 0 points: Class I (very low risk - 0.4% complication rate)    Your stomach should be empty for 8 hours before surgery.   Take no aspirin or advil or aleve for one week before surgery. Tylenol is OK.

## 2021-02-15 ENCOUNTER — ANESTHESIA EVENT (OUTPATIENT)
Dept: SURGERY | Facility: CLINIC | Age: 61
End: 2021-02-15
Payer: COMMERCIAL

## 2021-02-15 NOTE — ANESTHESIA PREPROCEDURE EVALUATION
Anesthesia Pre-Procedure Evaluation    Patient: Iam Villanueva   MRN: [id number] : 1960        Preoperative Diagnosis: Bile leak [K83.9]   Procedure : Procedure(s):  ENDOSCOPIC RETROGRADE CHOLANGIOPANCREATOGRAPHY     History reviewed. No pertinent past medical history.   Past Surgical History:   Procedure Laterality Date     ENDOSCOPIC RETROGRADE CHOLANGIOPANCREATOGRAM N/A 2020    Procedure: ENDOSCOPIC RETROGRADE CHOLANGIOPANCREATOGRAPHY, biliary sphincterotomy and stent placement;  Surgeon: Nima Barnard MD;  Location: [facility] OR     LAPAROSCOPIC CHOLECYSTECTOMY N/A 2020    Procedure: CHOLECYSTECTOMY, LAPAROSCOPIC;  Surgeon: Qasim Farah MD;  Location: [facility] OR      No Known Allergies   Social History     Tobacco Use     Smoking status: Light Tobacco Smoker     Types: Cigarettes     Smokeless tobacco: Never Used   Substance Use Topics     Alcohol use: Not Currently      Wt Readings from Last 1 Encounters:   21 72.1 kg (159 lb)        Anesthesia Evaluation   Pt has had prior anesthetic. Type: General.    No history of anesthetic complications       ROS/MED HX  ENT/Pulmonary:  - neg pulmonary ROS     Neurologic:  - neg neurologic ROS     Cardiovascular:     (+) -----Previous cardiac testing   Echo: Date: Results:    Stress Test: Date: Results:    ECG Reviewed: Date: 20 Results:  Left axis deviation; left anterior fascicular block  Cath: Date: Results:      METS/Exercise Tolerance:     Hematologic:  - neg hematologic  ROS     Musculoskeletal:  - neg musculoskeletal ROS     GI/Hepatic: Comment: S/p cholecystectomy with biliary leak requiring stent placement.      Renal/Genitourinary:  - neg Renal ROS     Endo:  - neg endo ROS     Psychiatric/Substance Use:  - neg psychiatric ROS     Infectious Disease:  - neg infectious disease ROS     Malignancy:  - neg malignancy ROS     Other:            Physical Exam    Airway        Mallampati: II   TM distance: > 3 FB   Neck ROM: full   Mouth  opening: > 3 cm    Respiratory Devices and Support         Dental       (+) upper dentures    B=Bridge, C=Chipped, L=Loose, M=Missing    Cardiovascular   cardiovascular exam normal          Pulmonary   pulmonary exam normal                OUTSIDE LABS:  CBC:   Lab Results   Component Value Date    WBC 11.5 (H) 12/24/2020    WBC 11.0 12/22/2020    HGB 14.5 12/24/2020    HGB 16.0 12/22/2020    HCT 42.9 12/24/2020    HCT 46.7 12/22/2020     12/24/2020     12/22/2020     BMP:   Lab Results   Component Value Date     12/24/2020     12/22/2020    POTASSIUM 4.1 12/24/2020    POTASSIUM 4.3 12/22/2020    CHLORIDE 107 12/24/2020    CHLORIDE 101 12/22/2020    CO2 23 12/24/2020    CO2 26 12/22/2020    BUN 17 12/24/2020    BUN 17 12/22/2020    CR 1.01 12/24/2020    CR 0.93 12/22/2020     (H) 12/24/2020     (H) 12/22/2020     COAGS:   Lab Results   Component Value Date    INR 1.07 12/24/2020     POC:   Lab Results   Component Value Date    BGM 99 12/23/2020     HEPATIC:   Lab Results   Component Value Date    ALBUMIN 2.9 (L) 12/24/2020    PROTTOTAL 7.3 12/24/2020    ALT 43 12/24/2020    AST 22 12/24/2020    ALKPHOS 85 12/24/2020    BILITOTAL 0.6 12/24/2020     OTHER:   Lab Results   Component Value Date    MELECIO 9.1 12/24/2020    LIPASE 41 (L) 12/22/2020       Anesthesia Plan    ASA Status:  1      Anesthesia Type: General.     - Airway: ETT   Induction: Intravenous.   Maintenance: Inhalation.        Consents    Anesthesia Plan(s) and associated risks, benefits, and realistic alternatives discussed. Questions answered and patient/representative(s) expressed understanding.     - Discussed with:  Patient      - Extended Intubation/Ventilatory Support Discussed: no Extended Intubation.      - Patient is DNR/DNI Status: No    Use of blood products discussed: No .     Postoperative Care    Pain management: Oral pain medications.   PONV prophylaxis: Ondansetron (or other 5HT-3)  "    Comments:    This is a 61-year-old male undergoing ERCP for evaluation of biliary stent removal after iatrogenic biliary leak during cholecystectomy in 12/20.  No other medical history.  \"Light\" tobacco smoker.            Mazin Fleming MD  "

## 2021-02-16 ENCOUNTER — HOSPITAL ENCOUNTER (OUTPATIENT)
Facility: CLINIC | Age: 61
Discharge: HOME | End: 2021-02-16
Attending: INTERNAL MEDICINE | Admitting: INTERNAL MEDICINE
Payer: COMMERCIAL

## 2021-02-16 ENCOUNTER — APPOINTMENT (OUTPATIENT)
Dept: GENERAL RADIOLOGY | Facility: CLINIC | Age: 61
End: 2021-02-16
Attending: INTERNAL MEDICINE
Payer: COMMERCIAL

## 2021-02-16 ENCOUNTER — ANESTHESIA (OUTPATIENT)
Dept: SURGERY | Facility: CLINIC | Age: 61
End: 2021-02-16
Payer: COMMERCIAL

## 2021-02-16 VITALS
HEIGHT: 66 IN | WEIGHT: 162.26 LBS | BODY MASS INDEX: 26.08 KG/M2 | TEMPERATURE: 97.8 F | HEART RATE: 75 BPM | OXYGEN SATURATION: 97 % | SYSTOLIC BLOOD PRESSURE: 137 MMHG | RESPIRATION RATE: 16 BRPM | DIASTOLIC BLOOD PRESSURE: 81 MMHG

## 2021-02-16 DIAGNOSIS — K83.9 BILE LEAK: ICD-10-CM

## 2021-02-16 LAB
ALBUMIN SERPL-MCNC: 3.7 G/DL (ref 3.4–5)
ALP SERPL-CCNC: 104 U/L (ref 40–150)
ALT SERPL W P-5'-P-CCNC: 35 U/L (ref 0–70)
AMYLASE SERPL-CCNC: 50 U/L (ref 30–110)
ANION GAP SERPL CALCULATED.3IONS-SCNC: 5 MMOL/L (ref 3–14)
AST SERPL W P-5'-P-CCNC: 19 U/L (ref 0–45)
BILIRUB SERPL-MCNC: 0.4 MG/DL (ref 0.2–1.3)
BUN SERPL-MCNC: 13 MG/DL (ref 7–30)
CALCIUM SERPL-MCNC: 9.3 MG/DL (ref 8.5–10.1)
CHLORIDE SERPL-SCNC: 109 MMOL/L (ref 94–109)
CO2 SERPL-SCNC: 26 MMOL/L (ref 20–32)
CREAT SERPL-MCNC: 0.92 MG/DL (ref 0.66–1.25)
ERCP: NORMAL
ERYTHROCYTE [DISTWIDTH] IN BLOOD BY AUTOMATED COUNT: 12.9 % (ref 10–15)
GFR SERPL CREATININE-BSD FRML MDRD: 90 ML/MIN/{1.73_M2}
GLUCOSE BLDC GLUCOMTR-MCNC: 114 MG/DL (ref 70–99)
GLUCOSE BLDC GLUCOMTR-MCNC: 120 MG/DL (ref 70–99)
GLUCOSE SERPL-MCNC: 105 MG/DL (ref 70–99)
HCT VFR BLD AUTO: 47.1 % (ref 40–53)
HGB BLD-MCNC: 15.5 G/DL (ref 13.3–17.7)
INR PPP: 1.02 (ref 0.86–1.14)
LIPASE SERPL-CCNC: 100 U/L (ref 73–393)
MCH RBC QN AUTO: 30.9 PG (ref 26.5–33)
MCHC RBC AUTO-ENTMCNC: 32.9 G/DL (ref 31.5–36.5)
MCV RBC AUTO: 94 FL (ref 78–100)
PLATELET # BLD AUTO: 194 10E9/L (ref 150–450)
POTASSIUM SERPL-SCNC: 3.9 MMOL/L (ref 3.4–5.3)
PROT SERPL-MCNC: 7.6 G/DL (ref 6.8–8.8)
RBC # BLD AUTO: 5.02 10E12/L (ref 4.4–5.9)
SODIUM SERPL-SCNC: 140 MMOL/L (ref 133–144)
WBC # BLD AUTO: 8 10E9/L (ref 4–11)

## 2021-02-16 PROCEDURE — 83690 ASSAY OF LIPASE: CPT | Performed by: STUDENT IN AN ORGANIZED HEALTH CARE EDUCATION/TRAINING PROGRAM

## 2021-02-16 PROCEDURE — 82150 ASSAY OF AMYLASE: CPT | Performed by: STUDENT IN AN ORGANIZED HEALTH CARE EDUCATION/TRAINING PROGRAM

## 2021-02-16 PROCEDURE — 255N000002 HC RX 255 OP 636: Performed by: INTERNAL MEDICINE

## 2021-02-16 PROCEDURE — 250N000025 HC SEVOFLURANE, PER MIN: Performed by: INTERNAL MEDICINE

## 2021-02-16 PROCEDURE — 360N000082 HC SURGERY LEVEL 2 W/ FLUORO, PER MIN: Performed by: INTERNAL MEDICINE

## 2021-02-16 PROCEDURE — 80053 COMPREHEN METABOLIC PANEL: CPT | Performed by: STUDENT IN AN ORGANIZED HEALTH CARE EDUCATION/TRAINING PROGRAM

## 2021-02-16 PROCEDURE — 272N000001 HC OR GENERAL SUPPLY STERILE: Performed by: INTERNAL MEDICINE

## 2021-02-16 PROCEDURE — 36415 COLL VENOUS BLD VENIPUNCTURE: CPT | Performed by: STUDENT IN AN ORGANIZED HEALTH CARE EDUCATION/TRAINING PROGRAM

## 2021-02-16 PROCEDURE — 250N000009 HC RX 250: Performed by: STUDENT IN AN ORGANIZED HEALTH CARE EDUCATION/TRAINING PROGRAM

## 2021-02-16 PROCEDURE — 710N000012 HC RECOVERY PHASE 2, PER MINUTE: Performed by: INTERNAL MEDICINE

## 2021-02-16 PROCEDURE — 710N000010 HC RECOVERY PHASE 1, LEVEL 2, PER MIN: Performed by: INTERNAL MEDICINE

## 2021-02-16 PROCEDURE — 250N000011 HC RX IP 250 OP 636: Performed by: STUDENT IN AN ORGANIZED HEALTH CARE EDUCATION/TRAINING PROGRAM

## 2021-02-16 PROCEDURE — 999N001017 HC STATISTIC GLUCOSE BY METER IP

## 2021-02-16 PROCEDURE — 999N000141 HC STATISTIC PRE-PROCEDURE NURSING ASSESSMENT: Performed by: INTERNAL MEDICINE

## 2021-02-16 PROCEDURE — 370N000017 HC ANESTHESIA TECHNICAL FEE, PER MIN: Performed by: INTERNAL MEDICINE

## 2021-02-16 PROCEDURE — C1726 CATH, BAL DIL, NON-VASCULAR: HCPCS | Performed by: INTERNAL MEDICINE

## 2021-02-16 PROCEDURE — C1769 GUIDE WIRE: HCPCS | Performed by: INTERNAL MEDICINE

## 2021-02-16 PROCEDURE — 85610 PROTHROMBIN TIME: CPT | Performed by: STUDENT IN AN ORGANIZED HEALTH CARE EDUCATION/TRAINING PROGRAM

## 2021-02-16 PROCEDURE — 85027 COMPLETE CBC AUTOMATED: CPT | Performed by: STUDENT IN AN ORGANIZED HEALTH CARE EDUCATION/TRAINING PROGRAM

## 2021-02-16 PROCEDURE — 250N000009 HC RX 250: Performed by: INTERNAL MEDICINE

## 2021-02-16 PROCEDURE — 999N000179 XR SURGERY CARM FLUORO LESS THAN 5 MIN W STILLS: Mod: TC

## 2021-02-16 RX ORDER — NALOXONE HYDROCHLORIDE 0.4 MG/ML
0.2 INJECTION, SOLUTION INTRAMUSCULAR; INTRAVENOUS; SUBCUTANEOUS
Status: DISCONTINUED | OUTPATIENT
Start: 2021-02-16 | End: 2021-02-16 | Stop reason: HOSPADM

## 2021-02-16 RX ORDER — NALOXONE HYDROCHLORIDE 0.4 MG/ML
0.4 INJECTION, SOLUTION INTRAMUSCULAR; INTRAVENOUS; SUBCUTANEOUS
Status: DISCONTINUED | OUTPATIENT
Start: 2021-02-16 | End: 2021-02-16 | Stop reason: HOSPADM

## 2021-02-16 RX ORDER — SODIUM CHLORIDE, SODIUM LACTATE, POTASSIUM CHLORIDE, CALCIUM CHLORIDE 600; 310; 30; 20 MG/100ML; MG/100ML; MG/100ML; MG/100ML
INJECTION, SOLUTION INTRAVENOUS CONTINUOUS
Status: DISCONTINUED | OUTPATIENT
Start: 2021-02-16 | End: 2021-02-16 | Stop reason: HOSPADM

## 2021-02-16 RX ORDER — ONDANSETRON 4 MG/1
4 TABLET, ORALLY DISINTEGRATING ORAL EVERY 30 MIN PRN
Status: DISCONTINUED | OUTPATIENT
Start: 2021-02-16 | End: 2021-02-16 | Stop reason: HOSPADM

## 2021-02-16 RX ORDER — LIDOCAINE HYDROCHLORIDE 20 MG/ML
INJECTION, SOLUTION INFILTRATION; PERINEURAL PRN
Status: DISCONTINUED | OUTPATIENT
Start: 2021-02-16 | End: 2021-02-16

## 2021-02-16 RX ORDER — MEPERIDINE HYDROCHLORIDE 25 MG/ML
12.5 INJECTION INTRAMUSCULAR; INTRAVENOUS; SUBCUTANEOUS
Status: DISCONTINUED | OUTPATIENT
Start: 2021-02-16 | End: 2021-02-16 | Stop reason: HOSPADM

## 2021-02-16 RX ORDER — FENTANYL CITRATE 50 UG/ML
INJECTION, SOLUTION INTRAMUSCULAR; INTRAVENOUS PRN
Status: DISCONTINUED | OUTPATIENT
Start: 2021-02-16 | End: 2021-02-16

## 2021-02-16 RX ORDER — ONDANSETRON 2 MG/ML
4 INJECTION INTRAMUSCULAR; INTRAVENOUS EVERY 30 MIN PRN
Status: DISCONTINUED | OUTPATIENT
Start: 2021-02-16 | End: 2021-02-16 | Stop reason: HOSPADM

## 2021-02-16 RX ORDER — CHOLECALCIFEROL (VITAMIN D3) 50 MCG
1 TABLET ORAL DAILY
COMMUNITY
End: 2023-04-14

## 2021-02-16 RX ORDER — LIDOCAINE 40 MG/G
CREAM TOPICAL
Status: DISCONTINUED | OUTPATIENT
Start: 2021-02-16 | End: 2021-02-16 | Stop reason: HOSPADM

## 2021-02-16 RX ORDER — DEXAMETHASONE SODIUM PHOSPHATE 4 MG/ML
INJECTION, SOLUTION INTRA-ARTICULAR; INTRALESIONAL; INTRAMUSCULAR; INTRAVENOUS; SOFT TISSUE PRN
Status: DISCONTINUED | OUTPATIENT
Start: 2021-02-16 | End: 2021-02-16

## 2021-02-16 RX ORDER — INDOMETHACIN 50 MG/1
100 SUPPOSITORY RECTAL
Status: DISCONTINUED | OUTPATIENT
Start: 2021-02-16 | End: 2021-02-16 | Stop reason: HOSPADM

## 2021-02-16 RX ORDER — IOPAMIDOL 510 MG/ML
INJECTION, SOLUTION INTRAVASCULAR PRN
Status: DISCONTINUED | OUTPATIENT
Start: 2021-02-16 | End: 2021-02-16 | Stop reason: HOSPADM

## 2021-02-16 RX ORDER — FENTANYL CITRATE 50 UG/ML
25-50 INJECTION, SOLUTION INTRAMUSCULAR; INTRAVENOUS
Status: DISCONTINUED | OUTPATIENT
Start: 2021-02-16 | End: 2021-02-16 | Stop reason: HOSPADM

## 2021-02-16 RX ORDER — PROPOFOL 10 MG/ML
INJECTION, EMULSION INTRAVENOUS PRN
Status: DISCONTINUED | OUTPATIENT
Start: 2021-02-16 | End: 2021-02-16

## 2021-02-16 RX ADMIN — DEXAMETHASONE SODIUM PHOSPHATE 8 MG: 4 INJECTION, SOLUTION INTRA-ARTICULAR; INTRALESIONAL; INTRAMUSCULAR; INTRAVENOUS; SOFT TISSUE at 08:01

## 2021-02-16 RX ADMIN — SUGAMMADEX 150 MG: 100 INJECTION, SOLUTION INTRAVENOUS at 08:33

## 2021-02-16 RX ADMIN — PROPOFOL 100 MG: 10 INJECTION, EMULSION INTRAVENOUS at 08:01

## 2021-02-16 RX ADMIN — ONDANSETRON 4 MG: 2 INJECTION INTRAMUSCULAR; INTRAVENOUS at 08:58

## 2021-02-16 RX ADMIN — FENTANYL CITRATE 100 MCG: 50 INJECTION, SOLUTION INTRAMUSCULAR; INTRAVENOUS at 08:01

## 2021-02-16 RX ADMIN — ROCURONIUM BROMIDE 50 MG: 10 INJECTION INTRAVENOUS at 08:01

## 2021-02-16 ASSESSMENT — MIFFLIN-ST. JEOR: SCORE: 1488.5

## 2021-02-16 NOTE — ANESTHESIA POSTPROCEDURE EVALUATION
Patient: Iam Villanueva    Procedure(s):  ENDOSCOPIC RETROGRADE CHOLANGIOPANCREATOGRAPHY WITH BILE DUCT STENT AND DEBRIS REMOVAL    Diagnosis:Bile leak [K83.9]  Diagnosis Additional Information: No value filed.    Anesthesia Type:  General    Note:  Disposition: Outpatient   Postop Pain Control: Uneventful            Sign Out: Well controlled pain   PONV: No   Neuro/Psych: Uneventful            Sign Out: Acceptable/Baseline neuro status   Airway/Respiratory: Uneventful            Sign Out: Acceptable/Baseline resp. status   CV/Hemodynamics: Uneventful            Sign Out: Acceptable CV status   Other NRE: NONE   DID A NON-ROUTINE EVENT OCCUR? No         Last vitals:  Vitals:    02/16/21 0844 02/16/21 0845 02/16/21 0900   BP: 122/71 111/73 117/70   Pulse: 79 79 69   Resp: 12 11 10   Temp: 35.9  C (96.6  F)  36.3  C (97.3  F)   SpO2: 98% 98% 94%       Last vitals prior to Anesthesia Care Transfer:  CRNA VITALS  2/16/2021 0811 - 2/16/2021 0911      2/16/2021             Resp Rate (observed):  (!) 6          Electronically Signed By: Pawan Valle MD  February 16, 2021  9:25 AM

## 2021-02-16 NOTE — OR NURSING
Patient arrived from PACU awake and alert; transferred from stretcher to chair without any difficulty. Patient denies any pain or nausea.  Patient given clear liquids.  Discharge instructions reviewed.

## 2021-02-16 NOTE — DISCHARGE INSTRUCTIONS
[facility], [place]  Same-Day Surgery ERCP Procedure   Adult Discharge Orders & Instructions     You had a procedure known as an Endoscopic Retrograde Cholangiopancreatography (ERCP). Your healthcare provider performed the ERCP to look at your bile or pancreatic ducts, and to locate and/or treat blockages (dilation, stenting, removal, etc.) in these ducts. Often biopsies, small samples of tissue, are taken to help diagnose and/or classify stages of disease growth. This procedure is used to diagnose diseases of the pancreas, bile ducts, pancreatic duct, liver, and gallbladder.     After your procedure   1. Make sure to clarify with your healthcare provider any diet restrictions (For example, clear liquid, low fat, no caffeine, etc.)   2. Do NOT take aspirin containing medications or any other blood-thinning medicines (anticoagulants) until your healthcare provider says it's OK.   3. You MAY be prescribed antibiotics, depending on what was done and/or found during your EUS, make sure to take antibiotics as prescribed by your healthcare provider    For 24 hours after surgery  1. Get plenty of rest.  A responsible adult must stay with you for at least 24 hours after you leave the hospital.   2. Do not drive or use heavy equipment.  If you have weakness or tingling, don't drive or use heavy equipment until this feeling goes away.  3. Do not drink alcohol.  4. Avoid strenuous or risky activities (gym, yoga, cycling, etc.).  Ask for help when climbing stairs.   5. You may feel lightheaded.  IF so, sit for a few minutes before standing.  Have someone help you get up.   6. If you have nausea (feel sick to your stomach): Drink only clear liquids such as apple juice, ginger ale, broth or 7-Up.  Rest may also help.  Be sure to drink enough fluids.  Move to a regular diet as you feel able.  7. If you feel bloated or have too much gas, use a heating pad on your belly to help reduce the discomfort.  This should help you feel better.   8. You may have a slight fever. This is normal for the first 24 hours.   9. You may have a dry mouth, a sore throat, muscle aches or trouble sleeping.  These are normal and will go away after 24 hours. A sore throat is most common. Use lozenges or gargle with salt water to ease the discomfort.   10. Do not make important or legal decisions.      Call your doctor for any of the followin. Chest pain, and/or shortness of breath  2. Abdominal  pain, bloating or cramping that has not improved or does not respond to pain reliving medications (Tylenol or narcotics if prescribed)   3. Difficulty swallowing or feeling as though food or liquids are stuck in your throat   4. Sore throat lasting more than 2 days or pain that has worsened over time   5. Black or tarry stools   6. Nausea and/or vomiting that is not resolving or has not responded to anti-nausea medications prescribed to you   7. It has been over 8 to 10 hours since surgery and you are still not able to urinate (pass water)   8. Headache for over 24 hours   9. Fever over 100.5 F (38 C) lasting more than 24 hours after the procedure   10. Signs of jaundice or blockage (fever, chills, abdominal pain, yellowing of the whites of your eyes, yellowing of your skin, and/or passing darker than normal urine)     To contact a doctor, call:   [ ] Dr Barnard at [phone] (clinic) (Monday thru Friday 8:00am to 4:30pm)   [ ] [phone] and ask for the Gastroenterology resident on call (answered 24 hours a day)   [ ] Emergency Department: [facility]: [phone]     Take it easy when you get home.  Remember, same day surgery DOES NOT MEAN SAME DAY RECOVERY!  Healing is a gradual process.  You will need some time to recover - you may be more tired than you realize at first.  Rest and relax for at least the first 24 hours at home.  You'll feel better and heal faster if you take good care of yourself.     You may restart your  cholesterol controlling medication and baby Aspirin right away.

## 2021-02-16 NOTE — ANESTHESIA PROCEDURE NOTES
Airway   Date/Time: 2/16/2021 8:05 AM   Patient location during procedure: OR  Staff -   Anesthesiologist:  Pawan Valle MD  Resident/Fellow: Mazin Fleming MD  Performed By: resident    Indications and Patient Condition  Indications for airway management: yifan-procedural  Induction type:intravenousMask difficulty assessment: 2 - vent by mask + OA or adjuvant +/- NMBA    Final Airway Details  Final airway type: endotracheal airway  Successful airway:ETT - single  Endotracheal Airway Details   ETT size (mm): 8.0  Cuffed: yes  Successful intubation technique: direct laryngoscopy and video laryngoscopy  Grade View of Cords: 1  Adjucts: stylet  Measured from: gums/teeth  Secured at (cm): 23  Secured with: pink tape  Bite block used: None    Post intubation assessment   Placement verified by: capnometry, equal breath sounds and chest rise   Number of attempts at approach: 1  Secured with:pink tape  Ease of procedure: easy  Dentition: Intact and Unchanged

## 2021-02-16 NOTE — OP NOTE
ERCP 02/16/2021  7:19 AM [facility], [place], operated by [facility], [address] [place] [address] [phone]     Endoscopy Department   _______________________________________________________________________________   Patient Name: Iam Villanueva           Procedure Date: 2/16/2021 7:19 AM   MRN: [id number]                       Account Number: [id number]   YOB: 1960              Admit Type: Outpatient   Age: 61                               Room: OR   Gender: Male                          Note Status: Finalized   Attending MD: Nima Barnard MD   Total Sedation Time:   _______________________________________________________________________________       Procedure:           ERCP   Indications:         Follow-up of bile leak   Providers:           Nima Barnard MD   Patient Profile:     Mr Villanueva is a 62yo gentleman with a history of a                        Luscka leak following a cholecystectomy who was managed                        by biliary sphincteortomy and stent placement and soon                        thereafter had his drain removed. He returns feeling                        overall well and now proceeds to repeat interrogation                        and management by ERCP.   Referring MD:        Qasim Farah MD   Medicines:           Indomethacin 100 mg SC, General Anesthesia   Complications:       No immediate complications.   _______________________________________________________________________________   Procedure:           Pre-Anesthesia Assessment:                        - Prior to the procedure, a History and Physical was                        performed, and patient medications and allergies were                        reviewed. The patient is competent. The risks and                        benefits of the procedure and the sedation options and                        risks were discussed with the patient. All questions                        were answered  and informed consent was obtained. Patient                        identification and proposed procedure were verified by                        the nurse in the pre-procedure area. Mental Status                        Examination: alert and oriented. Airway Examination:                        Mallampati Class II (the uvula but not tonsillar pillars                        visualized). Respiratory Examination: clear to                        auscultation. CV Examination: normal. ASA Grade                        Assessment: II - A patient with mild systemic disease.                        After reviewing the risks and benefits, the patient was                        deemed in satisfactory condition to undergo the                        procedure. The anesthesia plan was to use general                        anesthesia. Immediately prior to administration of                        medications, the patient was re-assessed for adequacy to                        receive sedatives. The heart rate, respiratory rate,                        oxygen saturations, blood pressure, adequacy of                        pulmonary ventilation, and response to care were                        monitored throughout the procedure. The physical status                        of the patient was re-assessed after the procedure.                        After obtaining informed consent, the scope was passed                        under direct vision. Throughout the procedure, the                        patient's blood pressure, pulse, and oxygen saturations                        were monitored continuously. The duodenoscope was                        introduced through the mouth, and used to inject                        contrast into and used to inject contrast into the bile                        duct. The ERCP was accomplished without difficulty. The                        patient tolerated the procedure well.                                    "                                                  Findings:         films of the right upper quadrant demonstated the stent and        cholecystectomy clips. Limited white light imaging of the foregut was        notable only for the stent being appropriately positioned and partially        occluded across a patent biliary sphincterotomy. The stent was removed        from the biliary tree using a snare. The bile duct was deeply cannulated        with an 11.5mm balloon in concert with an 0.025\" Visiglide wire.        Contrast was injected and I interpreted the findings. Contrast filled        the entire biliary tree and image quality was excellent. The        intra-hepatic and extra-hepatic biliary duct system was normal and        healtht appearing without any evidence of ongoing extravasation. The        entire system was decompressed. The biliary tree was swept with an 11.5        mm balloon starting at the bifurcation. Small debris was swept from the        duct. The biliary system was then irrigated with 10cc of sterile saline.        The ventral pancreatic duct and orifice were selectively not        interrogated.                                                                                     Impression:          - Uncomplicated removal of a partially occluded biliary                        stent from a patent biliary sphincterotomy                        - Overall healthy, normal appearing biliary system                        without any evidence of ongoing extravasation                        - Uncomplicated removal of biliary debris   Recommendation:      - General anesthesia recovery with probable discharge                        home this morning                        - Follow up with your established providers as scheduled                        - All medicaitons and a diet may resume without delay                        - No plans for future ERCP at this juncture                        - The " findings and recommendations were discussed with                        the patient and their family                                                                                       electronically signed by KARLY Barnard   _____________________   Nima Barnard MD   2/16/2021 8:44:32 AM   I was physically present for the entire viewing portion of the exam.   __________________________   Signature of teaching physician   Aniya/[id number] Barnard MD   Number of Addenda: 0     Note Initiated On: 2/16/2021 7:19 AM

## 2021-02-16 NOTE — OR NURSING
Paged Akil GUY to ask when pt can resume his baby ASA. Daughter Padmaja missed his call. Asked Dr. Barnard to call her.

## 2021-02-16 NOTE — ANESTHESIA CARE TRANSFER NOTE
Patient: Iam Villanueva    Procedure(s):  ENDOSCOPIC RETROGRADE CHOLANGIOPANCREATOGRAPHY WITH BILE DUCT STENT AND DEBRIS REMOVAL    Diagnosis: Bile leak [K83.9]  Diagnosis Additional Information: No value filed.    Anesthesia Type:   General     Note:    Oropharynx: oropharynx clear of all foreign objects  Level of Consciousness: awake  Oxygen Supplementation: face mask  Level of Supplemental Oxygen (L/min / FiO2): 4  Independent Airway: airway patency satisfactory and stable  Dentition: dentition unchanged      Patient transferred to: PACU    Handoff Report: Identifed the Patient, Identified the Reponsible Provider, Reviewed the pertinent medical history, Discussed the surgical course, Reviewed Intra-OP anesthesia mangement and issues during anesthesia, Set expectations for post-procedure period and Allowed opportunity for questions and acknowledgement of understanding      Vitals: (Last set prior to Anesthesia Care Transfer)  CRNA VITALS  2/16/2021 0811 - 2/16/2021 0850      2/16/2021             Resp Rate (observed):  (!) 6        Electronically Signed By: Mazin Fleming MD  February 16, 2021  8:50 AM

## 2021-02-19 ENCOUNTER — PATIENT OUTREACH (OUTPATIENT)
Dept: GASTROENTEROLOGY | Facility: CLINIC | Age: 61
End: 2021-02-19

## 2021-02-19 NOTE — TELEPHONE ENCOUNTER
"Post ERCP (2/16/21) with Dr. Barnard: Follow-up    Post procedure recommendations:   Follow up with your established providers as scheduled                        - All medicaitons and a diet may resume without delay                        - No plans for future ERCP at this juncture                        - The findings and recommendations were discussed with                        the patient and their family     Orders placed: none    Patient states: pt feeling well, no concerning symptoms. Pt does c/o bowel movements \"runny and then not\", is watching diet, stomach growls sometimes. Not concerning. Intermittent sore throat.     Clinic contact and scheduling numbers verified for future questions/concerns.    Lily Steinberg RN Care Coordinator        "

## 2023-02-20 ENCOUNTER — TELEPHONE (OUTPATIENT)
Dept: OTHER | Facility: CLINIC | Age: 63
End: 2023-02-20
Payer: MEDICAID

## 2023-02-20 NOTE — TELEPHONE ENCOUNTER
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     Pt needs to be scheduled for new pt in clinic consult with vascular surgery.  Will route to scheduling to coordinate an appointment at next nearest available.     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     Cecelia Parrish, OMKARN, RN  [facility] [place]    "

## 2023-02-21 NOTE — TELEPHONE ENCOUNTER
Called patient and he does not want to be seen in [place] - Pt was asking to be seen by a surgeon in [place] due to distance in the drive. Transferred to patient to [place] scheduling.

## 2023-02-22 NOTE — TELEPHONE ENCOUNTER
Writer is unable to see referral from the [facility]. Please review and advise if any additional imaging needed. He would like to schedule at [place].

## 2023-03-13 ENCOUNTER — APPOINTMENT (OUTPATIENT)
Dept: MRI IMAGING | Facility: CLINIC | Age: 63
End: 2023-03-13
Attending: EMERGENCY MEDICINE
Payer: COMMERCIAL

## 2023-03-13 ENCOUNTER — APPOINTMENT (OUTPATIENT)
Dept: CT IMAGING | Facility: CLINIC | Age: 63
End: 2023-03-13
Attending: EMERGENCY MEDICINE
Payer: COMMERCIAL

## 2023-03-13 ENCOUNTER — HOSPITAL ENCOUNTER (EMERGENCY)
Facility: CLINIC | Age: 63
Discharge: HOME | End: 2023-03-13
Attending: EMERGENCY MEDICINE | Admitting: EMERGENCY MEDICINE
Payer: COMMERCIAL

## 2023-03-13 VITALS
RESPIRATION RATE: 26 BRPM | WEIGHT: 168 LBS | SYSTOLIC BLOOD PRESSURE: 139 MMHG | OXYGEN SATURATION: 97 % | BODY MASS INDEX: 27 KG/M2 | HEIGHT: 66 IN | DIASTOLIC BLOOD PRESSURE: 79 MMHG | TEMPERATURE: 96.4 F | HEART RATE: 82 BPM

## 2023-03-13 DIAGNOSIS — F10.920 ALCOHOLIC INTOXICATION WITHOUT COMPLICATION (H): ICD-10-CM

## 2023-03-13 DIAGNOSIS — H53.2 DIPLOPIA: ICD-10-CM

## 2023-03-13 LAB
ALBUMIN SERPL BCG-MCNC: 4.1 G/DL (ref 3.5–5.2)
ALP SERPL-CCNC: 79 U/L (ref 40–129)
ALT SERPL W P-5'-P-CCNC: 71 U/L (ref 10–50)
ANION GAP SERPL CALCULATED.3IONS-SCNC: 11 MMOL/L (ref 7–15)
AST SERPL W P-5'-P-CCNC: 94 U/L (ref 10–50)
BASOPHILS # BLD AUTO: 0.1 10E3/UL (ref 0–0.2)
BASOPHILS NFR BLD AUTO: 1 %
BILIRUB SERPL-MCNC: 0.4 MG/DL
BUN SERPL-MCNC: 9.1 MG/DL (ref 8–23)
CALCIUM SERPL-MCNC: 9.1 MG/DL (ref 8.8–10.2)
CHLORIDE SERPL-SCNC: 98 MMOL/L (ref 98–107)
CREAT SERPL-MCNC: 0.95 MG/DL (ref 0.67–1.17)
DEPRECATED HCO3 PLAS-SCNC: 23 MMOL/L (ref 22–29)
EOSINOPHIL # BLD AUTO: 0.2 10E3/UL (ref 0–0.7)
EOSINOPHIL NFR BLD AUTO: 2 %
ERYTHROCYTE [DISTWIDTH] IN BLOOD BY AUTOMATED COUNT: 12.8 % (ref 10–15)
ETHANOL SERPL-MCNC: 0.17 G/DL
GFR SERPL CREATININE-BSD FRML MDRD: 90 ML/MIN/1.73M2
GLUCOSE SERPL-MCNC: 169 MG/DL (ref 70–99)
HCT VFR BLD AUTO: 46.6 % (ref 40–53)
HGB BLD-MCNC: 16.1 G/DL (ref 13.3–17.7)
HOLD SPECIMEN: NORMAL
IMM GRANULOCYTES # BLD: 0.1 10E3/UL
IMM GRANULOCYTES NFR BLD: 1 %
LIPASE SERPL-CCNC: 27 U/L (ref 13–60)
LYMPHOCYTES # BLD AUTO: 1.9 10E3/UL (ref 0.8–5.3)
LYMPHOCYTES NFR BLD AUTO: 27 %
MCH RBC QN AUTO: 32.1 PG (ref 26.5–33)
MCHC RBC AUTO-ENTMCNC: 34.5 G/DL (ref 31.5–36.5)
MCV RBC AUTO: 93 FL (ref 78–100)
MONOCYTES # BLD AUTO: 0.6 10E3/UL (ref 0–1.3)
MONOCYTES NFR BLD AUTO: 8 %
NEUTROPHILS # BLD AUTO: 4.5 10E3/UL (ref 1.6–8.3)
NEUTROPHILS NFR BLD AUTO: 61 %
NRBC # BLD AUTO: 0 10E3/UL
NRBC BLD AUTO-RTO: 0 /100
PLATELET # BLD AUTO: 188 10E3/UL (ref 150–450)
POTASSIUM SERPL-SCNC: 4.3 MMOL/L (ref 3.4–5.3)
PROT SERPL-MCNC: 8.2 G/DL (ref 6.4–8.3)
RBC # BLD AUTO: 5.01 10E6/UL (ref 4.4–5.9)
SODIUM SERPL-SCNC: 132 MMOL/L (ref 136–145)
WBC # BLD AUTO: 7.3 10E3/UL (ref 4–11)

## 2023-03-13 PROCEDURE — 82077 ASSAY SPEC XCP UR&BREATH IA: CPT | Performed by: EMERGENCY MEDICINE

## 2023-03-13 PROCEDURE — 93005 ELECTROCARDIOGRAM TRACING: CPT | Performed by: EMERGENCY MEDICINE

## 2023-03-13 PROCEDURE — 70498 CT ANGIOGRAPHY NECK: CPT

## 2023-03-13 PROCEDURE — 70553 MRI BRAIN STEM W/O & W/DYE: CPT

## 2023-03-13 PROCEDURE — 250N000011 HC RX IP 250 OP 636: Performed by: EMERGENCY MEDICINE

## 2023-03-13 PROCEDURE — 93010 ELECTROCARDIOGRAM REPORT: CPT | Performed by: EMERGENCY MEDICINE

## 2023-03-13 PROCEDURE — 255N000002 HC RX 255 OP 636: Performed by: EMERGENCY MEDICINE

## 2023-03-13 PROCEDURE — 70496 CT ANGIOGRAPHY HEAD: CPT

## 2023-03-13 PROCEDURE — 85004 AUTOMATED DIFF WBC COUNT: CPT | Performed by: EMERGENCY MEDICINE

## 2023-03-13 PROCEDURE — 99284 EMERGENCY DEPT VISIT MOD MDM: CPT | Mod: 25 | Performed by: EMERGENCY MEDICINE

## 2023-03-13 PROCEDURE — 99285 EMERGENCY DEPT VISIT HI MDM: CPT | Mod: 25 | Performed by: EMERGENCY MEDICINE

## 2023-03-13 PROCEDURE — 250N000009 HC RX 250: Performed by: EMERGENCY MEDICINE

## 2023-03-13 PROCEDURE — 36415 COLL VENOUS BLD VENIPUNCTURE: CPT | Performed by: EMERGENCY MEDICINE

## 2023-03-13 PROCEDURE — 70450 CT HEAD/BRAIN W/O DYE: CPT

## 2023-03-13 PROCEDURE — 96374 THER/PROPH/DIAG INJ IV PUSH: CPT | Mod: 59 | Performed by: EMERGENCY MEDICINE

## 2023-03-13 PROCEDURE — 80053 COMPREHEN METABOLIC PANEL: CPT | Performed by: EMERGENCY MEDICINE

## 2023-03-13 PROCEDURE — 83690 ASSAY OF LIPASE: CPT | Performed by: EMERGENCY MEDICINE

## 2023-03-13 PROCEDURE — A9585 GADOBUTROL INJECTION: HCPCS | Performed by: EMERGENCY MEDICINE

## 2023-03-13 RX ORDER — IOPAMIDOL 755 MG/ML
75 INJECTION, SOLUTION INTRAVASCULAR ONCE
Status: COMPLETED | OUTPATIENT
Start: 2023-03-13 | End: 2023-03-13

## 2023-03-13 RX ORDER — GADOBUTROL 604.72 MG/ML
7.5 INJECTION INTRAVENOUS ONCE
Status: COMPLETED | OUTPATIENT
Start: 2023-03-13 | End: 2023-03-13

## 2023-03-13 RX ADMIN — IOPAMIDOL 75 ML: 755 INJECTION, SOLUTION INTRAVENOUS at 20:54

## 2023-03-13 RX ADMIN — GADOBUTROL 7.5 ML: 604.72 INJECTION INTRAVENOUS at 22:22

## 2023-03-13 RX ADMIN — SODIUM CHLORIDE 100 ML: 9 INJECTION, SOLUTION INTRAVENOUS at 20:54

## 2023-03-13 ASSESSMENT — ACTIVITIES OF DAILY LIVING (ADL)
ADLS_ACUITY_SCORE: 35
ADLS_ACUITY_SCORE: 35

## 2023-03-13 ASSESSMENT — VISUAL ACUITY
OD: 20/30
OS: 20/40
OS: 20/40
OD: 20/30

## 2023-03-13 NOTE — ED TRIAGE NOTES
Pt denies chest pain, nausea, vomiting, HA at this time. Pt does have a hx of abdominal aneurysm,  . Pt concerned for brain aneurysm due to visual changes. Pt reports intermittent visual changes where he feels like his eyes are crossing. Pt reports happens to both eyes. This evening pt had an episode where his vision went and became shaky, granddaughter was driving pt in and pt started crying saying something was not right.

## 2023-03-14 NOTE — ED PROVIDER NOTES
History     Chief Complaint   Patient presents with     Eye Problem     Pt denies chest pain, nausea, vomiting, HA at this time. Pt does have a hx of abdominal aneurysm,  . Pt concerned for brain aneurysm due to visual changes. Pt reports intermittent visual changes where he feels like his eyes are crossing. Pt reports happens to both eyes. This evening pt had an episode where his vision went and became shaky, granddaughter was driving pt in and pt started crying saying something was not right.       HPI  Iam Villanueva is a 63 year old male who presents with daughter and granddaughters, he reports having diplopia lasting 45 to 60 seconds at a time intermittently for the past couple months no associated headache difficulty speaking or swallowing, difficulty with numbness or weakness.  Today he was shaking and crying intermittently and his wife called her granddaughter who came over and drove him to the hospital.  He describes seeing red on the way in here, would not talk, he currently feels back to baseline.  He denies any recent febrile illness, vomiting chest pain trouble breathing abdominal pain or diarrhea.  He smokes daily, he reports at least 12 ounces of alcohol a day last use was at noon today.  [facility] and has a abdominal aortic aneurysm per his report and has follow-up scheduled.  His mother had history of cerebral aneurysm.  No recent CNS imaging for this patient.      Allergies:  No Known Allergies    Problem List:    Patient Active Problem List    Diagnosis Date Noted     Bile leak 12/22/2020     Priority: Medium     Acute cholecystitis 12/16/2020     Priority: Medium        Past Medical History:    History reviewed. No pertinent past medical history.    Past Surgical History:    Past Surgical History:   Procedure Laterality Date     ENDOSCOPIC RETROGRADE CHOLANGIOPANCREATOGRAM N/A 12/23/2020    Procedure: ENDOSCOPIC RETROGRADE CHOLANGIOPANCREATOGRAPHY, biliary  "sphincterotomy and stent placement;  Surgeon: Nima Barnard MD;  Location: [facility] OR     ENDOSCOPIC RETROGRADE CHOLANGIOPANCREATOGRAM N/A 2/16/2021    Procedure: ENDOSCOPIC RETROGRADE CHOLANGIOPANCREATOGRAPHY WITH BILE DUCT STENT AND DEBRIS REMOVAL;  Surgeon: Nima Barnard MD;  Location: [facility] OR     LAPAROSCOPIC CHOLECYSTECTOMY N/A 12/17/2020    Procedure: CHOLECYSTECTOMY, LAPAROSCOPIC;  Surgeon: Qasim Farah MD;  Location: [facility] OR       Family History:    History reviewed. No pertinent family history.    Social History:  Marital Status:   [2]  Social History     Tobacco Use     Smoking status: Light Smoker     Types: Cigarettes     Smokeless tobacco: Never   Substance Use Topics     Alcohol use: Not Currently     Drug use: Never        Medications:    vitamin D3 (CHOLECALCIFEROL) 50 mcg (2000 units) tablet          Review of Systems  All other systems reviewed and are negative.    Physical Exam   BP: 127/75  Pulse: 85  Temp: (!) 96.4  F (35.8  C)  Resp: 20  Height: 167.6 cm (5' 6\")  Weight: 76.2 kg (168 lb)  SpO2: 95 %      Physical Exam  GENERAL Nontoxic-appearing no respiratory distress alert and oriented x3. GCS 15 on arrival, throughout stay and at discharge.    HEENT Head atraumatic normocephalic     PERRL, EOMI, conjunctiva clear, sclera nonicteric, no discharge, no periorbital swelling or redness     Oropharynx moist without lesions, erythema or exudate.  Tongue is not swollen.     Nose is unremarkable to inspection, mucous membranes are moist and pink, no nasal discharge or bleeding    PULMONARY lungs are clear to auscultation, breath sounds are symmetrical, bilateral chest rise, no rales rhonchi or wheezes appreciated    CARDIOVASCULAR heart is regular no murmur appreciated.  Peripheral pulses are symmetrical and strong.  Capillary refill is normal.     GASTROINTESTINAL abdomen is soft, nondistended, bowel sounds positive, no mass appreciated, no guarding or rebound    NEUROLOGICAL " Cranial nerves; vision baseline fields intact, PERRL, EOMI, facial sensation intact to light touch, facial muscle tone intact and symmetrical, hearing grossly intact,swallowing without difficulty, SCM strength intact, tongue protrudes midline, shoulder shrug intact      Strength is 5/5 throughout all muscle groups of the extremities     Sensation intact to light touch     There is no ataxia with sitting, able to go from semirecumbent to sitting independently and maintain sitting position    SKIN skin is clear from rash, pink warm and dry    PSYCHIATRIC patient is alert, attentive, good eye contact, well kempt, thought processes are rational and organized, affect is normal, mood is neutral, patient is not agitated, no delusions, able to answer questions appropriately    ED Course          EKG time 2037, normal sinus rhythm rate 81, axes intervals within normal limits, although anterior fascicular block is present, meets voltage criteria for LVH, no acute ST or T wave changes, read by Dr. Fredy Lezama                Results for orders placed or performed during the hospital encounter of 03/13/23 (from the past 24 hour(s))   [place] Draw    Narrative    The following orders were created for panel order [place] Draw.  Procedure                               Abnormality         Status                     ---------                               -----------         ------                     Extra Blue Top Tube[[id number]]                              Final result               Extra Red Top Tube[[id number]]                               Final result               Extra Green Top (Lithium...[[id number]]                      Final result               Extra Purple Top Tube[[id number]]                            Final result                 Please view results for these tests on the individual orders.   Extra Blue Top Tube   Result Value Ref Range    Hold Specimen JIC    Extra Red Top Tube   Result Value Ref Range    Hold  Specimen JIC    Extra Green Top (Lithium Heparin) Tube   Result Value Ref Range    Hold Specimen JIC    Extra Purple Top Tube   Result Value Ref Range    Hold Specimen JIC    CBC with platelets differential    Narrative    The following orders were created for panel order CBC with platelets differential.  Procedure                               Abnormality         Status                     ---------                               -----------         ------                     CBC with platelets and d...[[id number]]                      Final result                 Please view results for these tests on the individual orders.   Comprehensive metabolic panel   Result Value Ref Range    Sodium 132 (L) 136 - 145 mmol/L    Potassium 4.3 3.4 - 5.3 mmol/L    Chloride 98 98 - 107 mmol/L    Carbon Dioxide (CO2) 23 22 - 29 mmol/L    Anion Gap 11 7 - 15 mmol/L    Urea Nitrogen 9.1 8.0 - 23.0 mg/dL    Creatinine 0.95 0.67 - 1.17 mg/dL    Calcium 9.1 8.8 - 10.2 mg/dL    Glucose 169 (H) 70 - 99 mg/dL    Alkaline Phosphatase 79 40 - 129 U/L    AST 94 (H) 10 - 50 U/L    ALT 71 (H) 10 - 50 U/L    Protein Total 8.2 6.4 - 8.3 g/dL    Albumin 4.1 3.5 - 5.2 g/dL    Bilirubin Total 0.4 <=1.2 mg/dL    GFR Estimate 90 >60 mL/min/1.73m2   Lipase   Result Value Ref Range    Lipase 27 13 - 60 U/L   Ethyl Alcohol Level   Result Value Ref Range    Alcohol ethyl 0.17 (H) <=0.01 g/dL   CBC with platelets and differential   Result Value Ref Range    WBC Count 7.3 4.0 - 11.0 10e3/uL    RBC Count 5.01 4.40 - 5.90 10e6/uL    Hemoglobin 16.1 13.3 - 17.7 g/dL    Hematocrit 46.6 40.0 - 53.0 %    MCV 93 78 - 100 fL    MCH 32.1 26.5 - 33.0 pg    MCHC 34.5 31.5 - 36.5 g/dL    RDW 12.8 10.0 - 15.0 %    Platelet Count 188 150 - 450 10e3/uL    % Neutrophils 61 %    % Lymphocytes 27 %    % Monocytes 8 %    % Eosinophils 2 %    % Basophils 1 %    % Immature Granulocytes 1 %    NRBCs per 100 WBC 0 <1 /100    Absolute Neutrophils 4.5 1.6 - 8.3 10e3/uL    Absolute  Lymphocytes 1.9 0.8 - 5.3 10e3/uL    Absolute Monocytes 0.6 0.0 - 1.3 10e3/uL    Absolute Eosinophils 0.2 0.0 - 0.7 10e3/uL    Absolute Basophils 0.1 0.0 - 0.2 10e3/uL    Absolute Immature Granulocytes 0.1 <=0.4 10e3/uL    Absolute NRBCs 0.0 10e3/uL   CT Head w/o Contrast    Narrative    EXAM: CT HEAD W/O CONTRAST, CTA HEAD NECK W CONTRAST  LOCATION: [facility]  DATE/TIME: 3/13/2023 9:06 PM    INDICATION: Acute onset headache, diplopia.  COMPARISON: None.  CONTRAST: 75 mL Isovue 370  TECHNIQUE: Head and neck CT angiogram with IV contrast. Noncontrast head CT followed by axial helical CT images of the head and neck vessels obtained during the arterial phase of intravenous contrast administration. Axial 2D reconstructed images and   multiplanar 3D MIP reconstructed images of the head and neck vessels were performed by the technologist. Dose reduction techniques were used. All stenosis measurements made according to NASCET criteria unless otherwise specified.    FINDINGS:   NONCONTRAST HEAD CT:   INTRACRANIAL CONTENTS: No intracranial hemorrhage, extraaxial collection, or mass effect.  No CT evidence of acute infarct. Small bilateral cerebellar infarcts, probably chronic. Mild presumed chronic small vessel ischemic changes. Mild generalized   volume loss. No hydrocephalus.     VISUALIZED ORBITS/SINUSES/MASTOIDS: No intraorbital abnormality. No paranasal sinus mucosal disease. No middle ear or mastoid effusion.    BONES/SOFT TISSUES: No acute abnormality.    HEAD CTA:  ANTERIOR CIRCULATION: No stenosis/occlusion, aneurysm, or high flow vascular malformation. Standard [place] anatomy.    POSTERIOR CIRCULATION: No stenosis/occlusion, aneurysm, or high flow vascular malformation. Balanced vertebral arteries supply a normal basilar artery.     DURAL VENOUS SINUSES: Expected enhancement of the major dural venous sinuses.    NECK CTA:  RIGHT CAROTID: No measurable stenosis or  dissection.    LEFT CAROTID: No measurable stenosis or dissection.    VERTEBRAL ARTERIES: No focal stenosis or dissection. Balanced vertebral arteries.    AORTIC ARCH: Classic aortic arch anatomy with no significant stenosis at the origin of the great vessels.    NONVASCULAR STRUCTURES: Incidental periapical abscess right central mandibular incisor.      Impression    IMPRESSION:   HEAD CT:  1.  No CT evidence for acute intracranial process.  2.  Mild chronic microvascular ischemic changes as above.    HEAD CTA:   1.  Normal CTA [place].    NECK CTA:  1.  Normal neck CTA.   CTA Head Neck w Contrast    Narrative    EXAM: CT HEAD W/O CONTRAST, CTA HEAD NECK W CONTRAST  LOCATION: [facility]  DATE/TIME: 3/13/2023 9:06 PM    INDICATION: Acute onset headache, diplopia.  COMPARISON: None.  CONTRAST: 75 mL Isovue 370  TECHNIQUE: Head and neck CT angiogram with IV contrast. Noncontrast head CT followed by axial helical CT images of the head and neck vessels obtained during the arterial phase of intravenous contrast administration. Axial 2D reconstructed images and   multiplanar 3D MIP reconstructed images of the head and neck vessels were performed by the technologist. Dose reduction techniques were used. All stenosis measurements made according to NASCET criteria unless otherwise specified.    FINDINGS:   NONCONTRAST HEAD CT:   INTRACRANIAL CONTENTS: No intracranial hemorrhage, extraaxial collection, or mass effect.  No CT evidence of acute infarct. Small bilateral cerebellar infarcts, probably chronic. Mild presumed chronic small vessel ischemic changes. Mild generalized   volume loss. No hydrocephalus.     VISUALIZED ORBITS/SINUSES/MASTOIDS: No intraorbital abnormality. No paranasal sinus mucosal disease. No middle ear or mastoid effusion.    BONES/SOFT TISSUES: No acute abnormality.    HEAD CTA:  ANTERIOR CIRCULATION: No stenosis/occlusion, aneurysm, or high flow vascular  malformation. Standard [place] anatomy.    POSTERIOR CIRCULATION: No stenosis/occlusion, aneurysm, or high flow vascular malformation. Balanced vertebral arteries supply a normal basilar artery.     DURAL VENOUS SINUSES: Expected enhancement of the major dural venous sinuses.    NECK CTA:  RIGHT CAROTID: No measurable stenosis or dissection.    LEFT CAROTID: No measurable stenosis or dissection.    VERTEBRAL ARTERIES: No focal stenosis or dissection. Balanced vertebral arteries.    AORTIC ARCH: Classic aortic arch anatomy with no significant stenosis at the origin of the great vessels.    NONVASCULAR STRUCTURES: Incidental periapical abscess right central mandibular incisor.      Impression    IMPRESSION:   HEAD CT:  1.  No CT evidence for acute intracranial process.  2.  Mild chronic microvascular ischemic changes as above.    HEAD CTA:   1.  Normal CTA [place].    NECK CTA:  1.  Normal neck CTA.   MR Brain w/o & w Contrast    Narrative    EXAM: MR BRAIN W/O and W CONTRAST  LOCATION: [facility]  DATE/TIME: 3/13/2023 11:03 PM    INDICATION: Intermittent diplopia  COMPARISON: 3/13/2023.  CONTRAST: Gadavist 7.5 mL.  TECHNIQUE: MRI brain without and with contrast.    FINDINGS: On the diffusion-weighted images there is no evidence of acute ischemia or restricted diffusion. There is no discrete mass lesion or midline shift. There is no acute extra-axial fluid collection or acute intraparenchymal hemorrhage. There are   appropriate flow voids within the cavernous portions of the internal carotid arteries and the basilar artery. On the FLAIR and T2-weighted images there are scattered foci of high signal within the periventricular and subcortical white matter consistent   with mild diffuse small vessel ischemic disease. There are old lacunar infarcts involving the cerebellar hemispheres bilaterally. Following the administration of contrast no abnormal enhancement is visualized.  The ventricular system, basal cisterns and   the cortical sulci are consistent with diffuse volume loss.    There is no evidence of cerebellar tonsillar ectopia. The corpus callosum and the sella region have appropriate configuration and signal intensity for the patient's age. The orbit regions are unremarkable. There is no significant paranasal sinus disease.   There is trace fluid left mastoid air cells.      Impression    IMPRESSION:   1.  No discrete mass lesion, hemorrhage or focal area suggestive of acute infarct.  2. No abnormal enhancement.  3. Mild diffuse age related changes along with old lacunar infarcts cerebellar hemispheres bilaterally.       Medications   iopamidol (ISOVUE-370) solution 75 mL (75 mLs Intravenous $Given 3/13/23 2054)   sodium chloride 0.9 % bag 500mL for CT scan flush use (100 mLs Intravenous $Given 3/13/23 2054)   gadobutrol (GADAVIST) injection 7.5 mL (7.5 mLs Intravenous $Given 3/13/23 2222)       Assessments & Plan (with Medical Decision Making)  63-year-old male with episodic diplopia, stroke considered amongst multiple other diagnoses, heavy alcohol use, exam normal here, CT CT angiogram negative as noted above with exception of lacunar infarcts cerebellum, MRI to evaluate for acute infarct is negative for same.  Believe patient's episodic diplopia and crying and confusion today likely secondary to alcohol use.  Recommend stopping alcohol stopping cigarettes, continue to take aspirin 81 mg as prescribed.  Follow-up primary care.  Resources regarding alcohol use and mental health dispensed.  All questions answered.  Return criteria reviewed.     I have reviewed the nursing notes.    I have reviewed the findings, diagnosis, plan and need for follow up with the patient.          Discharge Medication List as of 3/13/2023 11:41 PM          Final diagnoses:   Alcoholic intoxication without complication (H)   Diplopia       3/13/2023   [facility] EMERGENCY DEPT      Fredy Lezama MD  03/14/23 1547

## 2023-03-14 NOTE — DISCHARGE INSTRUCTIONS
Stop drinking alcohol and smoking    Follow-up regarding abdominal aneurysm as scheduled    Return here for any concern    Continue taking aspirin 81 mg daily

## 2023-03-21 ENCOUNTER — OFFICE VISIT (OUTPATIENT)
Dept: VASCULAR SURGERY | Facility: CLINIC | Age: 63
End: 2023-03-21
Payer: COMMERCIAL

## 2023-03-21 VITALS — TEMPERATURE: 96.8 F | HEART RATE: 97 BPM | SYSTOLIC BLOOD PRESSURE: 122 MMHG | DIASTOLIC BLOOD PRESSURE: 77 MMHG

## 2023-03-21 DIAGNOSIS — I73.9 CLAUDICATION OF LEFT LOWER EXTREMITY (H): ICD-10-CM

## 2023-03-21 DIAGNOSIS — I73.9 PAD (PERIPHERAL ARTERY DISEASE) (H): Primary | ICD-10-CM

## 2023-03-21 PROCEDURE — 99204 OFFICE O/P NEW MOD 45 MIN: CPT | Performed by: SURGERY

## 2023-03-21 RX ORDER — ASPIRIN 81 MG/1
81 TABLET ORAL DAILY
COMMUNITY
End: 2023-05-18

## 2023-03-21 RX ORDER — ATORVASTATIN CALCIUM 80 MG/1
80 TABLET, FILM COATED ORAL DAILY
COMMUNITY
End: 2024-02-12

## 2023-03-21 RX ORDER — FAMOTIDINE 20 MG/1
20 TABLET, FILM COATED ORAL 2 TIMES DAILY
COMMUNITY
End: 2024-02-12

## 2023-03-21 ASSESSMENT — PAIN SCALES - GENERAL: PAINLEVEL: NO PAIN (0)

## 2023-03-21 NOTE — PROGRESS NOTES
Patient is in clinic today to see MD Cooper Snider.      Patient is here for a consult to discuss Peripherally calcified distal renal artery aneurysm.    The patient does smoke.    Pt is currently taking no medications that would impact our treatment plan.    The patient states he/she are NOT wearing compression .    Swelling is observed in N/A.    Pt rates pain 0/10 located at .    Pts symptoms include Rest Pain, pain with walking at a distance of 1 block and shade or floaters in eyes in Bilateral  extremity.

## 2023-03-21 NOTE — Clinical Note
Pt needs [facility]- [facility]- will call us tomorrow to let us know what we need to do to get approval and schedule.

## 2023-03-21 NOTE — PATIENT INSTRUCTIONS
Syed Vitale,    Thank you for entrusting your care with us today. After your visit today with MD Ivy Snider this is the plan that was discussed at your appointment.    Smoking cessation    Left leg angiogram- [facility]    Follow up after we determine plan with angiogram    I am including additional information on these things and our contact information if you have any questions or concerns.   Please do not hesitate to reach out to us if you felt we did not answer your questions or you are unsure of the treatment plan after your visit today. Our number is [phone].Thank you for trusting us with your care.         Again thank you for your time.     [employer] is for any [place] looking for free support to quit smoking, vaping or chewing.   [facility] [facility] will offer many quit support options and resources so [place] residents can continue to find the way to quit that works best for them.   Free support includes personalized coaching, email and text support, educational materials, and quit medication (nicotine patches, gum or lozenges) delivered by mail.     Contact [employer] at 5-800-QUIT-NOW or online at [employer] to receive support on your quit journey.

## 2023-03-21 NOTE — NURSING NOTE
"Initial /77   Pulse 97   Temp 96.8  F (36  C) (Tympanic)  Estimated body mass index is 27.12 kg/m  as calculated from the following:    Height as of 3/13/23: 1.676 m (5' 6\").    Weight as of 3/13/23: 76.2 kg (168 lb). .    Kaycee Leach LPN on 3/21/2023 at 1:31 PM    "

## 2023-03-22 ENCOUNTER — TELEPHONE (OUTPATIENT)
Dept: VASCULAR SURGERY | Facility: CLINIC | Age: 63
End: 2023-03-22
Payer: MEDICAID

## 2023-03-22 DIAGNOSIS — I73.9 PAD (PERIPHERAL ARTERY DISEASE) (H): Primary | ICD-10-CM

## 2023-03-22 NOTE — LETTER
Iam Villanueva,    Your visit to [facility] for your procedure is coming soon and we look forward to seeing you! This friendly reminder and pre-procedure checklist will help to ensure your procedure goes smoothly and meets your expectations. At [facility], our goal is to provide you with a great patient experience and to deliver genuine, professional care to every patient.     Please complete all the steps in advance of your visit. If you have any questions about the items listed below, please give our office a call. We can be reached at [phone] or visit our website at [email or web address] for more information.     Procedure: Left  Leg  Angiogram     Procedure Date :  4/28/23    Procedure Time :  10am    Arrival Time: 9am    2 week Post Procedure Appointment with  BERENICE Bautista and also ultrasound: Ultrasounds on 5/15 and virtual visit to discuss results on 5/16      Admission Type: Outpatient    Surgeon:     Procedure Location: [facility]:  [address] [place] [address] (phone: [phone], Fax: [phone])      If you take blood thinners: SEE SPECIFIC INSTRUCTIONS BELOW    PLEASE DO NOT STOP YOUR ASPIRIN OR PLAVIX UNLESS SPECIFICALLY DIRECTED BY THE VASCULAR SURGEON TO STOP!  In most cases Vascular providers want you to continue these. This is different from most NON vascular surgeries and may not be well known by your Primary Care Provider Aspirin: PLEASE DO NOT STOP THIS MEDICATION PRIOR TO SURGERY/ PROCEDURE.       Prepare for the peripheral angiography as follows:  Do not eat 8 hours before your procedure. You may have clear liquids up to 1 hour before surgery.  Tell your healthcare provider about all medicines you take and any allergies you may have.  Arrange for a family member or friend to drive you home.  If you are on Metformin, please HOLD for 48 hours after the  procedure.  Please be sure to address your diabetic medications with your Primary Care Physician prior to your angiogram.    Peripheral Angiography    Peripheral angiography is an outpatient procedure that makes a  map  of the vessels (arteries) in your lower body, legs, and arms, using X-ray and dye.This map can show where blood flow may be blocked.    An angiogram is commonly performed under sedation with the use of local anesthesia.    The procedure usually starts with a needle put into the femoral (groin) artery. From one treatment site, areas all over the body can be treated.  After access is established, catheters (thin tubes) and wires are threaded through the arterial system to a specific area of interest or throughout the entire body.  As a contrast agent (iodine dye) is injected, X-ray images are taken to let your provider view the flow of the dye and identify blockages. The surgeon can then choose the best mode of therapy for you - whether during or following the angiogram. This decision depends on your symptoms and the severity and characteristics of the blockages.  Two common therapies that can be provided during the angiogram are balloon angioplasty and stent placement.                   Angioplasty can be used to open arterial blockages. Guided by X-ray, your provider navigates through the blockage with a wire and introduces a special device equipped with an inflatable balloon. After positioning the balloon device across the blocked portion of the artery, the provider inflates the balloon to expand the artery and compress the blockage. The balloon is then deflated and removed while keeping the wire in place across the area that has been treated. Next, contrast dye is injected to assess the result. Treatment is considered a success if blood flow is improved and less than 30% of the blockage remains. If the vessel is still considerably narrowed, placing a stent may be the next step.    Stents are used  to prop open an artery at the site of a narrowing. Stents are generally placed after balloon angioplasty when there is residual narrowing or insufficient blood flow in a treated vessel. Stents are considered a permanent implant and cannot be used if you have a metal allergy. Stents that are used in the leg are constructed of a nickel-titanium alloy (Nitinol), a memory-shaped metal. This alloy has a predetermined size and shape at body temperature and expands to this size and shape after being introduced through a catheter. These stents resist kinking and are flexible so that damage from activities that involve your legs is minimized.  Your procedure may require other techniques to address the problem or plaque.     If surgery is felt to be a better option, your vascular provider will obtain any additional X-ray images needed to plan a surgical bypass of the blocked vessel/s and will then conclude the angiogram.      During the procedure  Here is what to expect:  You may get medicine through an IV (intravenous) line to relax you. You re given an injection to numb the insertion site. Then, a tiny skin cut (incision) is made near an artery in your groin.  Your provider inserts a thin tube (catheter) through the incision. He or she then threads the catheter into an artery while looking at a video monitor.  Contrast  dye  is injected into the catheter to confirm position. You may feel warmth or pressure in your legs and back. You lie still as X-rays are taken. The catheter is then taken out.  After the procedure  You ll be taken to a recovery area. A healthcare provider will apply pressure to the site for about 10 minutes. Your healthcare provider will tell you how long to lie down and keep the insertion site still. Your healthcare provider will discuss the results with you soon after the procedure.      Angiogram Procedure Discharge Instructions:     1. If you received sedation for your procedure: Do not drive or  operate heavy machinery for the rest of the day.  2. Avoid strenuous activity for 72 hours (3 days):                        - Do not lift greater than 10 pounds.                        - Excessive exercise                        - Straining                        - Return to your normal activities as you tolerate after the 3 days restriction  3. Avoid tub baths, Jacuzzis, hot tubs and pools for 72 hours (3 days) or until puncture site is will healed.  4. You may shower beginning tomorrow. Do not scrub puncture site(s) until well healed, pat dry.  5. You can expect to return to work 1-2 days after your procedure - depending on the nature of your profession.  6. It is normal to have some tenderness and minimal swelling at puncture site. A small area of discoloration may be present. Tenderness typically subsides in 24-48 hours. A small knot may also be present at puncture site for 6-8 weeks, this can be a normal part of the healing process.       After the angiogram If you:      1. Experience any bleeding or active swelling from puncture site: Lie down, firmly apply pressure to puncture site and CALL 9-1-1  2. Fever greater than 101 degrees Fahrenheit.  3. Redness, swelling, warmth to touch, or purulent (yellow/green/foul smelling) drainage from the puncture site.  4. Increasing pain, tenderness or swelling at puncture site OR of arm/leg near puncture site.  5. Feeling weak or faint.  6. Change in color, temperature, or sensation of arm/leg where puncture was made.  7. You can t feel your thrill (pulse at your dialysis fistula site) or it feels weak (If you had fistulogram done).     Call us with any other questions or concerns after your procedure: [phone]      All invasive procedures can have complications. While the risk of an angiogram is low it is not zero. The most common complications are related to the arterial access site.       Risks/ Complications   Bruising is Common  You will likely have bruising  "(ecchymosis) where the artery was entered.    Pain and Bleeding  Less commonly, patients experience pain and bleeding that may include blood collecting under the skin (hematoma).    Blockage and Leakage   In rare cases, the access artery can become blocked. Infrequently, patients experience persistent leakage of blood where the artery was entered, which can result in the formation of a pseudoaneurysm--a blood-filled sac--that may require further treatment.  Other complications related to an angiogram include:   Allergic reaction to the iodine contrast dye, which can lead to the development of kidney failure.  Very rarely during balloon angioplasty and/or stent placement, part of the arterial blockage can break off (embolism) and travel to more distant arteries. This can worsen blood flow.    Pre-Procedure checklist:    [] A Pre-op physical within 30 days of the procedure is required. You will need to set up an appointment with your primary care provider.  [] Contact your insurance regarding coverage  If you would like a Good Radha Estimate for your upcoming procedure at [facility] Location, contact Cost of Care Estimates   Advocates are available Monday through Friday 8am - 5pm   [phone]  You may also submit a request online at [email or web address]  - Complete the secure online form found under \"Services and Procedure Pricing\"   If your procedure is at [facility], please contact the numbers below for Cost of Care Estimates.   - Facility Charge: [phone]    Anesthesiology charge:  [phone]   [] DO NOT BRING FMLA WITH TO SURGERY.  These should be sent to the provider's office by fax to [phone].     [] Day of Surgery  Medications - Take as indicated with sips of water.   Wear comfortable loose-fitting clothes. Wear your glasses-Not contacts. Do not wear jewelry and remove body piercings. Surgery may be cancelled if they are not removed.   You may have 1 family " member wait in the lobby during your surgery. Visitor restrictions are subject to change. Please verify with the surgery nurse when they call.   If same day surgery-Have a someone come with you to surgery that can help you understand the surgeon's instructions, drive you home and stay with you overnight the first night.    [] You will receive a call from a surgery nurse 1-3 days prior to surgery. They will go over more details with you.    Notify our office right away, if you have any changes in your health status, or if you develop a cold, flu, diarrhea, infection, fever or sore throat before your scheduled surgery date. We can be reached at  [phone] Monday-Friday 8 am-4:30 pm if you have any questions.   Thank you for choosing [facility]

## 2023-03-22 NOTE — TELEPHONE ENCOUNTER
Caller: Iam    Provider: MD Ivy Snider    Detailed reason for call: [facility]. They are needing request of service letter with surgery information so they can authorize it prior to him scheduling.    He was told the provider would know what is needed and where to send it. I see from the original referral there is contact information for the [facility].  Sharon Burton phone [phone] and fax [phone].    Best phone number to contact: [phone]    Best time to contact: any    Ok to leave a detailed message: Yes

## 2023-03-22 NOTE — PROGRESS NOTES
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  It is very calcified and stable based on 2 CT scans.  The risk of rupture is very low and I would like to watch it.  Patient also has left common iliac artery severe stenosis and symptomatic.  Patient is complaining of severe lifestyle limiting claudication.  He is currently quitting smoking.  Based on medical management therapy is initiated.    RECOMMENDATION/RISKS: Continue optimal medical management therapy for PAD.  Patient eventually be scheduled for left lower extremity angiogram with stenting of the left common iliac artery.    HPI:  Iam Villanueva is a 63 year old male who was seen today in consultation for right renal artery aneurysm and left lower extremity lifestyle limiting claudication.  Patient denies any pain at rest or nonhealing wounds    REVIEW OF SYSTEMS:    A 12 point ROS was reviewed and is negative except for left leg pain    PHH:  No past medical history on file.       Past Surgical History:   Procedure Laterality Date     ENDOSCOPIC RETROGRADE CHOLANGIOPANCREATOGRAM N/A 12/23/2020    Procedure: ENDOSCOPIC RETROGRADE CHOLANGIOPANCREATOGRAPHY, biliary sphincterotomy and stent placement;  Surgeon: Nima Barnard MD;  Location:  OR     ENDOSCOPIC RETROGRADE CHOLANGIOPANCREATOGRAM N/A 2/16/2021    Procedure: ENDOSCOPIC RETROGRADE CHOLANGIOPANCREATOGRAPHY WITH BILE DUCT STENT AND DEBRIS REMOVAL;  Surgeon: Nima Barnard MD;  Location:   OR     LAPAROSCOPIC CHOLECYSTECTOMY N/A 12/17/2020    Procedure: CHOLECYSTECTOMY, LAPAROSCOPIC;  Surgeon: Qasim Farah MD;  Location: [place] OR       ALLERGIES:  Patient has no known allergies.    MEDS:    Current Outpatient Medications:      aspirin 81 MG EC tablet, Take 81 mg by mouth daily, Disp: , Rfl:      atorvastatin (LIPITOR) 80 MG tablet, Take 80 mg by mouth daily, Disp: , Rfl:      famotidine (PEPCID) 20 MG tablet, Take 20 mg by mouth 2 times daily, Disp: , Rfl:      vitamin D3 (CHOLECALCIFEROL) 50 mcg (2000 units) tablet, Take 1 tablet by mouth daily Unsure of dose-over the counter, Disp: , Rfl:     SOCIAL HABITS:    History   Smoking Status     Light Smoker     Types: Cigarettes   Smokeless Tobacco     Never     Social History    Substance and Sexual Activity      Alcohol use: Not Currently      History   Drug Use Unknown       FAMILY HISTORY:  No family history on file.    PE:  /77   Pulse 97   Temp 96.8  F (36  C) (Tympanic)   Wt Readings from Last 1 Encounters:   03/13/23 76.2 kg (168 lb)     There is no height or weight on file to calculate BMI.    EXAM:  GENERAL: This is a well-developed 63 year old male who appears his stated age  EYES: Grossly normal.  MOUTH: Buccal mucosa normal   CARDIAC: Normal   CHEST/LUNG: Clear to auscultation bilaterally  GASTROINTESINAL soft nontender nondistended  MUSCULOSKELETAL: Grossly normal and both lower extremities are intact.  HEME/LYMPH: No lymphedema  NEUROLOGIC: Focally intact, Alert and oriented x 3.   PSYCH: appropriate affect  INTEGUMENT: No open lesions or ulcers  Pulse Exam: Barely palpable left femoral pulse.          DIAGNOSTIC STUDIES:     Images:  MR Brain w/o & w Contrast    Result Date: 3/13/2023  EXAM: MR BRAIN W/O and W CONTRAST LOCATION: [facility] DATE/TIME: 3/13/2023 11:03 PM INDICATION: Intermittent diplopia COMPARISON: 3/13/2023. CONTRAST: Gadavist 7.5 mL. TECHNIQUE: MRI brain without and with contrast.  FINDINGS: On the diffusion-weighted images there is no evidence of acute ischemia or restricted diffusion. There is no discrete mass lesion or midline shift. There is no acute extra-axial fluid collection or acute intraparenchymal hemorrhage. There are appropriate flow voids within the cavernous portions of the internal carotid arteries and the basilar artery. On the FLAIR and T2-weighted images there are scattered foci of high signal within the periventricular and subcortical white matter consistent with mild diffuse small vessel ischemic disease. There are old lacunar infarcts involving the cerebellar hemispheres bilaterally. Following the administration of contrast no abnormal enhancement is visualized. The ventricular system, basal cisterns and the cortical sulci are consistent with diffuse volume loss. There is no evidence of cerebellar tonsillar ectopia. The corpus callosum and the sella region have appropriate configuration and signal intensity for the patient's age. The orbit regions are unremarkable. There is no significant paranasal sinus disease.  There is trace fluid left mastoid air cells.     IMPRESSION: 1.  No discrete mass lesion, hemorrhage or focal area suggestive of acute infarct. 2. No abnormal enhancement. 3. Mild diffuse age related changes along with old lacunar infarcts cerebellar hemispheres bilaterally.    CTA Head Neck w Contrast    Result Date: 3/13/2023  EXAM: CT HEAD W/O CONTRAST, CTA HEAD NECK W CONTRAST LOCATION: [facility] DATE/TIME: 3/13/2023 9:06 PM INDICATION: Acute onset headache, diplopia. COMPARISON: None. CONTRAST: 75 mL Isovue 370 TECHNIQUE: Head and neck CT angiogram with IV contrast. Noncontrast head CT followed by axial helical CT images of the head and neck vessels obtained during the arterial phase of intravenous contrast administration. Axial 2D reconstructed images and multiplanar 3D MIP reconstructed images of the head and neck vessels were  performed by the technologist. Dose reduction techniques were used. All stenosis measurements made according to NASCET criteria unless otherwise specified. FINDINGS: NONCONTRAST HEAD CT: INTRACRANIAL CONTENTS: No intracranial hemorrhage, extraaxial collection, or mass effect.  No CT evidence of acute infarct. Small bilateral cerebellar infarcts, probably chronic. Mild presumed chronic small vessel ischemic changes. Mild generalized volume loss. No hydrocephalus. VISUALIZED ORBITS/SINUSES/MASTOIDS: No intraorbital abnormality. No paranasal sinus mucosal disease. No middle ear or mastoid effusion. BONES/SOFT TISSUES: No acute abnormality. HEAD CTA: ANTERIOR CIRCULATION: No stenosis/occlusion, aneurysm, or high flow vascular malformation. Standard [place] anatomy. POSTERIOR CIRCULATION: No stenosis/occlusion, aneurysm, or high flow vascular malformation. Balanced vertebral arteries supply a normal basilar artery. DURAL VENOUS SINUSES: Expected enhancement of the major dural venous sinuses. NECK CTA: RIGHT CAROTID: No measurable stenosis or dissection. LEFT CAROTID: No measurable stenosis or dissection. VERTEBRAL ARTERIES: No focal stenosis or dissection. Balanced vertebral arteries. AORTIC ARCH: Classic aortic arch anatomy with no significant stenosis at the origin of the great vessels. NONVASCULAR STRUCTURES: Incidental periapical abscess right central mandibular incisor.     IMPRESSION: HEAD CT: 1.  No CT evidence for acute intracranial process. 2.  Mild chronic microvascular ischemic changes as above. HEAD CTA: 1.  Normal CTA [place]. NECK CTA: 1.  Normal neck CTA.    CT Head w/o Contrast    Result Date: 3/13/2023  EXAM: CT HEAD W/O CONTRAST, CTA HEAD NECK W CONTRAST LOCATION: [facility] DATE/TIME: 3/13/2023 9:06 PM INDICATION: Acute onset headache, diplopia. COMPARISON: None. CONTRAST: 75 mL Isovue 370 TECHNIQUE: Head and neck CT angiogram with IV contrast. Noncontrast  head CT followed by axial helical CT images of the head and neck vessels obtained during the arterial phase of intravenous contrast administration. Axial 2D reconstructed images and multiplanar 3D MIP reconstructed images of the head and neck vessels were performed by the technologist. Dose reduction techniques were used. All stenosis measurements made according to NASCET criteria unless otherwise specified. FINDINGS: NONCONTRAST HEAD CT: INTRACRANIAL CONTENTS: No intracranial hemorrhage, extraaxial collection, or mass effect.  No CT evidence of acute infarct. Small bilateral cerebellar infarcts, probably chronic. Mild presumed chronic small vessel ischemic changes. Mild generalized volume loss. No hydrocephalus. VISUALIZED ORBITS/SINUSES/MASTOIDS: No intraorbital abnormality. No paranasal sinus mucosal disease. No middle ear or mastoid effusion. BONES/SOFT TISSUES: No acute abnormality. HEAD CTA: ANTERIOR CIRCULATION: No stenosis/occlusion, aneurysm, or high flow vascular malformation. Standard [place] anatomy. POSTERIOR CIRCULATION: No stenosis/occlusion, aneurysm, or high flow vascular malformation. Balanced vertebral arteries supply a normal basilar artery. DURAL VENOUS SINUSES: Expected enhancement of the major dural venous sinuses. NECK CTA: RIGHT CAROTID: No measurable stenosis or dissection. LEFT CAROTID: No measurable stenosis or dissection. VERTEBRAL ARTERIES: No focal stenosis or dissection. Balanced vertebral arteries. AORTIC ARCH: Classic aortic arch anatomy with no significant stenosis at the origin of the great vessels. NONVASCULAR STRUCTURES: Incidental periapical abscess right central mandibular incisor.     IMPRESSION: HEAD CT: 1.  No CT evidence for acute intracranial process. 2.  Mild chronic microvascular ischemic changes as above. HEAD CTA: 1.  Normal CTA [place]. NECK CTA: 1.  Normal neck CTA.          LABS:      Sodium   Date Value Ref Range Status   03/13/2023 132 (L) 136 -  145 mmol/L Final   02/16/2021 140 133 - 144 mmol/L Final   12/24/2020 136 133 - 144 mmol/L Final   12/22/2020 133 133 - 144 mmol/L Final     Urea Nitrogen   Date Value Ref Range Status   03/13/2023 9.1 8.0 - 23.0 mg/dL Final   02/16/2021 13 7 - 30 mg/dL Final   12/24/2020 17 7 - 30 mg/dL Final   12/22/2020 17 7 - 30 mg/dL Final     Hemoglobin   Date Value Ref Range Status   03/13/2023 16.1 13.3 - 17.7 g/dL Final   02/16/2021 15.5 13.3 - 17.7 g/dL Final   12/24/2020 14.5 13.3 - 17.7 g/dL Final   12/22/2020 16.0 13.3 - 17.7 g/dL Final     Platelet Count   Date Value Ref Range Status   03/13/2023 188 150 - 450 10e3/uL Final   02/16/2021 194 150 - 450 10e9/L Final   12/24/2020 282 150 - 450 10e9/L Final   12/22/2020 247 150 - 450 10e9/L Final     INR   Date Value Ref Range Status   02/16/2021 1.02 0.86 - 1.14 Final   12/24/2020 1.07 0.86 - 1.14 Final   12/22/2020 1.11 0.86 - 1.14 Final       45 minutes spent on the day of encounter doing chart review, history and exam, documentation, and further activities as noted.       Ivy Snider MD, [facility]  VASCULAR SURGERY

## 2023-03-23 NOTE — TELEPHONE ENCOUNTER
Faxed note to [facility] office at [phone].      Awaiting authorization to schedule angiogram.

## 2023-03-31 NOTE — TELEPHONE ENCOUNTER
Patient wondering if the [facility] has responded regarding angio approval; he states they may have been missing some information and spoke to Aguilar about it earlier this week. He will call [facility] as well to follow up.

## 2023-04-05 NOTE — TELEPHONE ENCOUNTER
Aguilar left message at [facility] to call back, service line is Interventional Radiology and was included on the original paperwork

## 2023-04-05 NOTE — TELEPHONE ENCOUNTER
Caller: Ghazala from the [facility]    Provider: MD Ivy Snider    Detailed reason for call: Ghazala is asking that we resubmit the request for service form and make sure to include what service lines are being requested.  She states the first request was denied because the service line requested was missing.  No call back needed unless we have further questions    Best phone number to contact: [phone] okay to speak to anyone that answers     Best time to contact: Monday-Friday 8:30-3:30

## 2023-04-06 NOTE — TELEPHONE ENCOUNTER
[facility] and they said RFS was still pending.  Updated pt over the phone - pt stated he spoke with someone at [facility] earlier this morning that said it was approved and ready to be scheduled.  Writer will follow up with [facility] if notification isn't received.      Pt has time held on schedule for angiogram to be scheduled on 4/28 @ [facility].

## 2023-04-10 NOTE — TELEPHONE ENCOUNTER
Received fax below from [facility]. Spoke to patient, he called his PCP today and everything has been approved for angiogram. Mailing out instructions to patient.

## 2023-04-12 NOTE — TELEPHONE ENCOUNTER
Confirmed with patient and [facility], ok to move up angiogram to 4/14/23.   [facility] authorization number [id number].    [facility] had asked us to call back if angio date changing, unable to reach them this afternoon, please try tomorrow morning. [phone]

## 2023-04-13 ENCOUNTER — TELEPHONE (OUTPATIENT)
Dept: VASCULAR SURGERY | Facility: CLINIC | Age: 63
End: 2023-04-13
Payer: MEDICAID

## 2023-04-14 ENCOUNTER — TELEPHONE (OUTPATIENT)
Dept: VASCULAR SURGERY | Facility: CLINIC | Age: 63
End: 2023-04-14
Payer: MEDICAID

## 2023-04-14 NOTE — TELEPHONE ENCOUNTER
Rescheduled pt's angiogram to 4/28/23 with a  9AM arrival time at [facility].     LMTCB to confirm details.

## 2023-04-25 RX ORDER — HEPARIN SODIUM 200 [USP'U]/100ML
1 INJECTION, SOLUTION INTRAVENOUS CONTINUOUS PRN
Status: DISCONTINUED | OUTPATIENT
Start: 2023-04-25 | End: 2023-04-29 | Stop reason: HOSPADM

## 2023-04-25 RX ORDER — NALOXONE HYDROCHLORIDE 0.4 MG/ML
0.2 INJECTION, SOLUTION INTRAMUSCULAR; INTRAVENOUS; SUBCUTANEOUS
Status: DISCONTINUED | OUTPATIENT
Start: 2023-04-25 | End: 2023-04-29 | Stop reason: HOSPADM

## 2023-04-25 RX ORDER — FLUMAZENIL 0.1 MG/ML
0.2 INJECTION, SOLUTION INTRAVENOUS
Status: DISCONTINUED | OUTPATIENT
Start: 2023-04-25 | End: 2023-04-29 | Stop reason: HOSPADM

## 2023-04-25 RX ORDER — FENTANYL CITRATE 50 UG/ML
25-50 INJECTION, SOLUTION INTRAMUSCULAR; INTRAVENOUS EVERY 5 MIN PRN
Status: DISCONTINUED | OUTPATIENT
Start: 2023-04-25 | End: 2023-04-29 | Stop reason: HOSPADM

## 2023-04-25 RX ORDER — NALOXONE HYDROCHLORIDE 0.4 MG/ML
0.4 INJECTION, SOLUTION INTRAMUSCULAR; INTRAVENOUS; SUBCUTANEOUS
Status: DISCONTINUED | OUTPATIENT
Start: 2023-04-25 | End: 2023-04-29 | Stop reason: HOSPADM

## 2023-04-27 ENCOUNTER — TELEPHONE (OUTPATIENT)
Dept: VASCULAR SURGERY | Facility: CLINIC | Age: 63
End: 2023-04-27
Payer: MEDICAID

## 2023-04-27 NOTE — TELEPHONE ENCOUNTER
Procedure: LLE Angiogram    Date: 4/28/23    Surgeon: MD Ivy Snider    Called patient to review upcoming procedure. Reviewed visitor allowance of 1 person at this time.     Discussed procedure with patients and instructions: Yes    Reviewed Post Procedure Follow up plan and Appts made: Yes    Reviewed Covid Test Scheduled/Completed: NA    Reviewed Blood Thinners and plan for holding, continuing and/or bridging:Other- do not hold aspirin    Reviewed Pre Op Appointment Required and Completed: N/A    Reviewed Allergies and if Contrast Allergy-Instructions and plan: N/A    Reviewed Arrival Time of 9am and procedure time of 10am and location of procedure [facility]:  [address] [place] [address] (phone: [phone], Fax: [phone])        All questions answered and number provided if any further questions arise or changes in patient status.

## 2023-04-28 ENCOUNTER — HOSPITAL ENCOUNTER (OUTPATIENT)
Dept: INTERVENTIONAL RADIOLOGY/VASCULAR | Facility: HOSPITAL | Age: 63
Discharge: HOME | End: 2023-04-28
Attending: SURGERY | Admitting: SURGERY
Payer: COMMERCIAL

## 2023-04-28 VITALS
SYSTOLIC BLOOD PRESSURE: 115 MMHG | RESPIRATION RATE: 18 BRPM | HEART RATE: 70 BPM | OXYGEN SATURATION: 97 % | TEMPERATURE: 97.8 F | DIASTOLIC BLOOD PRESSURE: 68 MMHG

## 2023-04-28 DIAGNOSIS — I73.9 PAD (PERIPHERAL ARTERY DISEASE) (H): Primary | ICD-10-CM

## 2023-04-28 PROCEDURE — 255N000002 HC RX 255 OP 636: Performed by: SURGERY

## 2023-04-28 PROCEDURE — 272N000121 HC CATH CR6

## 2023-04-28 PROCEDURE — 272N000500 HC NEEDLE CR2

## 2023-04-28 PROCEDURE — 99152 MOD SED SAME PHYS/QHP 5/>YRS: CPT

## 2023-04-28 PROCEDURE — 76937 US GUIDE VASCULAR ACCESS: CPT

## 2023-04-28 PROCEDURE — C1874 STENT, COATED/COV W/DEL SYS: HCPCS

## 2023-04-28 PROCEDURE — 272N000302 HC DEVICE INFLATION CR5

## 2023-04-28 PROCEDURE — 75625 CONTRAST EXAM ABDOMINL AORTA: CPT

## 2023-04-28 PROCEDURE — 272N000123 HC CATH CR9

## 2023-04-28 PROCEDURE — 75710 ARTERY X-RAYS ARM/LEG: CPT | Mod: LT

## 2023-04-28 PROCEDURE — 76937 US GUIDE VASCULAR ACCESS: CPT | Mod: 26 | Performed by: SURGERY

## 2023-04-28 PROCEDURE — 37221 HC REVASC ILIAC ART ANGIO-STENT INIT VESSEL: CPT

## 2023-04-28 PROCEDURE — 99152 MOD SED SAME PHYS/QHP 5/>YRS: CPT | Performed by: SURGERY

## 2023-04-28 PROCEDURE — C1769 GUIDE WIRE: HCPCS

## 2023-04-28 PROCEDURE — 37221 PR REVASC ILIAC ART, ANGIO/STENT, INIT VESSEL: CPT | Mod: LT | Performed by: SURGERY

## 2023-04-28 PROCEDURE — 250N000013 HC RX MED GY IP 250 OP 250 PS 637: Performed by: SURGERY

## 2023-04-28 PROCEDURE — C1725 CATH, TRANSLUMIN NON-LASER: HCPCS

## 2023-04-28 PROCEDURE — C1760 CLOSURE DEV, VASC: HCPCS

## 2023-04-28 PROCEDURE — 250N000009 HC RX 250: Performed by: SURGERY

## 2023-04-28 PROCEDURE — 272N000566 HC SHEATH CR3

## 2023-04-28 PROCEDURE — 250N000011 HC RX IP 250 OP 636: Performed by: SURGERY

## 2023-04-28 PROCEDURE — 75710 ARTERY X-RAYS ARM/LEG: CPT | Mod: 26 | Performed by: SURGERY

## 2023-04-28 RX ORDER — PROTAMINE SULFATE 10 MG/ML
40 INJECTION, SOLUTION INTRAVENOUS ONCE
Status: COMPLETED | OUTPATIENT
Start: 2023-04-28 | End: 2023-04-28

## 2023-04-28 RX ORDER — IODIXANOL 320 MG/ML
200 INJECTION, SOLUTION INTRAVASCULAR ONCE
Status: COMPLETED | OUTPATIENT
Start: 2023-04-28 | End: 2023-04-28

## 2023-04-28 RX ORDER — ACETAMINOPHEN 325 MG/1
650 TABLET ORAL EVERY 6 HOURS PRN
Status: DISCONTINUED | OUTPATIENT
Start: 2023-04-28 | End: 2023-04-29 | Stop reason: HOSPADM

## 2023-04-28 RX ORDER — LIDOCAINE 40 MG/G
CREAM TOPICAL
Status: DISCONTINUED | OUTPATIENT
Start: 2023-04-28 | End: 2023-04-29 | Stop reason: HOSPADM

## 2023-04-28 RX ORDER — CLOPIDOGREL BISULFATE 75 MG/1
75 TABLET ORAL DAILY
Qty: 42 TABLET | Refills: 0 | Status: SHIPPED | OUTPATIENT
Start: 2023-04-28 | End: 2023-05-18

## 2023-04-28 RX ORDER — SODIUM CHLORIDE 9 MG/ML
INJECTION, SOLUTION INTRAVENOUS CONTINUOUS
Status: DISCONTINUED | OUTPATIENT
Start: 2023-04-28 | End: 2023-04-29 | Stop reason: HOSPADM

## 2023-04-28 RX ORDER — HEPARIN SODIUM 1000 [USP'U]/ML
8000 INJECTION, SOLUTION INTRAVENOUS; SUBCUTANEOUS ONCE
Status: COMPLETED | OUTPATIENT
Start: 2023-04-28 | End: 2023-04-28

## 2023-04-28 RX ORDER — CLOPIDOGREL BISULFATE 75 MG/1
300 TABLET ORAL ONCE
Status: COMPLETED | OUTPATIENT
Start: 2023-04-28 | End: 2023-04-28

## 2023-04-28 RX ORDER — OXYCODONE HYDROCHLORIDE 5 MG/1
5 TABLET ORAL EVERY 4 HOURS PRN
Status: DISCONTINUED | OUTPATIENT
Start: 2023-04-28 | End: 2023-04-29 | Stop reason: HOSPADM

## 2023-04-28 RX ADMIN — HEPARIN SODIUM 8000 UNITS: 1000 INJECTION INTRAVENOUS; SUBCUTANEOUS at 10:49

## 2023-04-28 RX ADMIN — MIDAZOLAM 0.5 MG: 1 INJECTION INTRAMUSCULAR; INTRAVENOUS at 10:56

## 2023-04-28 RX ADMIN — MIDAZOLAM 1 MG: 1 INJECTION INTRAMUSCULAR; INTRAVENOUS at 10:41

## 2023-04-28 RX ADMIN — IODIXANOL 50 ML: 320 INJECTION, SOLUTION INTRAVASCULAR at 11:06

## 2023-04-28 RX ADMIN — MIDAZOLAM 1 MG: 1 INJECTION INTRAMUSCULAR; INTRAVENOUS at 10:37

## 2023-04-28 RX ADMIN — LIDOCAINE HYDROCHLORIDE 10 ML: 10 INJECTION, SOLUTION INFILTRATION; PERINEURAL at 10:39

## 2023-04-28 RX ADMIN — CLOPIDOGREL BISULFATE 300 MG: 75 TABLET ORAL at 12:51

## 2023-04-28 RX ADMIN — FENTANYL CITRATE 50 MCG: 50 INJECTION, SOLUTION INTRAMUSCULAR; INTRAVENOUS at 10:46

## 2023-04-28 RX ADMIN — FENTANYL CITRATE 25 MCG: 50 INJECTION, SOLUTION INTRAMUSCULAR; INTRAVENOUS at 11:01

## 2023-04-28 RX ADMIN — PROTAMINE SULFATE 40 MG: 10 INJECTION, SOLUTION INTRAVENOUS at 10:59

## 2023-04-28 RX ADMIN — FENTANYL CITRATE 50 MCG: 50 INJECTION, SOLUTION INTRAMUSCULAR; INTRAVENOUS at 10:39

## 2023-04-28 NOTE — PROGRESS NOTES
Pt discharged to home.  Bilateral groin sites soft and intact.  Pt denies any pain.  Instructions reviewed and acknowledged by patient and family. Reviewed safety procedure with family.

## 2023-04-28 NOTE — IP AVS SNAPSHOT
[facility] Interventional Radiology  [address] [place] [id number]  Phone: [phone]  Fax: [phone]                              After Visit Summary   4/28/2023    Iam Villanueva   MRN: [id number]           After Visit Summary Signature Page    I have received my discharge instructions, and my questions have been answered. I have discussed any challenges I see with this plan with the nurse or doctor.    ..........................................................................................................................................  Patient/Patient Representative Signature      ..........................................................................................................................................  Patient Representative Print Name and Relationship to Patient    ..................................................               ................................................  Date                                   Time    ..........................................................................................................................................  Reviewed by Signature/Title    ...................................................              ..............................................  Date                                               Time    22EPIC Rev 08/18

## 2023-04-28 NOTE — DISCHARGE INSTRUCTIONS
Angiogram Discharge Instructions:    You had an angiogram procedure. An angiogram is a procedure thatuses x-rays to take pictures of your blood vessels. A long, flexible tube or catheter is inserted through the blood stream (through the procedure site) to help deliver contrast (dye) into the arteries so they can be visibleon the x-ray. Angiograms are used to evaluate and treat possible blockages or other disease in the arterial system. Please follow the below instructions after your angiogram, including monitoring of your procedure site.    Care instructions after angiogram procedure:    - If you received sedation for your procedure, do not drive or operate heavy machinery for the rest of the day.    - Do not lift objects greater than 10 poundsfor 2 days following angiogram procedure.    - Avoid excessive exercise and straining for 2 days.    - Avoid tub baths, pools, hot tubs and Jacuzzis for 3 days or until procedure site is well healed.    - Youmay shower beginning tomorrow. Do not scrub procedure site until well healed; pat dry.    - Return to your normal activities as you tolerate after the 2 day restriction.    - You can expect to return to work 1-2days after your procedure - depending on the nature of your profession.    - It is normal to have some tenderness and minimal swelling at procedure puncture site. A small area of discoloration may be present.Tenderness typically subsides in 1-2 days. A small knot may also be present at puncture site for 6-8 weeks. This can be a normal part of the healing process.    Medications and other post-procedure care:    -If you had a blockage opened please make sure to fill your prescription for any new medication, such as Plavix, that you may have been prescribed and take it everyday    - If you have kidney function issues, please makesure to hydrate yourself well for the next two days by drinking lots of fluids to help clear your body of the dye used. If you have heart  problems such as heart failure, this may have to be moderated.    - If you are onMetformin, please do not resume it for 48hrs.    Follow up:    - Follow up with your vascular surgery team at the [facility] vascular center A follow up appointment should already be arranged for you.If you are unsure of your appointment or do not have a follow up appointment in the next 2-3 weeks, please call our office at [phone]. You will need to have an ultrasound 2-3 weeks after your angiogram and should bescheduled at the time of your follow up appt.    Further follow up will be based on ultrasound results. Typical follow up is every 3 months for the first year, then every 6 months to one year thereafter.    seek medical evaluation for:    - If you develop fevers (greater than 101 F (38.3C)).    - If you develop increasing pain, redness, purulent drainage, tenderness, or swelling at procedure site.    If you experience any bleeding from procedure/puncture site: lie down, firmly apply pressure to puncture site and call 911.    - Seek emergent evaluation if you experience any new leg/arm pain, discoloration ornumbness.    Call the vascular center at [phone] with questions/concerns or if you have any of the above symptoms.

## 2023-05-11 NOTE — PATIENT INSTRUCTIONS
Ankle-Brachial Index (MITCHELL) or Physiologic Test    Description  An ankle-brachial index test is relatively pain free. Blood pressure cuffs of various sizes are placed on your thigh, calf, foot and toes.  Similar to having your blood pressure checked with an arm cuff, as the technician inflates the cuffs, they progressively tighten and are then quickly released.  You may feel some discomfort, but generally for less than 60 seconds for each measurement. You will be asked to remove your socks and shoes and possibly your pants or shorts. Gowns will be provided. It usually takes about 30-60 minutes.   Depending on the initial readings and patient symptoms, you may be asked to perform a light walk on a treadmill.  The technician will apply ultrasound gel, usually warmed for your comfort, to your ankles and wrists. Through the gel, the technician will use a small hand-held device that emits sound waves.  Risks  There are typically no side effects or complications associated with a physiologic study.  How to Prepare  Eat and take medications as usual.  There is no preparation required for an ankle-brachial index (MITCHELL) or physiologic exam.  What Can I Expect After the Test?  The technician will send the ultrasound images to your vascular surgeon for evaluation. Typically, a report is available in 2-3 days. If anything critical is found, it is standard practice to notify the vascular surgeon immediately.  Reference: https://vascular.org/patient-resources/vascular-tests     Lower Extremity Arterial Ultrasound    Description  Ultrasound examinations are painless and easy for the patient. The vascular laboratory will contain a bed and just two or three pieces of equipment. You will be asked to remove pants or shorts and gowns will be provided. It usually takes about 30 minutes.  The technician will tuck a towel under your underpants in the groin. The gel is water-soluble and will not stain your skin or clothes.  Ultrasound  gel, usually warmed for your comfort, will be placed on the inner side of your legs.    Through the gel, the technician will apply to your legs a small hand-held device that emits sound waves.  When the test is completed, the technician will remove excess gel from your legs.    Risks  There are typically no side effects or complications associated with a lower extremity arterial duplex ultrasound.  How to Prepare  Eat and take medications as usual.  There is no preparation required for a lower extremity arterial duplex ultrasound.  What Can I Expect After the Test?  The technician will send the ultrasound images to your vascular surgeon for evaluation. Typically, a report is available in 2-3 days. If anything critical is found, it is standard practice to notify the vascular surgeon immediately.  Reference: https://vascular.org/patient-resources/vascular-tests

## 2023-05-16 ENCOUNTER — HOSPITAL ENCOUNTER (OUTPATIENT)
Dept: ULTRASOUND IMAGING | Facility: CLINIC | Age: 63
Discharge: HOME | End: 2023-05-16
Attending: SURGERY | Admitting: SURGERY
Payer: MEDICAID

## 2023-05-16 ENCOUNTER — OFFICE VISIT (OUTPATIENT)
Dept: VASCULAR SURGERY | Facility: CLINIC | Age: 63
End: 2023-05-16
Payer: COMMERCIAL

## 2023-05-16 ENCOUNTER — HOSPITAL ENCOUNTER (OUTPATIENT)
Dept: ULTRASOUND IMAGING | Facility: CLINIC | Age: 63
Discharge: HOME | End: 2023-05-16
Attending: SURGERY | Admitting: SURGERY
Payer: COMMERCIAL

## 2023-05-16 VITALS — SYSTOLIC BLOOD PRESSURE: 122 MMHG | DIASTOLIC BLOOD PRESSURE: 80 MMHG | HEART RATE: 80 BPM | TEMPERATURE: 97.3 F

## 2023-05-16 DIAGNOSIS — I73.9 PAD (PERIPHERAL ARTERY DISEASE) (H): Primary | ICD-10-CM

## 2023-05-16 DIAGNOSIS — I73.9 PAD (PERIPHERAL ARTERY DISEASE) (H): ICD-10-CM

## 2023-05-16 DIAGNOSIS — I73.9 CLAUDICATION OF LEFT LOWER EXTREMITY (H): ICD-10-CM

## 2023-05-16 PROBLEM — Z86.0100 HISTORY OF COLON POLYPS: Status: ACTIVE | Noted: 2021-10-20

## 2023-05-16 PROCEDURE — 93922 UPR/L XTREMITY ART 2 LEVELS: CPT

## 2023-05-16 PROCEDURE — 93926 LOWER EXTREMITY STUDY: CPT | Mod: LT

## 2023-05-16 PROCEDURE — 99214 OFFICE O/P EST MOD 30 MIN: CPT | Performed by: PHYSICIAN ASSISTANT

## 2023-05-16 ASSESSMENT — PAIN SCALES - GENERAL: PAINLEVEL: NO PAIN (0)

## 2023-05-16 NOTE — PROGRESS NOTES
VASCULAR SURGERY PROGRESS NOTE    LOCATION:  [facility]     Iam Villanueva  Medical Record #: [id number]  YOB: 1960  Age: 63 year old     Date of Service: 5/16/2023    [facility]    Reason for visit: Angiogram follow-up    IMPRESSION: 63-year-old male presenting for follow-up of peripheral arterial disease status post left lower extremity angiogram with common iliac artery stent placement due to lifestyle limiting claudication.  ABIs today are 1.12 on the right and 1.23 on the left with toe pressures well over 100.  Arterial duplex shows triphasic flow throughout the left lower extremity.  Patient's claudication has resolved and he denies any rest pain or nonhealing wounds.  Medically optimized on aspirin, plavix, and statin therapy.    RECOMMENDATION/RISKS: Continue best medical management with aspirin, plavix, and statin.  Plavix is to be continued for total of 6 weeks.  Encouraged regular activity/ambulation.  Follow-up as previously scheduled in 4 weeks with repeat lower extremity studies.    HPI:  Iam Villanueva is a 63 year old male with lifestyle-limiting claudication of the left lower extremity.  He underwent left lower extremity angiogram with left iliac stent placement on 4/28/23.  Patient was seen today for follow-up.  He is doing well and notes that his claudication has essentially resolved.  He denies any pain in his legs at rest or nonhealing wounds.  Mr. Villanueva is compliant with his medications.  Ultrasound results were discussed and all questions answered.  No other concerns    REVIEW OF SYSTEMS:    A 12 point ROS was reviewed and is negative except for what is listed above in HPI.    PHH:    Past Medical History:   Diagnosis Date     PVD (peripheral vascular disease) (H)       Past Surgical History:   Procedure Laterality Date     ENDOSCOPIC RETROGRADE CHOLANGIOPANCREATOGRAM N/A 12/23/2020    Procedure: ENDOSCOPIC RETROGRADE  CHOLANGIOPANCREATOGRAPHY, biliary sphincterotomy and stent placement;  Surgeon: Nima Barnard MD;  Location: [facility] OR     ENDOSCOPIC RETROGRADE CHOLANGIOPANCREATOGRAM N/A 2/16/2021    Procedure: ENDOSCOPIC RETROGRADE CHOLANGIOPANCREATOGRAPHY WITH BILE DUCT STENT AND DEBRIS REMOVAL;  Surgeon: Nima Barnard MD;  Location: [facility] OR     IR LOWER EXTREMITY ANGIOGRAM LEFT  4/28/2023     LAPAROSCOPIC CHOLECYSTECTOMY N/A 12/17/2020    Procedure: CHOLECYSTECTOMY, LAPAROSCOPIC;  Surgeon: Qasim Farah MD;  Location: [facility] OR     ALLERGIES:  Patient has no known allergies.    MEDS:    Current Outpatient Medications:      aspirin 81 MG EC tablet, Take 81 mg by mouth daily, Disp: , Rfl:      atorvastatin (LIPITOR) 80 MG tablet, Take 80 mg by mouth daily, Disp: , Rfl:      clopidogrel (PLAVIX) 75 MG tablet, Take 1 tablet (75 mg) by mouth daily Start taking medication the day after the procedure., Disp: 42 tablet, Rfl: 0     famotidine (PEPCID) 20 MG tablet, Take 20 mg by mouth 2 times daily, Disp: , Rfl:     SOCIAL HABITS:    History   Smoking Status     Light Smoker     Types: Cigarettes   Smokeless Tobacco     Never     Social History    Substance and Sexual Activity      Alcohol use: Not Currently      History   Drug Use Unknown     FAMILY HISTORY:  No family history on file.    PE:  /80   Pulse 80   Temp 97.3  F (36.3  C) (Tympanic)   Wt Readings from Last 1 Encounters:   03/13/23 76.2 kg (168 lb)     There is no height or weight on file to calculate BMI.    EXAM:  GENERAL: well-developed 63 year old male who appears his stated age  CARDIAC: normal   CHEST/LUNG: normal respiratory effort   MUSCULOSKELETAL: grossly normal and both lower extremities are intact, no lower extremity edema  NEUROLOGIC: focally intact, alert and oriented x 3  PSYCH: appropriate affect    DIAGNOSTIC STUDIES:     Images:    US Lower Extremity Arterial Duplex Left  US MITCHELL with PPG wo Exercise Bilateral    I personally reviewed the  images and my interpretation is ABIs are normal at 1.12 on the right and 1.23 on the left with toe pressures well over 100.  Arterial duplex shows triphasic flow throughout the left lower extremity.    LABS:      Sodium   Date Value Ref Range Status   04/14/2023 137 136 - 145 mmol/L Final   03/13/2023 132 (L) 136 - 145 mmol/L Final   02/16/2021 140 133 - 144 mmol/L Final   12/24/2020 136 133 - 144 mmol/L Final   12/22/2020 133 133 - 144 mmol/L Final     Urea Nitrogen   Date Value Ref Range Status   04/14/2023 9.0 8.0 - 23.0 mg/dL Final   03/13/2023 9.1 8.0 - 23.0 mg/dL Final   02/16/2021 13 7 - 30 mg/dL Final   12/24/2020 17 7 - 30 mg/dL Final   12/22/2020 17 7 - 30 mg/dL Final     Hemoglobin   Date Value Ref Range Status   03/13/2023 16.1 13.3 - 17.7 g/dL Final   02/16/2021 15.5 13.3 - 17.7 g/dL Final   12/24/2020 14.5 13.3 - 17.7 g/dL Final   12/22/2020 16.0 13.3 - 17.7 g/dL Final     Platelet Count   Date Value Ref Range Status   03/13/2023 188 150 - 450 10e3/uL Final   02/16/2021 194 150 - 450 10e9/L Final   12/24/2020 282 150 - 450 10e9/L Final   12/22/2020 247 150 - 450 10e9/L Final     INR   Date Value Ref Range Status   02/16/2021 1.02 0.86 - 1.14 Final   12/24/2020 1.07 0.86 - 1.14 Final   12/22/2020 1.11 0.86 - 1.14 Final     25 minutes spent on the day of encounter doing chart review, history and exam, documentation, and further activities as noted.     Lee Ann Kimball PA-C  VASCULAR SURGERY

## 2023-05-16 NOTE — NURSING NOTE
"Initial Temp 97.3  F (36.3  C) (Tympanic)  Estimated body mass index is 27.12 kg/m  as calculated from the following:    Height as of 3/13/23: 1.676 m (5' 6\").    Weight as of 3/13/23: 76.2 kg (168 lb). .    Kaycee Leach LPN on 5/16/2023 at 9:31 AM    "

## 2023-05-18 ENCOUNTER — TELEPHONE (OUTPATIENT)
Dept: VASCULAR SURGERY | Facility: CLINIC | Age: 63
End: 2023-05-18

## 2023-05-18 ENCOUNTER — OFFICE VISIT (OUTPATIENT)
Dept: FAMILY MEDICINE | Facility: CLINIC | Age: 63
End: 2023-05-18
Payer: MEDICAID

## 2023-05-18 VITALS
HEART RATE: 103 BPM | BODY MASS INDEX: 27.97 KG/M2 | DIASTOLIC BLOOD PRESSURE: 82 MMHG | RESPIRATION RATE: 14 BRPM | OXYGEN SATURATION: 96 % | HEIGHT: 66 IN | WEIGHT: 174 LBS | TEMPERATURE: 97.3 F | SYSTOLIC BLOOD PRESSURE: 127 MMHG

## 2023-05-18 DIAGNOSIS — L29.9 ITCHING: Primary | ICD-10-CM

## 2023-05-18 DIAGNOSIS — R60.1 GENERALIZED EDEMA: ICD-10-CM

## 2023-05-18 DIAGNOSIS — I73.9 PAD (PERIPHERAL ARTERY DISEASE) (H): Primary | ICD-10-CM

## 2023-05-18 DIAGNOSIS — I73.9 PVD (PERIPHERAL VASCULAR DISEASE) (H): ICD-10-CM

## 2023-05-18 LAB
ALBUMIN SERPL BCG-MCNC: 4.2 G/DL (ref 3.5–5.2)
ALP SERPL-CCNC: 93 U/L (ref 40–129)
ALT SERPL W P-5'-P-CCNC: 18 U/L (ref 10–50)
ANION GAP SERPL CALCULATED.3IONS-SCNC: 12 MMOL/L (ref 7–15)
AST SERPL W P-5'-P-CCNC: 27 U/L (ref 10–50)
BASOPHILS # BLD AUTO: 0 10E3/UL (ref 0–0.2)
BASOPHILS NFR BLD AUTO: 0 %
BILIRUB SERPL-MCNC: 1 MG/DL
BUN SERPL-MCNC: 10.3 MG/DL (ref 8–23)
CALCIUM SERPL-MCNC: 9.3 MG/DL (ref 8.8–10.2)
CHLORIDE SERPL-SCNC: 98 MMOL/L (ref 98–107)
CHOLEST SERPL-MCNC: 173 MG/DL
CREAT SERPL-MCNC: 0.97 MG/DL (ref 0.67–1.17)
CRP SERPL-MCNC: 15.21 MG/L
DEPRECATED HCO3 PLAS-SCNC: 23 MMOL/L (ref 22–29)
EOSINOPHIL # BLD AUTO: 0.1 10E3/UL (ref 0–0.7)
EOSINOPHIL NFR BLD AUTO: 1 %
ERYTHROCYTE [DISTWIDTH] IN BLOOD BY AUTOMATED COUNT: 12.2 % (ref 10–15)
ERYTHROCYTE [SEDIMENTATION RATE] IN BLOOD BY WESTERGREN METHOD: 8 MM/HR (ref 0–20)
GFR SERPL CREATININE-BSD FRML MDRD: 88 ML/MIN/1.73M2
GLUCOSE SERPL-MCNC: 117 MG/DL (ref 70–99)
HCT VFR BLD AUTO: 50.7 % (ref 40–53)
HDLC SERPL-MCNC: 39 MG/DL
HGB BLD-MCNC: 17.3 G/DL (ref 13.3–17.7)
IMM GRANULOCYTES # BLD: 0 10E3/UL
IMM GRANULOCYTES NFR BLD: 0 %
LDLC SERPL CALC-MCNC: 113 MG/DL
LYMPHOCYTES # BLD AUTO: 1.6 10E3/UL (ref 0.8–5.3)
LYMPHOCYTES NFR BLD AUTO: 17 %
MCH RBC QN AUTO: 30.5 PG (ref 26.5–33)
MCHC RBC AUTO-ENTMCNC: 34.1 G/DL (ref 31.5–36.5)
MCV RBC AUTO: 89 FL (ref 78–100)
MONOCYTES # BLD AUTO: 0.6 10E3/UL (ref 0–1.3)
MONOCYTES NFR BLD AUTO: 7 %
NEUTROPHILS # BLD AUTO: 6.8 10E3/UL (ref 1.6–8.3)
NEUTROPHILS NFR BLD AUTO: 74 %
NONHDLC SERPL-MCNC: 134 MG/DL
PLATELET # BLD AUTO: 207 10E3/UL (ref 150–450)
POTASSIUM SERPL-SCNC: 4.3 MMOL/L (ref 3.4–5.3)
PROT SERPL-MCNC: 7.9 G/DL (ref 6.4–8.3)
RBC # BLD AUTO: 5.68 10E6/UL (ref 4.4–5.9)
SODIUM SERPL-SCNC: 133 MMOL/L (ref 136–145)
TRIGL SERPL-MCNC: 103 MG/DL
WBC # BLD AUTO: 9.2 10E3/UL (ref 4–11)

## 2023-05-18 PROCEDURE — 36415 COLL VENOUS BLD VENIPUNCTURE: CPT | Performed by: NURSE PRACTITIONER

## 2023-05-18 PROCEDURE — 99214 OFFICE O/P EST MOD 30 MIN: CPT | Mod: 25 | Performed by: NURSE PRACTITIONER

## 2023-05-18 PROCEDURE — 80061 LIPID PANEL: CPT | Performed by: NURSE PRACTITIONER

## 2023-05-18 PROCEDURE — 85652 RBC SED RATE AUTOMATED: CPT | Performed by: NURSE PRACTITIONER

## 2023-05-18 PROCEDURE — 85025 COMPLETE CBC W/AUTO DIFF WBC: CPT | Performed by: NURSE PRACTITIONER

## 2023-05-18 PROCEDURE — 96372 THER/PROPH/DIAG INJ SC/IM: CPT | Performed by: NURSE PRACTITIONER

## 2023-05-18 PROCEDURE — 80053 COMPREHEN METABOLIC PANEL: CPT | Performed by: NURSE PRACTITIONER

## 2023-05-18 PROCEDURE — 86140 C-REACTIVE PROTEIN: CPT | Performed by: NURSE PRACTITIONER

## 2023-05-18 RX ORDER — ASPIRIN 325 MG
325 TABLET, DELAYED RELEASE (ENTERIC COATED) ORAL DAILY
COMMUNITY
Start: 2023-05-18 | End: 2023-06-13

## 2023-05-18 RX ORDER — TRIAMCINOLONE ACETONIDE 40 MG/ML
40 INJECTION, SUSPENSION INTRA-ARTICULAR; INTRAMUSCULAR ONCE
Status: COMPLETED | OUTPATIENT
Start: 2023-05-18 | End: 2023-05-18

## 2023-05-18 RX ORDER — DIPHENHYDRAMINE HCL 25 MG
50 CAPSULE ORAL ONCE
Status: COMPLETED | OUTPATIENT
Start: 2023-05-18 | End: 2023-05-18

## 2023-05-18 RX ORDER — HYDROXYZINE HYDROCHLORIDE 25 MG/1
25 TABLET, FILM COATED ORAL 3 TIMES DAILY PRN
Qty: 30 TABLET | Refills: 0 | Status: SHIPPED | OUTPATIENT
Start: 2023-05-18 | End: 2024-02-12

## 2023-05-18 RX ADMIN — Medication 50 MG: at 09:46

## 2023-05-18 RX ADMIN — TRIAMCINOLONE ACETONIDE 40 MG: 40 INJECTION, SUSPENSION INTRA-ARTICULAR; INTRAMUSCULAR at 09:46

## 2023-05-18 ASSESSMENT — PAIN SCALES - GENERAL: PAINLEVEL: NO PAIN (0)

## 2023-05-18 NOTE — LETTER
May 30, 2023      Iam Villanueva  [address] [facility] [address]        Dear ,    We are writing to inform you of your test results.    Normal red and white blood cell count   Mildly elevated inflammatory markers-CRP   Normal kidney function.  Normal liver function.  Improved from 2 months ago   Continue to avoid alcohol.  Smoking cessation encouraged   Cholesterol levels LDL still slightly elevated   Follow-up with your primary care provider to discuss other intervention strategies   Continue your atorvastatin   Mildly elevated blood sugar.  117.  Goal is to keep this less than 100 fasting   Follow-up with your primary care provider to discuss elevated blood sugars   Call with any questions or concerns   Thank you for allowing us to participate in your healthcare needs     Resulted Orders   Comprehensive metabolic panel (BMP + Alb, Alk Phos, ALT, AST, Total. Bili, TP)   Result Value Ref Range    Sodium 133 (L) 136 - 145 mmol/L    Potassium 4.3 3.4 - 5.3 mmol/L    Chloride 98 98 - 107 mmol/L    Carbon Dioxide (CO2) 23 22 - 29 mmol/L    Anion Gap 12 7 - 15 mmol/L    Urea Nitrogen 10.3 8.0 - 23.0 mg/dL    Creatinine 0.97 0.67 - 1.17 mg/dL    Calcium 9.3 8.8 - 10.2 mg/dL    Glucose 117 (H) 70 - 99 mg/dL    Alkaline Phosphatase 93 40 - 129 U/L    AST 27 10 - 50 U/L    ALT 18 10 - 50 U/L    Protein Total 7.9 6.4 - 8.3 g/dL    Albumin 4.2 3.5 - 5.2 g/dL    Bilirubin Total 1.0 <=1.2 mg/dL    GFR Estimate 88 >60 mL/min/1.73m2      Comment:      eGFR calculated using 2021 CKD-EPI equation.   ESR: Erythrocyte sedimentation rate   Result Value Ref Range    Erythrocyte Sedimentation Rate 8 0 - 20 mm/hr   CRP, inflammation   Result Value Ref Range    CRP Inflammation 15.21 (H) <5.00 mg/L   Lipid panel reflex to direct LDL Non-fasting   Result Value Ref Range    Cholesterol 173 <200 mg/dL    Triglycerides 103 <150 mg/dL    Direct Measure HDL 39 (L) >=40 mg/dL    LDL Cholesterol Calculated 113 (H) <=100 mg/dL    Non  HDL Cholesterol 134 (H) <130 mg/dL    Narrative    Cholesterol  Desirable:  <200 mg/dL    Triglycerides  Normal:  Less than 150 mg/dL  Borderline High:  150-199 mg/dL  High:  200-499 mg/dL  Very High:  Greater than or equal to 500 mg/dL    Direct Measure HDL  Female:  Greater than or equal to 50 mg/dL   Male:  Greater than or equal to 40 mg/dL    LDL Cholesterol  Desirable:  <100mg/dL  Above Desirable:  100-129 mg/dL   Borderline High:  130-159 mg/dL   High:  160-189 mg/dL   Very High:  >= 190 mg/dL    Non HDL Cholesterol  Desirable:  130 mg/dL  Above Desirable:  130-159 mg/dL  Borderline High:  160-189 mg/dL  High:  190-219 mg/dL  Very High:  Greater than or equal to 220 mg/dL   CBC with platelets and differential   Result Value Ref Range    WBC Count 9.2 4.0 - 11.0 10e3/uL    RBC Count 5.68 4.40 - 5.90 10e6/uL    Hemoglobin 17.3 13.3 - 17.7 g/dL    Hematocrit 50.7 40.0 - 53.0 %    MCV 89 78 - 100 fL    MCH 30.5 26.5 - 33.0 pg    MCHC 34.1 31.5 - 36.5 g/dL    RDW 12.2 10.0 - 15.0 %    Platelet Count 207 150 - 450 10e3/uL    % Neutrophils 74 %    % Lymphocytes 17 %    % Monocytes 7 %    % Eosinophils 1 %    % Basophils 0 %    % Immature Granulocytes 0 %    Absolute Neutrophils 6.8 1.6 - 8.3 10e3/uL    Absolute Lymphocytes 1.6 0.8 - 5.3 10e3/uL    Absolute Monocytes 0.6 0.0 - 1.3 10e3/uL    Absolute Eosinophils 0.1 0.0 - 0.7 10e3/uL    Absolute Basophils 0.0 0.0 - 0.2 10e3/uL    Absolute Immature Granulocytes 0.0 <=0.4 10e3/uL       If you have any questions or concerns, please call the clinic at the number listed above.       Sincerely,      BRIAN Da Silva CNP

## 2023-05-18 NOTE — TELEPHONE ENCOUNTER
Left message for Zonia and spoke with patient. Lee Ann Kimball said ok to stop taking plavix, and aspirin should be increased to 325 mg daily.

## 2023-05-18 NOTE — PROGRESS NOTES
"  Assessment & Plan     Itching  - Comprehensive metabolic panel (BMP + Alb, Alk Phos, ALT, AST, Total. Bili, TP)  - ESR: Erythrocyte sedimentation rate  - CRP, inflammation  - CBC with platelets and differential  - diphenhydrAMINE (BENADRYL) capsule 50 mg  - triamcinolone (KENALOG-40) injection 40 mg    Generalized edema  - Comprehensive metabolic panel (BMP + Alb, Alk Phos, ALT, AST, Total. Bili, TP)  - ESR: Erythrocyte sedimentation rate  - CRP, inflammation  - CBC with platelets and differential  - triamcinolone (KENALOG-40) injection 40 mg    PVD (peripheral vascular disease) (H)  Continue lipitor   Continue ASA  - Comprehensive metabolic panel (BMP + Alb, Alk Phos, ALT, AST, Total. Bili, TP)  - CBC with platelets and differential  - Lipid panel reflex to direct LDL Non-fasting    956}     Nicotine/Tobacco Cessation:  He reports that he has been smoking cigarettes. He has never used smokeless tobacco.  Nicotine/Tobacco Cessation Plan:   Information offered: Patient not interested at this time      BMI:   Estimated body mass index is 28.08 kg/m  as calculated from the following:    Height as of this encounter: 1.676 m (5' 6\").    Weight as of this encounter: 78.9 kg (174 lb).   Weight management plan: Discussed healthy diet and exercise guidelines    Work on weight loss  Regular exercise  Call or return to the clinic with any worsening of symptoms or no resolution. Patient/Parent verbalized understanding and is in agreement. Medication side effects reviewed.   Current Outpatient Medications   Medication Sig Dispense Refill     atorvastatin (LIPITOR) 80 MG tablet Take 80 mg by mouth daily       hydrOXYzine (ATARAX) 25 MG tablet Take 1 tablet (25 mg) by mouth 3 times daily as needed for itching 30 tablet 0     aspirin (ASA) 325 MG EC tablet Take 1 tablet (325 mg) by mouth daily       famotidine (PEPCID) 20 MG tablet Take 20 mg by mouth 2 times daily     Chart documentation with Dragon Voice recognition Software. " "Although reviewed after completion, some words and grammatical errors may remain.    See Patient Instructions    BRIAN Da Silva CNP  [facility]    Inocencia Vitale is a 63 year old, presenting for the following health issues:  Hand Pain (Bilateral hands and feet started yesterday )        5/18/2023     8:10 AM   Additional Questions   Roomed by Suha     Hand Pain    History of Present Illness       Reason for visit:  Swelling  Symptom onset:  Today    He eats 2-3 servings of fruits and vegetables daily.He consumes 1 sweetened beverage(s) daily.He exercises with enough effort to increase his heart rate 9 or less minutes per day.  He exercises with enough effort to increase his heart rate 3 or less days per week.   He is taking medications regularly.  Yesterday with hand swelling and rash started everywhere with severe intense itching  No new foods, clothes,laundry or body products.   New medications plavix 2 weeks since stent placement-4/28/2023 for PVD  Smoker.. not able to quit            Review of Systems   Constitutional, HEENT, cardiovascular, pulmonary, GI, , musculoskeletal, neuro, skin, endocrine and psych systems are negative, except as otherwise noted.      Objective    /82   Pulse 103   Temp 97.3  F (36.3  C) (Tympanic)   Resp 14   Ht 1.676 m (5' 6\")   Wt 78.9 kg (174 lb)   SpO2 99%   BMI 28.08 kg/m    Body mass index is 28.08 kg/m .  Physical Exam   GENERAL: healthy, alert and no distress  EYES: Eyes grossly normal to inspection, PERRL and conjunctivae and sclerae normal  HENT: ear canals and TM's normal, nose and mouth without ulcers or lesions  NECK: no adenopathy, no asymmetry, masses, or scars and thyroid normal to palpation  RESP: lungs clear to auscultation - no rales, rhonchi or wheezes  CV: regular rate and rhythm, normal S1 S2, no S3 or S4, no murmur, click or rub, no peripheral edema and peripheral pulses strong  ABDOMEN: soft, nontender, " no hepatosplenomegaly, no masses and bowel sounds normal  MS: no gross musculoskeletal defects noted, no edema  SKIN: swelling hands bilaterally  with pruritis   NEURO: Normal strength and tone, mentation intact and speech normal  PSYCH: mentation appears normal, affect normal/[facility] Visit on 04/14/2023   Component Date Value Ref Range Status     Sodium 04/14/2023 137  136 - 145 mmol/L Final     Potassium 04/14/2023 4.6  3.4 - 5.3 mmol/L Final     Chloride 04/14/2023 104  98 - 107 mmol/L Final     Carbon Dioxide (CO2) 04/14/2023 25  22 - 29 mmol/L Final     Anion Gap 04/14/2023 8  7 - 15 mmol/L Final     Urea Nitrogen 04/14/2023 9.0  8.0 - 23.0 mg/dL Final     Creatinine 04/14/2023 1.09  0.67 - 1.17 mg/dL Final     Calcium 04/14/2023 8.9  8.8 - 10.2 mg/dL Final     Glucose 04/14/2023 90  70 - 99 mg/dL Final     GFR Estimate 04/14/2023 77  >60 mL/min/1.73m2 Final    eGFR calculated using 2021 CKD-EPI equation.     Hold Specimen 04/14/2023 [facility]   Final

## 2023-05-18 NOTE — TELEPHONE ENCOUNTER
Caller: Provider Zonia Melgar NP     Provider: BERENICE Kimball    Detailed reason for call: Patient seen today. She feels he may be having an allergic reaction to Plavix. She is having him discontinue. Please advise an alternative. She will be in clinic but you can try her on her cell. Message ok. She is sending patient home but will follow up with him later to advise.    Best phone number to contact: [phone]    Best time to contact: any    Ok to leave a detailed message: Yes

## 2023-06-12 NOTE — PROGRESS NOTES
Patient is in clinic today to see MD Cooper Snider.      Patient is here for a post op to discuss 6 wk angio.    The patient does smoke.    Pt is currently taking ASA and statin.    The patient states {jmvassocks:871777}.    Swelling is observed in {jmvaslegs:349456}.    Pt rates pain {jmvaspain:924436} located at ***.    Pts symptoms include {jmvassx:476113} in {jmvasright :267339} extremity.    Patients condition is {jmvascon:064737}.    The provider has been notified that the patient {jmvasout:100579}.

## 2023-06-13 ENCOUNTER — HOSPITAL ENCOUNTER (OUTPATIENT)
Dept: ULTRASOUND IMAGING | Facility: CLINIC | Age: 63
Discharge: HOME | End: 2023-06-13
Attending: SURGERY
Payer: COMMERCIAL

## 2023-06-13 ENCOUNTER — OFFICE VISIT (OUTPATIENT)
Dept: VASCULAR SURGERY | Facility: CLINIC | Age: 63
End: 2023-06-13
Payer: MEDICAID

## 2023-06-13 VITALS — SYSTOLIC BLOOD PRESSURE: 129 MMHG | RESPIRATION RATE: 16 BRPM | DIASTOLIC BLOOD PRESSURE: 83 MMHG | HEART RATE: 84 BPM

## 2023-06-13 DIAGNOSIS — I73.9 CLAUDICATION OF LEFT LOWER EXTREMITY (H): ICD-10-CM

## 2023-06-13 DIAGNOSIS — I73.9 PAD (PERIPHERAL ARTERY DISEASE) (H): ICD-10-CM

## 2023-06-13 DIAGNOSIS — I73.9 PAD (PERIPHERAL ARTERY DISEASE) (H): Primary | ICD-10-CM

## 2023-06-13 PROCEDURE — 93926 LOWER EXTREMITY STUDY: CPT | Mod: LT

## 2023-06-13 PROCEDURE — 93922 UPR/L XTREMITY ART 2 LEVELS: CPT

## 2023-06-13 PROCEDURE — 99213 OFFICE O/P EST LOW 20 MIN: CPT | Performed by: SURGERY

## 2023-06-13 RX ORDER — ASPIRIN 81 MG/1
81 TABLET ORAL DAILY
Qty: 30 TABLET | Refills: 3 | Status: SHIPPED | OUTPATIENT
Start: 2023-06-13 | End: 2024-02-12

## 2023-06-13 NOTE — PROGRESS NOTES
VASCULAR SURGERY PROGRESS NOTE   VASCULAR SURGEON: Ivy Snider MD, RPVI     LOCATION:  [facility]     Iam Villanueva   Medical Record #:  [id number]  YOB: 1960  Age:  63 year old     Date of Service: 6/13/2023    PRIMARY CARE PROVIDER: [place], [facility]      Reason for visit: Follow-up    IMPRESSION: 63-year-old male status post left lower extremity angiogram with left common iliac artery stent placement.  Patient symptoms have resolved.  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    HPI:  Iam Villanueva is a 63 year old male who was seen today for follow-up and patient is doing well in regards and denies any complaints    REVIEW OF SYSTEMS:    A 12 point ROS was reviewed and is negative     PHH:    Past Medical History:   Diagnosis Date     PVD (peripheral vascular disease) (H)           Past Surgical History:   Procedure Laterality Date     ENDOSCOPIC RETROGRADE CHOLANGIOPANCREATOGRAM N/A 12/23/2020    Procedure: ENDOSCOPIC RETROGRADE CHOLANGIOPANCREATOGRAPHY, biliary sphincterotomy and stent placement;  Surgeon: Nima Barnard MD;  [facility]     ENDOSCOPIC RETROGRADE CHOLANGIOPANCREATOGRAM N/A 2/16/2021    Procedure: ENDOSCOPIC RETROGRADE CHOLANGIOPANCREATOGRAPHY WITH BILE DUCT STENT AND DEBRIS REMOVAL;  Surgeon: Nima Barnard MD;  [facility]     IR LOWER EXTREMITY ANGIOGRAM LEFT  4/28/2023     LAPAROSCOPIC CHOLECYSTECTOMY N/A 12/17/2020    Procedure: CHOLECYSTECTOMY, LAPAROSCOPIC;  Surgeon: Qasim Farah MD;  Location: [facility]       ALLERGIES:  Plavix [clopidogrel]    MEDS:    Current Outpatient Medications:      aspirin (ASA) 325 MG EC tablet, Take 1 tablet (325 mg) by mouth daily, Disp: , Rfl:      atorvastatin (LIPITOR) 80 MG tablet, Take 80 mg by mouth daily, Disp: , Rfl:      famotidine (PEPCID) 20 MG tablet, Take 20 mg by mouth 2 times daily,  Disp: , Rfl:      hydrOXYzine (ATARAX) 25 MG tablet, Take 1 tablet (25 mg) by mouth 3 times daily as needed for itching (Patient not taking: Reported on 6/13/2023), Disp: 30 tablet, Rfl: 0    SOCIAL HABITS:    History   Smoking Status     Light Smoker     Types: Cigarettes   Smokeless Tobacco     Never     Social History    Substance and Sexual Activity      Alcohol use: Not Currently      History   Drug Use Unknown       FAMILY HISTORY:  No family history on file.    PE:  /83 (BP Location: Right arm, Patient Position: Chair, Cuff Size: Adult Regular)   Pulse 84   Resp 16   Wt Readings from Last 1 Encounters:   05/18/23 78.9 kg (174 lb)     There is no height or weight on file to calculate BMI.    EXAM:  GENERAL: This is a well-developed 63 year old male who appears his stated age  EYES: Grossly normal.  MOUTH: Buccal mucosa normal   CARDIAC: Normal   CHEST/LUNG: Clear to auscultation bilaterally  GASTROINTESINAL soft nontender nondistended  MUSCULOSKELETAL: Grossly normal and both lower extremities are intact.  HEME/LYMPH: No lymphedema  NEUROLOGIC: Focally intact, Alert and oriented x 3.   PSYCH: appropriate affect  INTEGUMENT: No open lesions or ulcers            DIAGNOSTIC STUDIES:     Images:  US Lower Extremity Arterial Duplex Left    Result Date: 6/13/2023  US LOWER EXTREMITY ARTERIAL DUPLEX LEFT 6/13/2023 10:59 AM HISTORY: 63-year-old patient with history of peripheral arterial disease. COMPARISON: May 16, 2023. Patient had angiogram on April 28, 2023 with stent graft placement left common iliac artery. TECHNIQUE: Color Doppler and spectral waveform analysis obtained throughout the arteries of the left lower extremity. FINDINGS: The left common femoral, profunda femoral, superficial femoral, popliteal, posterior tibial, and peroneal arteries are patent with diffusely biphasic waveforms. Anterior tibial artery also patent. No velocity elevation to suggest stenosis.     IMPRESSION: Patent arteries  throughout the left lower extremity with diffusely biphasic waveforms, similar to previous. ELIEL KEYS MD   SYSTEM ID:  [id number]    US MITCHELL with PPG wo Exercise    Result Date: 6/13/2023  US MITCHELL WITH PPG WITHOUT EXERCISE BILATERAL   6/13/2023 10:30 AM HISTORY: PAD (peripheral artery disease) (H). COMPARISON: May 16, 2023 FINDINGS: Right MITCHELL: PT: 150, index of 1.07, previously 1.12 DP: 156, index of 1.11, previously 1.06 Left MITCHELL: PT: 167, index of 1.19, previously 1.23 DP: 165, index of 1.18, previously 1.18. Right Digital brachial index: 114, index of 0.81 Left Digital Brachial index: 122, index of 0.87 Waveforms: Distal posterior tibial and dorsalis pedis waveforms are triphasic bilaterally. Both digital waveforms appear intact.     IMPRESSION: Normal MITCHELL examination. MITCHELL CRITERIA: >1.4 NC 0.95-1.4 Normal 0.90 - 0.94 Mild 0.5 - 0.89 Moderate 0.2 - 0.49 Severe <0.2 Critical ELIEL KEYS MD   SYSTEM ID:  [id number]     Lower Extremity Arterial Duplex Left    Result Date: 5/16/2023   LOWER EXTREMITY ARTERIAL DUPLEX LEFT 5/16/2023 8:55 AM HISTORY: 63-year-old patient with history of peripheral arterial disease and claudication in the left lower extremity. COMPARISON: None TECHNIQUE: Color Doppler and spectral waveform analysis obtained throughout the arteries of the left lower extremity. FINDINGS: The common femoral, profunda femoral, superficial femoral, popliteal, posterior tibial, peroneal, and anterior tibial arteries are patent with diffusely biphasic waveforms. No visible occlusion. No velocity elevation to suggest stenosis.     IMPRESSION: Patent arteries throughout the left lower extremity. ELIEL KEYS MD   SYSTEM ID:  [id number]    US MITCHELL with PPG wo Exercise Bilateral    Result Date: 5/16/2023  US MITCHELL WITH PPG W/O EXERCISE BILAT   5/16/2023 8:25 AM HISTORY: PAD (peripheral artery disease) (H); Claudication of left lower extremity (H). COMPARISON: None. FINDINGS: Right MITCHELL: PT: 144, index of 1.12.  DP: 137, index of 1.06 Left MITCHELL: PT: 159, index 1.23. DP: 152, index of 1.18 Right Digital brachial index: 120, index 1.0. Left Digital Brachial index: 154, index of 1.2 Waveforms: Distal posterior tibial and dorsalis pedis waveforms are triphasic bilaterally. Left first digital waveform is normal. Right first digital waveform is diminished in amplitude and morphology, not corresponding with toe brachial index.     IMPRESSION: 1. Normal right lower extremity MITCHELL and TBI. 2. Normal right PT and DP waveforms, though digital waveform is diminished. This is inconsistent with other findings and uncertain if technical or if represents localized arterial insufficiency to the foot/digits. 3. Normal examination left lower extremity. MITCHELL CRITERIA: >1.4 NC 0.95-1.4 Normal 0.90 - 0.94 Mild 0.5 - 0.89 Moderate 0.2 - 0.49 Severe <0.2 Critical ELIEL KEYS MD   SYSTEM ID:  [id number]          LABS:      Sodium   Date Value Ref Range Status   05/18/2023 133 (L) 136 - 145 mmol/L Final   04/14/2023 137 136 - 145 mmol/L Final   03/13/2023 132 (L) 136 - 145 mmol/L Final   02/16/2021 140 133 - 144 mmol/L Final   12/24/2020 136 133 - 144 mmol/L Final   12/22/2020 133 133 - 144 mmol/L Final     Urea Nitrogen   Date Value Ref Range Status   05/18/2023 10.3 8.0 - 23.0 mg/dL Final   04/14/2023 9.0 8.0 - 23.0 mg/dL Final   03/13/2023 9.1 8.0 - 23.0 mg/dL Final   02/16/2021 13 7 - 30 mg/dL Final   12/24/2020 17 7 - 30 mg/dL Final   12/22/2020 17 7 - 30 mg/dL Final     Hemoglobin   Date Value Ref Range Status   05/18/2023 17.3 13.3 - 17.7 g/dL Final   03/13/2023 16.1 13.3 - 17.7 g/dL Final   02/16/2021 15.5 13.3 - 17.7 g/dL Final   12/24/2020 14.5 13.3 - 17.7 g/dL Final   12/22/2020 16.0 13.3 - 17.7 g/dL Final     Platelet Count   Date Value Ref Range Status   05/18/2023 207 150 - 450 10e3/uL Final   03/13/2023 188 150 - 450 10e3/uL Final   02/16/2021 194 150 - 450 10e9/L Final   12/24/2020 282 150 - 450 10e9/L Final   12/22/2020 247 150 - 450  10e9/L Final     INR   Date Value Ref Range Status   02/16/2021 1.02 0.86 - 1.14 Final   12/24/2020 1.07 0.86 - 1.14 Final   12/22/2020 1.11 0.86 - 1.14 Final       30 minutes spent on the day of encounter doing chart review, history and exam, documentation, and further activities as noted.       Ivy Snider MD, [facility]  VASCULAR SURGERY

## 2023-06-13 NOTE — NURSING NOTE
"Initial /83 (BP Location: Right arm, Patient Position: Chair, Cuff Size: Adult Regular)   Pulse 84   Resp 16  Estimated body mass index is 28.08 kg/m  as calculated from the following:    Height as of 5/18/23: 1.676 m (5' 6\").    Weight as of 5/18/23: 78.9 kg (174 lb). .    Patient is here for a 6 week post op angio , offers no complaints.  ana wilson LPN    "

## 2023-06-13 NOTE — PATIENT INSTRUCTIONS
Syed Vitale,    Thank you for entrusting your care with us today. After your visit today with Dr. Snider this is the plan that was discussed at your appointment.    Repeat MITCHELL's and CTA for renal artery aneurysm in one year    See Lee Ann NG virtually or in person after that visit.    Continue on best medical management of aspirin and statin.    I am including additional information on these things and our contact information if you have any questions or concerns.   Please do not hesitate to reach out to us if you felt we did not answer your questions or you are unsure of the treatment plan after your visit today. Our number is [phone].Thank you for trusting us with your care.         Again thank you for your time.     Understanding Peripheral Arterial Disease       Peripheral arteries deliver oxygen-rich blood to the tissues outside the heart. As you age, your arteries become stiffer and thicker. In addition, risk factors, such as smoking and high cholesterol, can damage the artery lining. This allows a buildup of fat and other materials (plaque) to form within the artery walls. The buildup of plaque narrows the space inside the artery and sometimes blocks blood flow. Peripheral arterial disease (PAD) happens when blood flow through the arteries is reduced because of plaque buildup. It often happens in the legs and feet, but can also happen elsewhere in the body. If this buildup happens in the large artery in the neck (carotid artery), it can lead to stroke.      A healthy artery  An artery is a muscular tube that carries oxygen rich blood and nutrients from the heart to the rest of the body. It has a smooth lining and flexible walls that allow blood to pass freely. When active, muscles need more oxygen. This increases blood flow. Healthy arteries can adapt to meet this need.      A damaged artery  PAD starts when the lining of an artery is damaged. This is often because of risk factors, such as smoking,  older age, or diabetes. Plaque then starts to form within the artery wall. At this stage, blood flows normally, so you re not likely to have symptoms.      A narrowed artery  If plaque continues to build up, the space inside the artery narrows. The artery walls become less able to expand. The artery still provides enough blood and oxygen to your muscles during rest. But when you re active, the increased demand for blood can t be met. As a result, your leg may cramp or ache when you walk.      A blocked artery  An artery can become blocked by plaque or by a blood clot lodged in a narrowed section. When this happens, oxygen can t reach the muscle below the blockage. Then you may feel pain when lying down or when you are not active (rest pain). This type of pain is especially common at night when you re lying flat. In time, the affected tissue can die. This can lead to the loss of a toe or foot.        8727-0485 [employer] [address] [place] [address], [place] [address]. All rights reserved. This information is not intended as a substitute for professional medical care. Always follow your healthcare professional's instructions.

## 2023-12-07 ENCOUNTER — TRANSFERRED RECORDS (OUTPATIENT)
Dept: HEALTH INFORMATION MANAGEMENT | Facility: CLINIC | Age: 63
End: 2023-12-07

## 2023-12-24 ENCOUNTER — HOSPITAL ENCOUNTER (EMERGENCY)
Facility: CLINIC | Age: 63
Discharge: HOME | End: 2023-12-24
Attending: FAMILY MEDICINE | Admitting: FAMILY MEDICINE
Payer: COMMERCIAL

## 2023-12-24 VITALS
RESPIRATION RATE: 16 BRPM | HEART RATE: 87 BPM | SYSTOLIC BLOOD PRESSURE: 121 MMHG | WEIGHT: 183 LBS | OXYGEN SATURATION: 97 % | DIASTOLIC BLOOD PRESSURE: 77 MMHG | HEIGHT: 66 IN | TEMPERATURE: 97.5 F | BODY MASS INDEX: 29.41 KG/M2

## 2023-12-24 DIAGNOSIS — G57.12 MERALGIA PARESTHETICA OF LEFT SIDE: ICD-10-CM

## 2023-12-24 DIAGNOSIS — L30.1 DYSHYDROSIS: ICD-10-CM

## 2023-12-24 DIAGNOSIS — I73.9 PAD (PERIPHERAL ARTERY DISEASE) (H): ICD-10-CM

## 2023-12-24 DIAGNOSIS — I73.9 CLAUDICATION (H): ICD-10-CM

## 2023-12-24 LAB
ALBUMIN UR-MCNC: NEGATIVE MG/DL
APPEARANCE UR: CLEAR
BILIRUB UR QL STRIP: NEGATIVE
COLOR UR AUTO: ABNORMAL
GLUCOSE UR STRIP-MCNC: NEGATIVE MG/DL
HGB UR QL STRIP: ABNORMAL
KETONES UR STRIP-MCNC: NEGATIVE MG/DL
LEUKOCYTE ESTERASE UR QL STRIP: NEGATIVE
NITRATE UR QL: NEGATIVE
PH UR STRIP: 6 [PH] (ref 5–7)
RBC URINE: 0 /HPF
SP GR UR STRIP: 1 (ref 1–1.03)
UROBILINOGEN UR STRIP-MCNC: NORMAL MG/DL
WBC URINE: 0 /HPF

## 2023-12-24 PROCEDURE — 99283 EMERGENCY DEPT VISIT LOW MDM: CPT | Performed by: FAMILY MEDICINE

## 2023-12-24 PROCEDURE — 81001 URINALYSIS AUTO W/SCOPE: CPT | Performed by: FAMILY MEDICINE

## 2023-12-24 ASSESSMENT — ACTIVITIES OF DAILY LIVING (ADL): ADLS_ACUITY_SCORE: 35

## 2023-12-24 NOTE — ED NOTES
Pt arrived via Triage, ambulatory.  Pt with 3 day h/o bilat Leg tingling and redness to feet.  L>R.  Pt report bending over the bar 3 nights ago, hitting his low abd. Having severe pain at that time.   Since then pt with s/s reported above.  Pt has had a stent placed in his artery L leg approx 4 months ago per pt.  Pt thinks the stent may have moved.  Pt has been drinking tonight and is difficult to follow at time.     Abd:  Obese, firm, and pain when he pushes on the pelvic area.  No abrasions, or contusion noted.  Pt denies issues with bowel or bladder, voiding fine and no blood noted.      R & L LE:  +3 pulses in femoral, DP and PT.  Bottom of bilat feet bright read.     BP;s done on all extremities and place on grease board for MD.    PLAN:  Will await MD assessment and orders.

## 2023-12-24 NOTE — ED TRIAGE NOTES
States 7 months ago had stents paced in his groin. Pt not sure where. 3 days ago he leaned on a table and now has numbness in both legs.

## 2023-12-24 NOTE — DISCHARGE INSTRUCTIONS
ICD-10-CM    1. PAD (peripheral artery disease) (H24)  I73.9 US MITCHELL Doppler with Exercise Bilateral      2. Claudication (H24)  I73.9 US MITCHELL Doppler with Exercise Bilateral    some sytmptoms of numbness in feet and the coolness could be a worsening of the PAD despite stenting.  fpollow-up for MITCHELL      3. Meralgia paresthetica of left side  G57.12     suspect meralgia, but lumbar spine disc at L3-4 can also cause this. avoid compression at the thigh.      4. Dyshydrosis  L30.1     use an emollient such as vaseline or eucerin or use the bag balm you in am and night

## 2023-12-24 NOTE — ED NOTES
Per MD orders pt bladder scanned for 240-245 ml.   Pt then voided 350ml of light clear urine.  PLAN:  Will update MD.

## 2023-12-25 NOTE — ED PROVIDER NOTES
History     Chief Complaint   Patient presents with    Post-op Problem     HPI  Iam Villanueva is a 63 year old male who has a more complicated past history of peripheral arterial disease having undergone a left common iliac artery stent.  [facility]   he also has a history of a prior bile leak after cholecystectomy.  Also history with alcohol abuse and had been seen for alcohol intoxication last spring.  [facility].    He is here with several concerns today.  He noted that as he sat forward and leaning forward he would experience a pain that was in his lateral thigh on the left side.   He was concerned that this could be related to his prior stenting of the lower extremities.  His legs have been more cool bilaterally.  He is at times had numbness more distally.  He also noted a scaling of bilateral lower feet and wondered about this as well.  He has no significant claudication-like symptoms now.  Ambulation does not significantly worsen any of his symptoms currently.  But he notes some of the symptoms in the more distal legs are more similar to what he experienced prior to his iliac artery stenting.  Although only his left side was stented, he noted a overall improvement in lower extremity symptoms in  both legs.      Allergies:  Allergies   Allergen Reactions    Plavix [Clopidogrel] Swelling       Problem List:    Patient Active Problem List    Diagnosis Date Noted    History of colon polyps 10/20/2021     Priority: Medium    Bile leak 12/22/2020     Priority: Medium    Acute cholecystitis 12/16/2020     Priority: Medium        Past Medical History:    Past Medical History:   Diagnosis Date    PVD (peripheral vascular disease) (H)        Past Surgical History:    Past Surgical History:   Procedure Laterality Date    ENDOSCOPIC RETROGRADE CHOLANGIOPANCREATOGRAM N/A 12/23/2020    Procedure: ENDOSCOPIC RETROGRADE CHOLANGIOPANCREATOGRAPHY, biliary sphincterotomy and stent  "placement;  Surgeon: Nima Barnard MD;  [facility]    ENDOSCOPIC RETROGRADE CHOLANGIOPANCREATOGRAM N/A 2/16/2021    Procedure: ENDOSCOPIC RETROGRADE CHOLANGIOPANCREATOGRAPHY WITH BILE DUCT STENT AND DEBRIS REMOVAL;  Surgeon: Nima Barnard MD;  [facility]    IR LOWER EXTREMITY ANGIOGRAM LEFT  4/28/2023    LAPAROSCOPIC CHOLECYSTECTOMY N/A 12/17/2020    Procedure: CHOLECYSTECTOMY, LAPAROSCOPIC;  Surgeon: Qasim Farah MD;  Location: [facility]       Family History:    No family history on file.    Social History:  Marital Status:   [2]  Social History     Tobacco Use    Smoking status: Light Smoker     Types: Cigarettes    Smokeless tobacco: Never   Vaping Use    Vaping Use: Every day   Substance Use Topics    Alcohol use: Not Currently    Drug use: Never        Medications:    aspirin 81 MG EC tablet  atorvastatin (LIPITOR) 80 MG tablet  famotidine (PEPCID) 20 MG tablet  hydrOXYzine (ATARAX) 25 MG tablet          Review of Systems    ROS:  5 point ROS negative except as noted above in HPI, including Gen., Resp., CV, GI &  system review.    Physical Exam   BP: (!) 144/81  Pulse: 89  Temp: 97.5  F (36.4  C)  Resp: 16  Height: 167.6 cm (5' 6\")  Weight: 83 kg (183 lb)  SpO2: 96 %      Physical Exam  Constitutional:       General: He is in acute distress.      Appearance: He is not diaphoretic.   Eyes:      Conjunctiva/sclera: Conjunctivae normal.   Cardiovascular:      Rate and Rhythm: Normal rate.   Pulmonary:      Effort: No respiratory distress.      Breath sounds: No stridor.   Abdominal:      General: Abdomen is flat. There is no distension.      Palpations: Abdomen is soft. There is no mass.      Tenderness: There is no abdominal tenderness. There is no guarding.   Musculoskeletal:      Cervical back: Neck supple.      Right lower leg: No edema.      Left lower leg: No edema.   Skin:     Coloration: Skin is not pale.      Findings: No rash.   Neurological:      Mental Status: He is " alert.       Bilateral dorsalis pedis posterior tibial pulses are palpable.  Capillary refill is 2-3 seconds distally.  No significant pallor.  Both foot are cool but symmetrically so.      His low back is with full range of motion no midline tenderness to palpation.          ED Course                 Procedures              Critical Care time:  none               Results for orders placed or performed during the hospital encounter of 12/24/23 (from the past 24 hour(s))   UA with Microscopic reflex to Culture    Specimen: Urine, Midstream   Result Value Ref Range    Color Urine Straw Colorless, Straw, Light Yellow, Yellow    Appearance Urine Clear Clear    Glucose Urine Negative Negative mg/dL    Bilirubin Urine Negative Negative    Ketones Urine Negative Negative mg/dL    Specific Gravity Urine 1.004 1.003 - 1.035    Blood Urine Small (A) Negative    pH Urine 6.0 5.0 - 7.0    Protein Albumin Urine Negative Negative mg/dL    Urobilinogen Urine Normal Normal, 2.0 mg/dL    Nitrite Urine Negative Negative    Leukocyte Esterase Urine Negative Negative    RBC Urine 0 <=2 /HPF    WBC Urine 0 <=5 /HPF    Narrative    Urine Culture not indicated       Medications - No data to display    Assessments & Plan (with Medical Decision Making)     MDM: Iam Villanueva is a 63 year old male good fell some fullness in his lower abdomen initially and then when he leaned forward felt a pain in the lateral thigh.  There is no significant abdominal pain.  He has no signs of urinary retention after bladder scan he emptied his bladder well.  He has no urinary tract infection.  I do suspect his lateral thigh discomfort is meralgia paresthetica and we discussed how this occurs and precautions.  He does have a history of peripheral arterial disease and his lower extremities are cooler and he notes some of the symptoms had been present that he has now before he had his stenting.  At this point I see no findings of critical limb ischemia but  given his history I would recommend that he undergo another MITCHELL and following up either with his vascular surgeon or his primary provider to start with.  At this time I see no serious findings he required no laboratory testing.  I given him precautions for return.    I have reviewed the nursing notes.    I have reviewed the findings, diagnosis, plan and need for follow up with the patient.           Medical Decision Making  The patient's presentation was of moderate complexity (an undiagnosed new problem with uncertain diagnosis).    The patient's evaluation involved:  ordering and/or review of 2 test(s) in this encounter (see separate area of note for details)    The patient's management necessitated only low risk treatment.        Discharge Medication List as of 12/24/2023  5:33 PM          Final diagnoses:   PAD (peripheral artery disease) (H24)   Claudication (H24) - some sytmptoms of numbness in feet and the coolness could be a worsening of the PAD despite stenting.  fpollow-up for MITCHELL   Meralgia paresthetica of left side - suspect meralgia, but lumbar spine disc at L3-4 can also cause this. avoid compression at the thigh.   Dyshydrosis - use an emollient such as vaseline or eucerin or use the bag balm you in am and night       12/24/2023   [facility] EMERGENCY DEPT       Fredy Almonte MD  12/24/23 [id number]

## 2024-01-12 ENCOUNTER — TELEPHONE (OUTPATIENT)
Dept: FAMILY MEDICINE | Facility: CLINIC | Age: 64
End: 2024-01-12
Payer: MEDICAID

## 2024-01-12 NOTE — TELEPHONE ENCOUNTER
Form completed  See yellow folder  Please fax and scan copy to chart.  Thanks Zonia Melgar FNP-BC

## 2024-01-12 NOTE — TELEPHONE ENCOUNTER
[facility] calling to F/U on request for services form that was faxed to Zonia Melgar NP, 1/8.   States pt had ED visit 12/24/23 [facility], needs F/U-    Assessments & Plan (with Medical Decision Making)      MDM: Iam Villanueva is a 63 year old male good fell some fullness in his lower abdomen initially and then when he leaned forward felt a pain in the lateral thigh.  There is no significant abdominal pain.  He has no signs of urinary retention after bladder scan he emptied his bladder well.  He has no urinary tract infection.  I do suspect his lateral thigh discomfort is meralgia paresthetica and we discussed how this occurs and precautions.  He does have a history of peripheral arterial disease and his lower extremities are cooler and he notes some of the symptoms had been present that he has now before he had his stenting.  At this point I see no findings of critical limb ischemia but given his history I would recommend that he undergo another MITCHELL and following up either with his vascular surgeon or his primary provider to start with.  At this time I see no serious findings he required no laboratory testing.  I given him precautions for return.     Pt requesting to establish primary care with Zonia Melgar NP.  Form placed in Zonia's yellow folder for review, completion.  TELLY Peñaloza RN

## 2024-01-19 NOTE — TELEPHONE ENCOUNTER
[facility] calling to attempt to assist scheduling patient for follow up appointment/establishing care.     Gaston attempted to phone in patient to get this scheduled. Patient did not answer. They will try to call clinic back at a later time with patient.       Bianca Guevara RN on 1/19/2024 at 4:41 PM

## 2024-02-12 ENCOUNTER — OFFICE VISIT (OUTPATIENT)
Dept: FAMILY MEDICINE | Facility: CLINIC | Age: 64
End: 2024-02-12
Payer: MEDICAID

## 2024-02-12 VITALS
WEIGHT: 181.6 LBS | HEART RATE: 84 BPM | RESPIRATION RATE: 22 BRPM | OXYGEN SATURATION: 96 % | BODY MASS INDEX: 29.18 KG/M2 | HEIGHT: 66 IN | TEMPERATURE: 97.6 F | SYSTOLIC BLOOD PRESSURE: 110 MMHG | DIASTOLIC BLOOD PRESSURE: 70 MMHG

## 2024-02-12 DIAGNOSIS — K21.9 GASTROESOPHAGEAL REFLUX DISEASE, UNSPECIFIED WHETHER ESOPHAGITIS PRESENT: ICD-10-CM

## 2024-02-12 DIAGNOSIS — I73.9 PAD (PERIPHERAL ARTERY DISEASE) (H): Primary | ICD-10-CM

## 2024-02-12 DIAGNOSIS — I73.9 CLAUDICATION OF LEFT LOWER EXTREMITY (H): ICD-10-CM

## 2024-02-12 PROCEDURE — 99214 OFFICE O/P EST MOD 30 MIN: CPT | Performed by: NURSE PRACTITIONER

## 2024-02-12 RX ORDER — ASPIRIN 81 MG/1
81 TABLET ORAL DAILY
Qty: 30 TABLET | Refills: 3 | Status: SHIPPED | OUTPATIENT
Start: 2024-02-12

## 2024-02-12 RX ORDER — FAMOTIDINE 20 MG/1
20 TABLET, FILM COATED ORAL 2 TIMES DAILY
Qty: 180 TABLET | Refills: 3 | Status: SHIPPED | OUTPATIENT
Start: 2024-02-12

## 2024-02-12 ASSESSMENT — PAIN SCALES - GENERAL: PAINLEVEL: NO PAIN (0)

## 2024-02-12 NOTE — PROGRESS NOTES
"  Assessment & Plan     PAD (peripheral artery disease) (H24)  Claudication of left lower extremity (H24)  Chronic, follows vascular. Taking aspirin daily. Due for recheck with vascular medicine in June of this year ~ schedule. Continue all medications without any changes.   - aspirin 81 MG EC tablet; Take 1 tablet (81 mg) by mouth daily    Gastroesophageal reflux disease, unspecified whether esophagitis present  Chronic on Pepcid. Continue without any changes.   - famotidine (PEPCID) 20 MG tablet; Take 1 tablet (20 mg) by mouth 2 times daily        BMI  Estimated body mass index is 29.31 kg/m  as calculated from the following:    Height as of this encounter: 1.676 m (5' 6\").    Weight as of this encounter: 82.4 kg (181 lb 9.6 oz).   Weight management plan: Discussed healthy diet and exercise guidelines      See Patient Instructions    Subjective   Iam is a 64 year old, presenting for the following health issues:  Establish Care        2/12/2024    10:47 AM   Additional Questions   Roomed by Kalyn HAY CMA     History of Present Illness       Reason for visit:  New patient    He eats 0-1 servings of fruits and vegetables daily.He consumes 1 sweetened beverage(s) daily.He exercises with enough effort to increase his heart rate 9 or less minutes per day.  He exercises with enough effort to increase his heart rate 3 or less days per week.   He is taking medications regularly.     -Patient declined all vaccinations today.     -Patient states he just had a physical and blood work.  [facility] ~ now [facility] not [facility] ~ [facility]  ~ will need medications filled    Has been off of statin for almost a year       Review of Systems  Constitutional, HEENT, cardiovascular, pulmonary, gi and gu systems are negative, except as otherwise noted.      Objective    /70 (BP Location: Right arm, Patient Position: Sitting, Cuff Size: " "Adult Regular)   Pulse 84   Temp 97.6  F (36.4  C) (Tympanic)   Resp 22   Ht 1.676 m (5' 6\")   Wt 82.4 kg (181 lb 9.6 oz)   SpO2 96%   BMI 29.31 kg/m    Body mass index is 29.31 kg/m .  Physical Exam   GENERAL: alert and no distress  NECK: no adenopathy, no asymmetry, masses, or scars  RESP: lungs clear to auscultation - no rales, rhonchi or wheezes  CV: regular rate and rhythm, normal S1 S2, no S3 or S4, no murmur, click or rub, no peripheral edema  ABDOMEN: soft, nontender, no hepatosplenomegaly, no masses and bowel sounds normal  MS: no gross musculoskeletal defects noted, no edema  NEURO: Normal strength and tone, mentation intact and speech normal  PSYCH: mentation appears normal, affect normal/bright    Diagnostic Test Results:  none          Signed Electronically by: Carlene Grace, AMANDA, APRN-CNP       Chart documentation with Dragon Voice recognition Software. Although reviewed after completion, some words and grammatical errors may remain.   "

## 2024-02-12 NOTE — PATIENT INSTRUCTIONS
PAD (peripheral artery disease) (H24)  Claudication of left lower extremity (H24)  Chronic, follows vascular. Taking aspirin daily. Due for recheck with vascular medicine in June of this year ~ schedule. Continue all medications without any changes.   - aspirin 81 MG EC tablet; Take 1 tablet (81 mg) by mouth daily    Gastroesophageal reflux disease, unspecified whether esophagitis present  Chronic on Pepcid. Continue without any changes.   - famotidine (PEPCID) 20 MG tablet; Take 1 tablet (20 mg) by mouth 2 times daily    Would recommend restarting your Vitamin D daily.

## 2024-03-12 ENCOUNTER — TELEPHONE (OUTPATIENT)
Dept: VASCULAR SURGERY | Facility: CLINIC | Age: 64
End: 2024-03-12
Payer: MEDICAID

## 2024-06-10 NOTE — PATIENT INSTRUCTIONS
Syed Vitale,    Thank you for entrusting your care with us today. After your visit today with BERENICE Kimball this is the plan that was discussed at your appointment.    Follow up with BERENICE Bautista in 1 year with repeat US and CTA prior to appointment. We will reach out to you closer to that time to schedule.     I am including additional information on these things and our contact information if you have any questions or concerns.   Please do not hesitate to reach out to us if you felt we did not answer your questions or you are unsure of the treatment plan after your visit today. Our number is [phone].Thank you for trusting us with your care.         Again thank you for your time.

## 2024-06-11 ENCOUNTER — HOSPITAL ENCOUNTER (OUTPATIENT)
Dept: ULTRASOUND IMAGING | Facility: CLINIC | Age: 64
Discharge: HOME | End: 2024-06-11
Attending: SURGERY
Payer: COMMERCIAL

## 2024-06-11 ENCOUNTER — HOSPITAL ENCOUNTER (OUTPATIENT)
Dept: CT IMAGING | Facility: CLINIC | Age: 64
Discharge: HOME | End: 2024-06-11
Attending: SURGERY
Payer: COMMERCIAL

## 2024-06-11 DIAGNOSIS — I73.9 PAD (PERIPHERAL ARTERY DISEASE) (H): ICD-10-CM

## 2024-06-11 DIAGNOSIS — I73.9 CLAUDICATION OF LEFT LOWER EXTREMITY (H): ICD-10-CM

## 2024-06-11 PROCEDURE — 74174 CTA ABD&PLVS W/CONTRAST: CPT

## 2024-06-11 PROCEDURE — 250N000009 HC RX 250: Performed by: RADIOLOGY

## 2024-06-11 PROCEDURE — 250N000011 HC RX IP 250 OP 636: Performed by: RADIOLOGY

## 2024-06-11 PROCEDURE — 93922 UPR/L XTREMITY ART 2 LEVELS: CPT

## 2024-06-11 RX ORDER — IOPAMIDOL 755 MG/ML
72 INJECTION, SOLUTION INTRAVASCULAR ONCE
Status: COMPLETED | OUTPATIENT
Start: 2024-06-11 | End: 2024-06-11

## 2024-06-11 RX ADMIN — SODIUM CHLORIDE 100 ML: 9 INJECTION, SOLUTION INTRAVENOUS at 12:48

## 2024-06-11 RX ADMIN — IOPAMIDOL 72 ML: 755 INJECTION, SOLUTION INTRAVENOUS at 12:47

## 2024-06-13 ENCOUNTER — VIRTUAL VISIT (OUTPATIENT)
Dept: VASCULAR SURGERY | Facility: CLINIC | Age: 64
End: 2024-06-13
Attending: PHYSICIAN ASSISTANT
Payer: COMMERCIAL

## 2024-06-13 VITALS — BODY MASS INDEX: 27 KG/M2 | WEIGHT: 168 LBS | HEIGHT: 66 IN

## 2024-06-13 DIAGNOSIS — I73.9 PAD (PERIPHERAL ARTERY DISEASE) (H): Primary | ICD-10-CM

## 2024-06-13 PROCEDURE — 99214 OFFICE O/P EST MOD 30 MIN: CPT | Mod: 93 | Performed by: PHYSICIAN ASSISTANT

## 2024-06-13 ASSESSMENT — PAIN SCALES - GENERAL: PAINLEVEL: NO PAIN (0)

## 2024-06-13 NOTE — NURSING NOTE
No other vitals to report today, they were checked at imaging appts per pt     Is the patient currently in the state of [place]? YES    Visit mode:TELEPHONE    If the visit is dropped, the patient can be reconnected by: TELEPHONE VISIT: Phone number:   Telephone Information:   Mobile [phone]       Will anyone else be joining the visit? NO  (If patient encounters technical issues they should call [phone]    How would you like to obtain your AVS? Mail a copy    Are changes needed to the allergy or medication list? No    Are refills needed on medications prescribed by this physician? NO    Reason for visit: JAKY Hopkins MA VVF

## 2024-06-13 NOTE — Clinical Note
Needs 1 year follow up with BERENICE Bautista with repeat US w/o exercise and CTA Abd/Pelvis  - appears they like [place] for imaging. Thank you!

## 2024-06-13 NOTE — PROGRESS NOTES
VASCULAR SURGERY PROGRESS NOTE    LOCATION:  [facility] - telephone visit     Iam Villanueva  Medical Record #: [id number]  YOB: 1960  Age: 64 year old     Date of Service: 6/13/2024    PRIMARY CARE PROVIDER: [facility]    Reason for visit: Surveillance of PAD and renal artery aneurysm     IMPRESSION: 63 YO male presenting for follow up of peripheral arterial disease s/p left iliac artery stent placement for lifestyle limiting claudication in April of 2023 and a known renal artery aneurysm.  ABIs today are normal bilaterally at 1.25 on the right and 1.12 on the left.  Patient denies any significant claudication, rest pain, or nonhealing wounds.  Patient also has a known right renal artery aneurysm, stable at 1.2 cm on recent CT scan.  Medically optimized.    RECOMMENDATION/RISKS: Continue best medical management. Will follow up in 1 year with repeat studies.    HPI:  Iam Villanueva is a 64 year old male followed by the vascular surgery team for peripheral arterial disease status post left iliac artery stent placement in April 2023 for lifestyle-limiting claudication.  Patient also has a known right renal artery aneurysm.  Patient was last seen in the vascular clinic 1 year ago and was noted to be doing well with normal ABIs and resolution of his lower extremity symptoms.    Today, patient is contacted via phone call for follow-up.  Mr. Galeana is doing well and denies any leg pain with ambulation or at rest. Denies any lower extremity wounds.  He is compliant with his aspirin.    Imaging results were discussed and all questions answered.  No other concerns.    REVIEW OF SYSTEMS:    A 12 point ROS was reviewed and is negative except for what is listed above in HPI.    PHH:    Past Medical History:   Diagnosis Date    PVD (peripheral vascular disease) (H24)       Past Surgical History:   Procedure Laterality Date    ENDOSCOPIC RETROGRADE CHOLANGIOPANCREATOGRAM N/A  "12/23/2020    Procedure: ENDOSCOPIC RETROGRADE CHOLANGIOPANCREATOGRAPHY, biliary sphincterotomy and stent placement;  Surgeon: Nima Barnard MD;  Location: [facility] OR    ENDOSCOPIC RETROGRADE CHOLANGIOPANCREATOGRAM N/A 2/16/2021    Procedure: ENDOSCOPIC RETROGRADE CHOLANGIOPANCREATOGRAPHY WITH BILE DUCT STENT AND DEBRIS REMOVAL;  Surgeon: Nima Barnard MD;  Location: [facility] OR    IR LOWER EXTREMITY ANGIOGRAM LEFT  4/28/2023    LAPAROSCOPIC CHOLECYSTECTOMY N/A 12/17/2020    Procedure: CHOLECYSTECTOMY, LAPAROSCOPIC;  Surgeon: Qasim Farah MD;  Location: [facility] OR     ALLERGIES:  Plavix [clopidogrel]    MEDS:    Current Outpatient Medications:     aspirin 81 MG EC tablet, Take 1 tablet (81 mg) by mouth daily, Disp: 30 tablet, Rfl: 3    famotidine (PEPCID) 20 MG tablet, Take 1 tablet (20 mg) by mouth 2 times daily, Disp: 180 tablet, Rfl: 3    SOCIAL HABITS:    History   Smoking Status    Every Day    Packs/day: 1.00    Types: Cigarettes   Smokeless Tobacco    Never     Social History    Substance and Sexual Activity      Alcohol use: Not Currently      History   Drug Use Unknown     FAMILY HISTORY:  No family history on file.    PE:  Ht 1.676 m (5' 6\")   Wt 76.2 kg (168 lb)   BMI 27.12 kg/m    Wt Readings from Last 1 Encounters:   06/13/24 76.2 kg (168 lb)     Body mass index is 27.12 kg/m .    EXAM:  No physical exam done - telephone visit     DIAGNOSTIC STUDIES:     Images:    CTA Abdomen Pelvis with Contrast    Result Date: 6/11/2024  HISTORY: PAD (peripheral artery disease) (H24); Claudication of left lower extremity (H24).    IMPRESSION:   1. Patent abdominal aorta without evidence of aneurysm, dissection or stenosis.   2. Aneurysmal dilatation of the distal right renal artery measuring 1.2 cm. Previously this was described as being the distal left renal artery, however no aneurysm is noted in the distal left renal artery.   3. Celiac axis, superior mesenteric artery and inferior mesenteric arteries are " patent.   4. Aneurysmal dilatation of the mid left internal iliac artery measuring 0.9 cm.     US MITCHELL with PPG wo Exercise Bilateral    Result Date: 6/11/2024  HISTORY: PAD (peripheral artery disease) (H24). Claudication of left lower extremity (H24)    IMPRESSION:  Normal ABIs bilaterally without evidence of arterial insufficiency.    LABS:      Sodium   Date Value Ref Range Status   05/18/2023 133 (L) 136 - 145 mmol/L Final   04/14/2023 137 136 - 145 mmol/L Final   03/13/2023 132 (L) 136 - 145 mmol/L Final   02/16/2021 140 133 - 144 mmol/L Final   12/24/2020 136 133 - 144 mmol/L Final   12/22/2020 133 133 - 144 mmol/L Final     Urea Nitrogen   Date Value Ref Range Status   05/18/2023 10.3 8.0 - 23.0 mg/dL Final   04/14/2023 9.0 8.0 - 23.0 mg/dL Final   03/13/2023 9.1 8.0 - 23.0 mg/dL Final   02/16/2021 13 7 - 30 mg/dL Final   12/24/2020 17 7 - 30 mg/dL Final   12/22/2020 17 7 - 30 mg/dL Final     Hemoglobin   Date Value Ref Range Status   05/18/2023 17.3 13.3 - 17.7 g/dL Final   03/13/2023 16.1 13.3 - 17.7 g/dL Final   02/16/2021 15.5 13.3 - 17.7 g/dL Final   12/24/2020 14.5 13.3 - 17.7 g/dL Final   12/22/2020 16.0 13.3 - 17.7 g/dL Final     Platelet Count   Date Value Ref Range Status   05/18/2023 207 150 - 450 10e3/uL Final   03/13/2023 188 150 - 450 10e3/uL Final   02/16/2021 194 150 - 450 10e9/L Final   12/24/2020 282 150 - 450 10e9/L Final   12/22/2020 247 150 - 450 10e9/L Final     INR   Date Value Ref Range Status   02/16/2021 1.02 0.86 - 1.14 Final   12/24/2020 1.07 0.86 - 1.14 Final   12/22/2020 1.11 0.86 - 1.14 Final     30 minutes spent on the day of encounter doing chart review, history and exam, documentation, and further activities as noted.     5 minutes spent on the phone with patient.     DACIA MontejoC  VASCULAR SURGERY

## 2024-11-15 ENCOUNTER — TELEPHONE (OUTPATIENT)
Dept: FAMILY MEDICINE | Facility: CLINIC | Age: 64
End: 2024-11-15
Payer: MEDICAID

## 2024-11-15 NOTE — TELEPHONE ENCOUNTER
Order/Referral Request    Who is requesting: Marcelina at [facility]    Orders being requested: continence of care     Reason service is needed/diagnosis: patients referral expires in February he wants to make sure his care is continued for    When are orders needed by: ASAP    Has this been discussed with Provider: Yes    Does patient have a preference on a Group/Provider/Facility? [facility]    Does patient have an appointment scheduled?: No    Where to send orders: Fax    Okay to leave a detailed message?: N/A at Other phone number:  [phone]*

## 2024-11-30 ENCOUNTER — MEDICAL CORRESPONDENCE (OUTPATIENT)
Dept: HEALTH INFORMATION MANAGEMENT | Facility: CLINIC | Age: 64
End: 2024-11-30

## 2025-05-27 ENCOUNTER — HOSPITAL ENCOUNTER (OUTPATIENT)
Dept: CT IMAGING | Facility: CLINIC | Age: 65
Discharge: HOME | End: 2025-05-27
Attending: PHYSICIAN ASSISTANT
Payer: COMMERCIAL

## 2025-05-27 ENCOUNTER — HOSPITAL ENCOUNTER (OUTPATIENT)
Dept: ULTRASOUND IMAGING | Facility: CLINIC | Age: 65
Discharge: HOME | End: 2025-05-27
Attending: PHYSICIAN ASSISTANT
Payer: COMMERCIAL

## 2025-05-27 DIAGNOSIS — I73.9 PAD (PERIPHERAL ARTERY DISEASE): ICD-10-CM

## 2025-05-27 PROCEDURE — 250N000011 HC RX IP 250 OP 636: Performed by: PHYSICIAN ASSISTANT

## 2025-05-27 PROCEDURE — 74174 CTA ABD&PLVS W/CONTRAST: CPT

## 2025-05-27 PROCEDURE — 250N000009 HC RX 250: Performed by: PHYSICIAN ASSISTANT

## 2025-05-27 PROCEDURE — 93922 UPR/L XTREMITY ART 2 LEVELS: CPT

## 2025-05-27 RX ORDER — IOPAMIDOL 755 MG/ML
72 INJECTION, SOLUTION INTRAVASCULAR ONCE
Status: COMPLETED | OUTPATIENT
Start: 2025-05-27 | End: 2025-05-27

## 2025-05-27 RX ADMIN — IOPAMIDOL 72 ML: 755 INJECTION, SOLUTION INTRAVENOUS at 12:37

## 2025-05-27 RX ADMIN — SODIUM CHLORIDE 100 ML: 9 INJECTION, SOLUTION INTRAVENOUS at 12:37

## 2025-06-03 NOTE — PATIENT INSTRUCTIONS
Syed Vitale,    Thank you for entrusting your care with us today. After your visit today with Avani Mario NP this is the plan that was discussed at your appointment.    Please follow-up in one year with ultrasound and CT prior.    We will call you closer to that date to schedule.      I am including additional information on these things and our contact information if you have any questions or concerns.   Please do not hesitate to reach out to us if you felt we did not answer your questions or you are unsure of the treatment plan after your visit today. Our number is [phone].Thank you for trusting us with your care.         Again thank you for your time.     Ankle-Brachial Index (MITCHELL) or Physiologic Test    Description  An ankle-brachial index test is relatively pain free. Blood pressure cuffs of various sizes are placed on your thigh, calf, foot and toes.  Similar to having your blood pressure checked with an arm cuff, as the technician inflates the cuffs, they progressively tighten and are then quickly released.  You may feel some discomfort, but generally for less than 60 seconds for each measurement. You will be asked to remove your socks and shoes and possibly your pants or shorts. Gowns will be provided. It usually takes about 30-60 minutes.   Depending on the initial readings and patient symptoms, you may be asked to perform a light walk on a treadmill.  The technician will apply ultrasound gel, usually warmed for your comfort, to your ankles and wrists. Through the gel, the technician will use a small hand-held device that emits sound waves.  Risks  There are typically no side effects or complications associated with a physiologic study.  How to Prepare  Eat and take medications as usual.  There is no preparation required for an ankle-brachial index (MITCHELL) or physiologic exam.  What Can I Expect After the Test?  The technician will send the ultrasound images to your vascular surgeon for evaluation.  Typically, a report is available in 2-3 days. If anything critical is found, it is standard practice to notify the vascular surgeon immediately.  Reference: https://vascular.org/patient-resources/vascular-tests  Computed Tomography (CT) Scan: About This Test    What is it?  A computed tomography (CT) scan uses X-rays to make detailed pictures of parts of your body and the structures inside your body. During the test, you will lie on a table that is attached to the CT scanner. The CT scanner is a large doughnut-shaped machine.    Why is this test done?  Doctors use CT scans to study areas of the body, such as the brain, chest, belly, spine, bones, or joints. CT scans are also used to assist with or check on the success of a procedure or surgery.    How do you prepare for the test?  In general, there's nothing you have to do before this test, unless your doctor tells you to.    Tell your doctor if you get nervous in tight spaces. You may get a medicine to help you relax. If you think you'll get this medicine, be sure you have someone to take you home.    How is the test done?  Before the test  You may have to take off jewelry.  You will take off all or most of your clothes and change into a gown. If you do leave some clothes on, make sure you take everything out of your pockets.  You may have contrast material (dye) put into your arm through a tube called an IV.    During the test  You will lie on a table that is attached to the CT scanner.  The table slides into the round opening of the scanner. The table will move during the scan. The scanner moves within the doughnut-shaped casing around your body.  You will be asked to hold still during the scan. You may be asked to hold your breath for short periods.  You may be alone in the scanning room. But a technologist will watch you through a window and talk with you during the test.    How does the test feel?  The test will not cause pain, but some people feel nervous  inside the CT scanner.    If a medicine to help you relax (sedative) or dye is used, you may feel a quick sting or pinch when the IV is started. The dye may make you feel warm and flushed and give you a metallic taste in your mouth. Some people feel sick to their stomach or get a headache. Tell the technologist or your doctor how you are feeling.    How long does the test take?  The test will take about 30 to 60 minutes. Most of this time is spent getting ready for the scan. The actual test takes a few minutes.    What happens after the test?  You will probably be able to go home right away.  You can go back to your usual activities right away.  If dye was used, drink plenty of fluids for 24 hours after the test, unless your doctor tells you not to.  Follow-up care is a key part of your treatment and safety. Be sure to make and go to all appointments, and call your doctor if you are having problems. It's also a good idea to keep a list of the medicines you take. Ask your doctor when you can expect to have your test results.    Current as of: December 19, 2022  Author: [employer]wise Staff  Medical Review:Elvin Reyna MD - Family Medicine & DWAYNE Parra MD - Internal Medicine & Ga Wilson MD - Family Medicine & Jaime Lopez MD - Family Medicine & Lonnie Cerna MD - Diagnostic Radiology

## 2025-06-09 ENCOUNTER — VIRTUAL VISIT (OUTPATIENT)
Dept: VASCULAR SURGERY | Facility: CLINIC | Age: 65
End: 2025-06-09
Attending: NURSE PRACTITIONER
Payer: COMMERCIAL

## 2025-06-09 VITALS — BODY MASS INDEX: 25.39 KG/M2 | HEIGHT: 66 IN | WEIGHT: 158 LBS

## 2025-06-09 DIAGNOSIS — I72.2 RENAL ARTERY ANEURYSM: ICD-10-CM

## 2025-06-09 DIAGNOSIS — Z95.828 S/P INSERTION OF ILIAC ARTERY STENT: ICD-10-CM

## 2025-06-09 DIAGNOSIS — I73.9 PAD (PERIPHERAL ARTERY DISEASE): Primary | ICD-10-CM

## 2025-06-09 DIAGNOSIS — K21.9 GASTROESOPHAGEAL REFLUX DISEASE, UNSPECIFIED WHETHER ESOPHAGITIS PRESENT: ICD-10-CM

## 2025-06-09 DIAGNOSIS — F17.200 CURRENT SMOKER: ICD-10-CM

## 2025-06-09 PROCEDURE — 99406 BEHAV CHNG SMOKING 3-10 MIN: CPT | Performed by: NURSE PRACTITIONER

## 2025-06-09 PROCEDURE — 98013 SYNCH AUDIO-ONLY EST LOW 20: CPT | Performed by: NURSE PRACTITIONER

## 2025-06-09 PROCEDURE — 1126F AMNT PAIN NOTED NONE PRSNT: CPT | Performed by: NURSE PRACTITIONER

## 2025-06-09 RX ORDER — PRAVASTATIN SODIUM 20 MG
20 TABLET ORAL DAILY
Qty: 90 TABLET | Refills: 3 | Status: SHIPPED | OUTPATIENT
Start: 2025-06-09 | End: 2025-09-07

## 2025-06-09 RX ORDER — FAMOTIDINE 20 MG/1
20 TABLET, FILM COATED ORAL 2 TIMES DAILY
Qty: 180 TABLET | Refills: 3 | Status: CANCELLED | OUTPATIENT
Start: 2025-06-09

## 2025-06-09 RX ORDER — PRAVASTATIN SODIUM 20 MG
20 TABLET ORAL DAILY
Qty: 90 TABLET | Refills: 3 | Status: CANCELLED | OUTPATIENT
Start: 2025-06-09 | End: 2026-06-04

## 2025-06-09 ASSESSMENT — PAIN SCALES - GENERAL: PAINLEVEL_OUTOF10: NO PAIN (0)

## 2025-06-09 NOTE — NURSING NOTE
Current patient location: work    Is the patient currently in the state [facility]? YES    Visit mode: TELEPHONE    If the visit is dropped, the patient can be reconnected by:TELEPHONE VISIT: Phone number:   Telephone Information:   Mobile [phone]       Will anyone else be joining the visit? NO  (If patient encounters technical issues they should call [phone]    Are changes needed to the allergy or medication list? No    Are refills needed on medications prescribed by this physician? YES    Rooming Documentation:  Questionnaire(s) completed    No other vitals to report today    Reason for visit: JAKY Hopkins MA VVF

## 2025-06-09 NOTE — PROGRESS NOTES
VASCULAR SURGERY PROGRESS NOTE    LOCATION:  [facility], telephone visit    Iam Villanueva  Medical Record #:  [id number]  YOB: 1960  Age:  65 year old     Date of Service: 6/9/2025    PRIMARY CARE PROVIDER: [facility]    Reason for visit: s/p left iliac stenting April 2023, known renal artery aneurysm    Impression/Shared Medical Decision Making:     PAD  S/p left iliac stenting April 2023  Renal artery aneurysm, right.  Approximately 1.2 cm  Current smoker  GERD    Iam Villanueva is a pleasant 65-year-old gentleman who presents to vascular surgery clinic for annual surveillance of his known PAD and renal artery aneurysm.  ABIs are normal bilaterally, renal artery aneurysm is stable, partially calcified, at 1.2 cm, same as last year.  Without rest pain, back pain, abdominal pain, or any other concerning symptoms.    Recommendations/Plan:   We will start low-dose pravastatin 20 mg. Patient was previously on atorvastatin 80 mg daily with side effect loose stools/diarrhea and subsequently stopped it.  We will attempt low-dose pravastatin and if he is able to tolerate this well, we will continue it.  LDL 2 years ago was 113.  Smoking cessation, patient is an everyday smoker, approximately 1 pack/day.  Not ready to quit yet  Continue with aspirin 81 mg daily  Follow-up in 1 year with repeat ABIs and CTA abdomen pelvis with contrast.    Discussed warning signs including, but not limited to, new leg pain, motor or sensory deficits, chronic limb threatening ischemia, ischemic rest pain, and nonhealing wounds.  If he experiences any of these, he has been advised to seek treatment and contact our team, patient verbalized clear understanding of the above.  We also discussed that should he develop new abdominal pain, back pain, he should present to the ED immediately for further evaluation given his renal artery aneurysm. Information/Education: patient able to teach  "back. Patient agreeable to plan of care: yes.    All questions were answered and support provided. He has our contact information and knows to reach out with any additional questions or concerns.     It was a pleasure seeing Mr Villanueva in clinic today.    Avani Mario, [facility]  Vascular Surgery  Pager: [phone]  [email or web address]  Send message or 10 digit call back number Securely via [employer] with the [employer] Web Console (learn more here)    15 minutes spent on the date of the encounter doing chart review, review of outside records, review of test results, interpretation of tests, patient visit, documentation and discussion with other provider(s).    The longitudinal plan of care for the diagnosis(es)/condition(s) as documented were addressed during this visit. Due to the added complexity in care, I will continue to support Iam in the subsequent management and with ongoing continuity of care.    We spent 3 minutes today discussing the patient s current 1-pack per day cigarette dependence; the effects of smoking on atherosclerosis, risk of limb loss; and a counseling plan for quitting. After further discussion, the patient stated they are not ready at this time to quit however. Motivational treatments were discussed, and support provided for future quit attempts including offer for referral to quit smoking program.      HPI:  Iam Villanueva is a 65 year old male who presents to vascular surgery for longitudinal follow-up.  He is s/p left iliac stenting in April 2023, reports significantly improved ability to walk, feels that his legs are \"even better than before surgery\".  Unfortunately he continues to smoke, not ready to quit yet. Denies abdominal pain, back pain, or any other concerns.    REVIEW OF SYSTEMS:    A 12 point ROS was reviewed and is negative except for what is listed above in HPI.    PHH:    Past Medical History:   Diagnosis Date    PVD (peripheral vascular disease)           Past Surgical " History:   Procedure Laterality Date    ENDOSCOPIC RETROGRADE CHOLANGIOPANCREATOGRAM N/A 12/23/2020    Procedure: ENDOSCOPIC RETROGRADE CHOLANGIOPANCREATOGRAPHY, biliary sphincterotomy and stent placement;  Surgeon: Nima Barnard MD;  Location: [facility] OR    ENDOSCOPIC RETROGRADE CHOLANGIOPANCREATOGRAM N/A 2/16/2021    Procedure: ENDOSCOPIC RETROGRADE CHOLANGIOPANCREATOGRAPHY WITH BILE DUCT STENT AND DEBRIS REMOVAL;  Surgeon: Nima Barnard MD;  Location: [facility] OR    IR LOWER EXTREMITY ANGIOGRAM LEFT  4/28/2023    LAPAROSCOPIC CHOLECYSTECTOMY N/A 12/17/2020    Procedure: CHOLECYSTECTOMY, LAPAROSCOPIC;  Surgeon: Qasim Farah MD;  Location: [facility] OR       ALLERGIES:  Plavix [clopidogrel]    MEDS:    Current Outpatient Medications:     aspirin 81 MG EC tablet, Take 1 tablet (81 mg) by mouth daily, Disp: 30 tablet, Rfl: 3    famotidine (PEPCID) 20 MG tablet, Take 1 tablet (20 mg) by mouth 2 times daily, Disp: 180 tablet, Rfl: 3    SOCIAL HABITS:    History   Smoking Status    Every Day    Packs/day: 1.00    Types: Cigarettes   Smokeless Tobacco    Never     Social History    Substance and Sexual Activity      Alcohol use: Not Currently      History   Drug Use Unknown       FAMILY HISTORY:  No family history on file.    PE:  There were no vitals taken for this visit.  Wt Readings from Last 1 Encounters:   06/13/24 168 lb     There is no height or weight on file to calculate BMI.    EXAM:  GENERAL: alert and no distress  RESP: No audible wheeze, cough.    NEURO: Cranial nerves grossly intact.  Mentation and speech appropriate for age.  PSYCH: Appropriate affect, tone, and pace of words      DIAGNOSTIC STUDIES:     Images:  US MITCHELL with PPG wo Exercise Bilateral  Result Date: 5/27/2025  EXAM: RESTING ANKLE-BRACHIAL INDICES (ABIs) LOCATION: [facility] DATE: 5/27/2025 INDICATION:  PAD (peripheral artery disease) COMPARISON: None. MITCHELL FINDINGS: RIGHT Brachial: 136 Ankle (PT): 166 Index: 1.12 Ankle  (DP): 165 Index: 1.11 1st Digit: 116 LEFT Brachial: 148 Ankle (PT): 168 Index: 1.14 Ankle (DP): 169 Index: 1.14 1st Digit: 128 The right MITCHELL at rest is 1.12. The left MITCHELL at rest is 1.14.  WAVEFORMS: The dorsalis pedis and posterior tibial arteries exhibit multiphasic waveforms.     IMPRESSION: 1.  RIGHT LOWER EXTREMITY: MITCHELL at rest is normal. 2.  LEFT LOWER EXTREMITY: MITCHELL at rest is normal.      CTA Abdomen Pelvis with Contrast  Result Date: 5/27/2025  EXAM: CTA ABDOMEN PELVIS WITH CONTRAST LOCATION: [facility] DATE: 5/27/2025 INDICATION:  PAD (peripheral artery disease) COMPARISON: CT angiogram of the abdomen and pelvis dated 6/11/2024 TECHNIQUE: CT angiogram abdomen pelvis during arterial phase of injection of IV contrast. 2D and 3D MIP reconstructions were performed by the CT technologist. Dose reduction techniques were used. CONTRAST: 72mL isovue 370 FINDINGS: ANGIOGRAM ABDOMEN/PELVIS: Abdominal aorta: There is scattered calcified and soft plaque primarily in the infrarenal abdominal aorta. No evidence for abdominal aortic aneurysm or significant stenosis. The celiac trunk, superior mesenteric artery, and inferior mesenteric artery are patent without significant stenoses. The renal arteries are patent without significant stenoses. Again identified is a partially calcified aneurysm arising from the distal right renal artery. This has a maximum diameter of approximately 1.2 cm. This is not significantly changed. Iliac arteries: The iliac arteries including a stent in the left common iliac artery are patent without significant stenoses. Again identified is focal ectasia of the mid left internal iliac artery measuring approximately 9 mm. This is unchanged. LOWER CHEST: Unremarkable HEPATOBILIARY: The patient is status post cholecystectomy. PANCREAS: Unremarkable SPLEEN: Unremarkable ADRENAL GLANDS: Unremarkable KIDNEYS/BLADDER: Cysts in the left kidney. BOWEL: A few scattered colonic  diverticuli. LYMPH NODES: Unremarkable PELVIC ORGANS: Enlarged prostate with calcifications, the sequela of chronic prostatitis. MUSCULOSKELETAL: Unremarkable     IMPRESSION: 1.  Stable 1.2 cm distal right renal artery aneurysm. 2.  Stable focal ectasia of the mid left internal iliac artery measuring 9 mm.       LABS:      Sodium   Date Value Ref Range Status   05/18/2023 133 (L) 136 - 145 mmol/L Final   04/14/2023 137 136 - 145 mmol/L Final   03/13/2023 132 (L) 136 - 145 mmol/L Final   02/16/2021 140 133 - 144 mmol/L Final   12/24/2020 136 133 - 144 mmol/L Final   12/22/2020 133 133 - 144 mmol/L Final     Urea Nitrogen   Date Value Ref Range Status   05/18/2023 10.3 8.0 - 23.0 mg/dL Final   04/14/2023 9.0 8.0 - 23.0 mg/dL Final   03/13/2023 9.1 8.0 - 23.0 mg/dL Final   02/16/2021 13 7 - 30 mg/dL Final   12/24/2020 17 7 - 30 mg/dL Final   12/22/2020 17 7 - 30 mg/dL Final     Hemoglobin   Date Value Ref Range Status   05/18/2023 17.3 13.3 - 17.7 g/dL Final   03/13/2023 16.1 13.3 - 17.7 g/dL Final   02/16/2021 15.5 13.3 - 17.7 g/dL Final   12/24/2020 14.5 13.3 - 17.7 g/dL Final   12/22/2020 16.0 13.3 - 17.7 g/dL Final     Platelet Count   Date Value Ref Range Status   05/18/2023 207 150 - 450 10e3/uL Final   03/13/2023 188 150 - 450 10e3/uL Final   02/16/2021 194 150 - 450 10e9/L Final   12/24/2020 282 150 - 450 10e9/L Final   12/22/2020 247 150 - 450 10e9/L Final     INR   Date Value Ref Range Status   02/16/2021 1.02 0.86 - 1.14 Final   12/24/2020 1.07 0.86 - 1.14 Final   12/22/2020 1.11 0.86 - 1.14 Final

## 2025-06-09 NOTE — PROGRESS NOTES
Virtual Visit Details    Type of service:  Telephone Visit   Originating Location (pt. Location): Home    Distant Location (provider location):  On-site  Telephone visit completed due to the patient did not have access to video, while the distant provider did.

## 2025-06-10 ENCOUNTER — RESULTS FOLLOW-UP (OUTPATIENT)
Dept: SURGERY | Facility: CLINIC | Age: 65
End: 2025-06-10

## (undated) DEVICE — SUCTION MANIFOLD NEPTUNE 2 SYS 4 PORT 0702-020-000

## (undated) DEVICE — ENDO BITE BLOCK ADULT OMNI-BLOC

## (undated) DEVICE — ESU GROUND PAD ADULT W/CORD E7507

## (undated) DEVICE — ENDO TROCAR BLUNT TIP KII BALLOON 12X100MM C0R47

## (undated) DEVICE — SU VICRYL 4-0 FS-2 27" J422-H

## (undated) DEVICE — SOL WATER IRRIG 1000ML BOTTLE 07139-09

## (undated) DEVICE — ENDO FUSION OMNI-TOME G31903

## (undated) DEVICE — ENDO TUBING CO2 SMARTCAP STERILE DISP 100145CO2EXT

## (undated) DEVICE — ENDO TROCAR FIRST ENTRY KII FIOS ADV FIX 11X100MM CFF33

## (undated) DEVICE — SOL WATER IRRIG 1000ML BOTTLE 2F7114

## (undated) DEVICE — SUCTION MANIFOLD DORNOCH ULTRA CART UL-CL500

## (undated) DEVICE — ADH SKIN CLOSURE PREMIERPRO EXOFIN 1.0ML 3470

## (undated) DEVICE — WIRE GUIDE 0.025"X270CM ANG VISIGLIDE G-240-2527A

## (undated) DEVICE — KIT ENDO FIRST STEP DISINFECTANT 200ML W/POUCH EP-4

## (undated) DEVICE — DRAPE POUCH INSTRUMENT 3 POCKET 1018L

## (undated) DEVICE — PACK ENDOSCOPY GI CUSTOM UMMC

## (undated) DEVICE — DRAIN JACKSON PRATT 10MM FLAT 4/4 PERF SU130-1311

## (undated) DEVICE — ENDO FLOSEAL APPLICATOR 1500181

## (undated) DEVICE — SU ETHILON 2-0 FS 18" 664G

## (undated) DEVICE — DECANTER VIAL 2006S

## (undated) DEVICE — SU VICRYL 0 UR-6 27" J603H

## (undated) DEVICE — ENDO TROCAR FIRST ENTRY KII FIOS ADV FIX 05X100MM CFF03

## (undated) DEVICE — GLOVE PROTEXIS W/NEU-THERA 7.5  2D73TE75

## (undated) DEVICE — SURGICEL ABSORBABLE HEMOSTAT SNOW 2"X4" 2082

## (undated) DEVICE — SOL NACL 0.9% IRRIG 1000ML BOTTLE 07138-09

## (undated) DEVICE — BALLOON EXTRACTION 15X1950MM 3.2MM TL B-V243Q-A

## (undated) DEVICE — ENDO POUCH UNIV RETRIEVAL SYSTEM INZII 10MM CD001

## (undated) DEVICE — WIRE GUIDE 0.025"X270CM STR VISIGLIDE G-240-2527S

## (undated) DEVICE — KIT CONNECTOR FOR OLYMPUS ENDOSCOPES DEFENDO 100310

## (undated) DEVICE — DRAIN JACKSON PRATT RESERVOIR 100ML SU130-1305

## (undated) DEVICE — CATH RETRIEVAL BALLOON EXTRACTOR PRO RX-S INJ ABOVE 9-12MM

## (undated) DEVICE — SUCTION IRRIGATION STRYKFLOW II W/TIP DISP 250-070-520

## (undated) DEVICE — STOCKING SLEEVE COMPRESSION CALF LG

## (undated) DEVICE — ESU ENDO SCISSORS 5MM CVD 5DCS

## (undated) DEVICE — INTR ENDOSCOPIC STENT FUSION OASIS 09.0FRX200CM

## (undated) DEVICE — ENDO DEVICE LOCKING AND BIOPSY CAP M00545261

## (undated) DEVICE — GOWN XLG DISP 9545

## (undated) DEVICE — NDL BLUNT 18GA 1" W/O FILTER 305181

## (undated) DEVICE — ESU HOLSTER PLASTIC DISP E2400

## (undated) DEVICE — ADH FLOSEAL W/HUMAN THROMBIN 5ML W/APPLICATOR TIP ADS201844

## (undated) DEVICE — SOL NACL 0.9% IRRIG 3000ML BAG 07972-08

## (undated) DEVICE — ESU CORD MONOPOLAR 10'  E0510

## (undated) DEVICE — BIOPSY VALVE BIOSHIELD 00711135

## (undated) DEVICE — Device

## (undated) DEVICE — ENDO SNARE POLYPECTOMY OVAL 15MM LOOP SD-240U-15

## (undated) DEVICE — PREP CHLORAPREP 26ML TINTED ORANGE  260815

## (undated) DEVICE — CLIP APPLIER ENDO 5MM M/L LIGAMAX EL5ML

## (undated) DEVICE — TUBING SUCTION 10'X3/16" N510

## (undated) DEVICE — ENDO TROCAR SLEEVE KII ADV FIXATION 05X100MM CFS02

## (undated) RX ORDER — PROTAMINE SULFATE 10 MG/ML
INJECTION, SOLUTION INTRAVENOUS
Status: DISPENSED
Start: 2023-04-28

## (undated) RX ORDER — SIMETHICONE 40MG/0.6ML
SUSPENSION, DROPS(FINAL DOSAGE FORM)(ML) ORAL
Status: DISPENSED
Start: 2021-02-16

## (undated) RX ORDER — HEPARIN SODIUM 1000 [USP'U]/ML
INJECTION, SOLUTION INTRAVENOUS; SUBCUTANEOUS
Status: DISPENSED
Start: 2023-04-28

## (undated) RX ORDER — KETOROLAC TROMETHAMINE 30 MG/ML
INJECTION, SOLUTION INTRAMUSCULAR; INTRAVENOUS
Status: DISPENSED
Start: 2020-12-17

## (undated) RX ORDER — ONDANSETRON 2 MG/ML
INJECTION INTRAMUSCULAR; INTRAVENOUS
Status: DISPENSED
Start: 2021-02-16

## (undated) RX ORDER — ONDANSETRON 2 MG/ML
INJECTION INTRAMUSCULAR; INTRAVENOUS
Status: DISPENSED
Start: 2020-12-17

## (undated) RX ORDER — FENTANYL CITRATE 50 UG/ML
INJECTION, SOLUTION INTRAMUSCULAR; INTRAVENOUS
Status: DISPENSED
Start: 2020-12-23

## (undated) RX ORDER — LIDOCAINE HYDROCHLORIDE 10 MG/ML
INJECTION, SOLUTION INFILTRATION; PERINEURAL
Status: DISPENSED
Start: 2023-04-28

## (undated) RX ORDER — INDOMETHACIN 50 MG/1
SUPPOSITORY RECTAL
Status: DISPENSED
Start: 2021-02-16

## (undated) RX ORDER — LIDOCAINE HYDROCHLORIDE 10 MG/ML
INJECTION, SOLUTION EPIDURAL; INFILTRATION; INTRACAUDAL; PERINEURAL
Status: DISPENSED
Start: 2020-12-17

## (undated) RX ORDER — DEXAMETHASONE SODIUM PHOSPHATE 4 MG/ML
INJECTION, SOLUTION INTRA-ARTICULAR; INTRALESIONAL; INTRAMUSCULAR; INTRAVENOUS; SOFT TISSUE
Status: DISPENSED
Start: 2020-12-17

## (undated) RX ORDER — FENTANYL CITRATE 50 UG/ML
INJECTION, SOLUTION INTRAMUSCULAR; INTRAVENOUS
Status: DISPENSED
Start: 2023-04-28

## (undated) RX ORDER — CLOPIDOGREL BISULFATE 75 MG/1
TABLET ORAL
Status: DISPENSED
Start: 2023-04-28

## (undated) RX ORDER — FENTANYL CITRATE 50 UG/ML
INJECTION, SOLUTION INTRAMUSCULAR; INTRAVENOUS
Status: DISPENSED
Start: 2021-02-16

## (undated) RX ORDER — FENTANYL CITRATE 50 UG/ML
INJECTION, SOLUTION INTRAMUSCULAR; INTRAVENOUS
Status: DISPENSED
Start: 2020-12-17

## (undated) RX ORDER — PROPOFOL 10 MG/ML
INJECTION, EMULSION INTRAVENOUS
Status: DISPENSED
Start: 2020-12-17

## (undated) RX ORDER — BUPIVACAINE HYDROCHLORIDE 5 MG/ML
INJECTION, SOLUTION PERINEURAL
Status: DISPENSED
Start: 2020-12-17

## (undated) RX ORDER — IOPAMIDOL 510 MG/ML
INJECTION, SOLUTION INTRAVASCULAR
Status: DISPENSED
Start: 2021-02-16